# Patient Record
Sex: MALE | Race: BLACK OR AFRICAN AMERICAN | NOT HISPANIC OR LATINO | URBAN - METROPOLITAN AREA
[De-identification: names, ages, dates, MRNs, and addresses within clinical notes are randomized per-mention and may not be internally consistent; named-entity substitution may affect disease eponyms.]

---

## 2021-07-09 ENCOUNTER — EMERGENCY (EMERGENCY)
Facility: HOSPITAL | Age: 71
LOS: 1 days | Discharge: HOME | End: 2021-07-09
Attending: STUDENT IN AN ORGANIZED HEALTH CARE EDUCATION/TRAINING PROGRAM
Payer: COMMERCIAL

## 2021-07-09 VITALS
RESPIRATION RATE: 20 BRPM | SYSTOLIC BLOOD PRESSURE: 169 MMHG | HEART RATE: 77 BPM | TEMPERATURE: 98 F | OXYGEN SATURATION: 100 % | DIASTOLIC BLOOD PRESSURE: 92 MMHG

## 2021-07-09 VITALS
HEIGHT: 70 IN | TEMPERATURE: 98 F | RESPIRATION RATE: 18 BRPM | DIASTOLIC BLOOD PRESSURE: 87 MMHG | SYSTOLIC BLOOD PRESSURE: 139 MMHG | OXYGEN SATURATION: 98 % | WEIGHT: 229.28 LBS | HEART RATE: 84 BPM

## 2021-07-09 LAB
ALBUMIN SERPL ELPH-MCNC: 3.5 G/DL — SIGNIFICANT CHANGE UP (ref 3.5–5)
ALP SERPL-CCNC: 115 U/L — SIGNIFICANT CHANGE UP (ref 40–120)
ALT FLD-CCNC: 43 U/L DA — SIGNIFICANT CHANGE UP (ref 10–60)
ANION GAP SERPL CALC-SCNC: 4 MMOL/L — LOW (ref 5–17)
APPEARANCE UR: ABNORMAL
AST SERPL-CCNC: 22 U/L — SIGNIFICANT CHANGE UP (ref 10–40)
BASOPHILS # BLD AUTO: 0.04 K/UL — SIGNIFICANT CHANGE UP (ref 0–0.2)
BASOPHILS NFR BLD AUTO: 0.5 % — SIGNIFICANT CHANGE UP (ref 0–2)
BILIRUB SERPL-MCNC: 0.3 MG/DL — SIGNIFICANT CHANGE UP (ref 0.2–1.2)
BILIRUB UR-MCNC: NEGATIVE — SIGNIFICANT CHANGE UP
BUN SERPL-MCNC: 32 MG/DL — HIGH (ref 7–18)
CALCIUM SERPL-MCNC: 8 MG/DL — LOW (ref 8.4–10.5)
CHLORIDE SERPL-SCNC: 113 MMOL/L — HIGH (ref 96–108)
CO2 SERPL-SCNC: 25 MMOL/L — SIGNIFICANT CHANGE UP (ref 22–31)
COLOR SPEC: YELLOW — SIGNIFICANT CHANGE UP
CREAT SERPL-MCNC: 2.57 MG/DL — HIGH (ref 0.5–1.3)
DIFF PNL FLD: ABNORMAL
EOSINOPHIL # BLD AUTO: 0.22 K/UL — SIGNIFICANT CHANGE UP (ref 0–0.5)
EOSINOPHIL NFR BLD AUTO: 2.9 % — SIGNIFICANT CHANGE UP (ref 0–6)
GLUCOSE SERPL-MCNC: 107 MG/DL — HIGH (ref 70–99)
GLUCOSE UR QL: NEGATIVE — SIGNIFICANT CHANGE UP
HCT VFR BLD CALC: 39.8 % — SIGNIFICANT CHANGE UP (ref 39–50)
HGB BLD-MCNC: 13.1 G/DL — SIGNIFICANT CHANGE UP (ref 13–17)
IMM GRANULOCYTES NFR BLD AUTO: 0.3 % — SIGNIFICANT CHANGE UP (ref 0–1.5)
KETONES UR-MCNC: ABNORMAL
LEUKOCYTE ESTERASE UR-ACNC: ABNORMAL
LYMPHOCYTES # BLD AUTO: 1.04 K/UL — SIGNIFICANT CHANGE UP (ref 1–3.3)
LYMPHOCYTES # BLD AUTO: 13.7 % — SIGNIFICANT CHANGE UP (ref 13–44)
MCHC RBC-ENTMCNC: 30.5 PG — SIGNIFICANT CHANGE UP (ref 27–34)
MCHC RBC-ENTMCNC: 32.9 GM/DL — SIGNIFICANT CHANGE UP (ref 32–36)
MCV RBC AUTO: 92.8 FL — SIGNIFICANT CHANGE UP (ref 80–100)
MONOCYTES # BLD AUTO: 0.74 K/UL — SIGNIFICANT CHANGE UP (ref 0–0.9)
MONOCYTES NFR BLD AUTO: 9.7 % — SIGNIFICANT CHANGE UP (ref 2–14)
NEUTROPHILS # BLD AUTO: 5.54 K/UL — SIGNIFICANT CHANGE UP (ref 1.8–7.4)
NEUTROPHILS NFR BLD AUTO: 72.9 % — SIGNIFICANT CHANGE UP (ref 43–77)
NITRITE UR-MCNC: NEGATIVE — SIGNIFICANT CHANGE UP
NRBC # BLD: 0 /100 WBCS — SIGNIFICANT CHANGE UP (ref 0–0)
PH UR: 5 — SIGNIFICANT CHANGE UP (ref 5–8)
PLATELET # BLD AUTO: 172 K/UL — SIGNIFICANT CHANGE UP (ref 150–400)
POTASSIUM SERPL-MCNC: 3.9 MMOL/L — SIGNIFICANT CHANGE UP (ref 3.5–5.3)
POTASSIUM SERPL-SCNC: 3.9 MMOL/L — SIGNIFICANT CHANGE UP (ref 3.5–5.3)
PROT SERPL-MCNC: 6.8 G/DL — SIGNIFICANT CHANGE UP (ref 6–8.3)
PROT UR-MCNC: 100
RBC # BLD: 4.29 M/UL — SIGNIFICANT CHANGE UP (ref 4.2–5.8)
RBC # FLD: 13.6 % — SIGNIFICANT CHANGE UP (ref 10.3–14.5)
SARS-COV-2 RNA SPEC QL NAA+PROBE: SIGNIFICANT CHANGE UP
SODIUM SERPL-SCNC: 142 MMOL/L — SIGNIFICANT CHANGE UP (ref 135–145)
SP GR SPEC: 1.02 — SIGNIFICANT CHANGE UP (ref 1.01–1.02)
TROPONIN I SERPL-MCNC: <0.015 NG/ML — SIGNIFICANT CHANGE UP (ref 0–0.04)
UROBILINOGEN FLD QL: NEGATIVE — SIGNIFICANT CHANGE UP
WBC # BLD: 7.6 K/UL — SIGNIFICANT CHANGE UP (ref 3.8–10.5)
WBC # FLD AUTO: 7.6 K/UL — SIGNIFICANT CHANGE UP (ref 3.8–10.5)

## 2021-07-09 PROCEDURE — 70450 CT HEAD/BRAIN W/O DYE: CPT | Mod: 26,MA

## 2021-07-09 PROCEDURE — 85025 COMPLETE CBC W/AUTO DIFF WBC: CPT

## 2021-07-09 PROCEDURE — 72170 X-RAY EXAM OF PELVIS: CPT

## 2021-07-09 PROCEDURE — 71045 X-RAY EXAM CHEST 1 VIEW: CPT

## 2021-07-09 PROCEDURE — 70450 CT HEAD/BRAIN W/O DYE: CPT | Mod: MA

## 2021-07-09 PROCEDURE — 99285 EMERGENCY DEPT VISIT HI MDM: CPT

## 2021-07-09 PROCEDURE — 84484 ASSAY OF TROPONIN QUANT: CPT

## 2021-07-09 PROCEDURE — 72170 X-RAY EXAM OF PELVIS: CPT | Mod: 26

## 2021-07-09 PROCEDURE — 71045 X-RAY EXAM CHEST 1 VIEW: CPT | Mod: 26

## 2021-07-09 PROCEDURE — 80053 COMPREHEN METABOLIC PANEL: CPT

## 2021-07-09 PROCEDURE — 99284 EMERGENCY DEPT VISIT MOD MDM: CPT | Mod: 25

## 2021-07-09 PROCEDURE — 36415 COLL VENOUS BLD VENIPUNCTURE: CPT

## 2021-07-09 PROCEDURE — 93005 ELECTROCARDIOGRAM TRACING: CPT

## 2021-07-09 PROCEDURE — 81001 URINALYSIS AUTO W/SCOPE: CPT

## 2021-07-09 PROCEDURE — 87086 URINE CULTURE/COLONY COUNT: CPT

## 2021-07-09 PROCEDURE — 87635 SARS-COV-2 COVID-19 AMP PRB: CPT

## 2021-07-09 RX ORDER — MECLIZINE HCL 12.5 MG
25 TABLET ORAL ONCE
Refills: 0 | Status: COMPLETED | OUTPATIENT
Start: 2021-07-09 | End: 2021-07-09

## 2021-07-09 RX ORDER — MECLIZINE HCL 12.5 MG
2 TABLET ORAL
Qty: 56 | Refills: 0
Start: 2021-07-09 | End: 2021-07-15

## 2021-07-09 RX ADMIN — Medication 25 MILLIGRAM(S): at 16:38

## 2021-07-09 NOTE — ED ADULT NURSE NOTE - DATE OF LAST VACCINATION
Patient: Marvin Chavarria    Procedure(s):  BIOPSY, PROSTATE, RECTAL APPROACH    Diagnosis:Elevated prostate specific antigen (PSA) [R97.20]  Diagnosis Additional Information: No value filed.    Anesthesia Type:  MAC    Note:  Anesthesia Post Evaluation    Patient location during evaluation: Phase 2  Patient participation: Able to fully participate in evaluation  Level of consciousness: awake and alert  Pain management: adequate  Airway patency: patent  Cardiovascular status: acceptable  Respiratory status: acceptable  Hydration status: acceptable  PONV: none     Anesthetic complications: None          Last vitals:  Vitals:    02/04/20 0918   BP: 112/64   Resp: 16   Temp: 97.5  F (36.4  C)   SpO2: 95%         Electronically Signed By: LYNN SMITH CRNA  February 4, 2020  11:03 AM  
09-Jul-2021

## 2021-07-09 NOTE — ED PROVIDER NOTE - PATIENT PORTAL LINK FT
You can access the FollowMyHealth Patient Portal offered by [facility] by registering at the following website: [email or web address]. By joining [facility]’s FollowMyHealth portal, you will also be able to view your health information using other applications (apps) compatible with our system.

## 2021-07-09 NOTE — ED PROVIDER NOTE - OBJECTIVE STATEMENT
70-year-old male hx of HTN, HLD, hypothryoidism, vertigo, chronic kidney disease, presenting with multiple episodes of intermittent dizziness since last week. Has been taking meclizine 12.5mg every 6 hours. Yesterday he had an episode of dizziness in the bathroom, fell onto his hands, denies headstrike. Was able to get up and ambulate. Today he is here because his daughter is concerned about his symptoms. His dizziness is an unsteadiness/weakness - denies lightheadedness. Denies chest pain, shortness of breath, palpitations, or any other symptoms.    All systems were reviewed and were otherwise negative.

## 2021-07-09 NOTE — ED PROVIDER NOTE - PHYSICAL EXAMINATION
Afebrile, hemodynamically stable, saturating well  NAD, well appearing  Head NCAT  EOMI grossly, anicteric  MMM  No JVD  RRR, nml S1/S2, no m/r/g  Lungs CTAB, no w/r/r  Abd soft, NT, ND, nml BS, no rebound or guarding  AAO, CN's 3-12 grossly intact. no nystagmus. Dizziness not elicited with Braggadocio Hallpike manuever.  LAM spontaneously, no leg cyanosis or edema  Skin warm, well perfused, no rashes or hives

## 2021-07-09 NOTE — ED PROVIDER NOTE - CLINICAL SUMMARY MEDICAL DECISION MAKING FREE TEXT BOX
70-year-old male hx of HTN, HLD, hypothryoidism, vertigo, presenting with multiple episodes of intermittent dizziness since last week - likely peripherial vertigo but differential includes infection vs electrolyte distubrance ve ACS/MI. Low suspicion for central process given intermittent nature of symptoms but CTH ordered.   -labs  -XR  -CTH  -dispo pending results and clinical course

## 2021-07-09 NOTE — ED ADULT NURSE NOTE - OBJECTIVE STATEMENT
pt from home c/o of dizziness x4 days, s/p fall yesterday, denies any head injury, denies LOC, pt is alert awake oriented x3, Rt eye impaired vision, denies any pain at this time

## 2021-07-09 NOTE — ED PROVIDER NOTE - PROGRESS NOTE DETAILS
Nilsa: Labs demonstrating Cr 2.57 - spoke to pt's PMD Dr. Francis who states this is around pt's baseline. Discussed plan to check labs, xrays and CTH, and likely discharge if negative - Tito amenable to plan. Signed out to Dr. Hoff. Nilsa: Labs demonstrating Cr 2.57 - spoke to pt's PMD Dr. Francis who states this is around pt's baseline. CTH, XR normal. Patient discharged with PMD followup and return precautions and rx for meclizine.

## 2021-07-10 LAB
CULTURE RESULTS: SIGNIFICANT CHANGE UP
SPECIMEN SOURCE: SIGNIFICANT CHANGE UP

## 2022-04-04 PROBLEM — Z00.00 ENCOUNTER FOR PREVENTIVE HEALTH EXAMINATION: Status: ACTIVE | Noted: 2022-04-04

## 2022-04-11 ENCOUNTER — APPOINTMENT (OUTPATIENT)
Dept: CT IMAGING | Facility: IMAGING CENTER | Age: 72
End: 2022-04-11

## 2022-04-11 ENCOUNTER — APPOINTMENT (OUTPATIENT)
Dept: UROLOGY | Facility: CLINIC | Age: 72
End: 2022-04-11
Payer: MEDICARE

## 2022-04-11 VITALS
WEIGHT: 205 LBS | HEART RATE: 79 BPM | TEMPERATURE: 98.6 F | SYSTOLIC BLOOD PRESSURE: 113 MMHG | DIASTOLIC BLOOD PRESSURE: 66 MMHG | BODY MASS INDEX: 29.35 KG/M2 | RESPIRATION RATE: 17 BRPM | HEIGHT: 70 IN

## 2022-04-11 DIAGNOSIS — E11.9 TYPE 2 DIABETES MELLITUS W/OUT COMPLICATIONS: ICD-10-CM

## 2022-04-11 DIAGNOSIS — Z85.819 PERSONAL HISTORY OF MALIGNANT NEOPLASM OF UNSPECIFIED SITE OF LIP, ORAL CAVITY, AND PHARYNX: ICD-10-CM

## 2022-04-11 DIAGNOSIS — I10 ESSENTIAL (PRIMARY) HYPERTENSION: ICD-10-CM

## 2022-04-11 DIAGNOSIS — Z85.79 PERSONAL HISTORY OF OTHER MALIGNANT NEOPLASMS OF LYMPHOID, HEMATOPOIETIC AND RELATED TISSUES: ICD-10-CM

## 2022-04-11 DIAGNOSIS — Z86.79 PERSONAL HISTORY OF OTHER DISEASES OF THE CIRCULATORY SYSTEM: ICD-10-CM

## 2022-04-11 PROCEDURE — 99203 OFFICE O/P NEW LOW 30 MIN: CPT

## 2022-04-11 NOTE — PHYSICAL EXAM
[General Appearance - Well Developed] : well developed [General Appearance - Well Nourished] : well nourished [Normal Appearance] : normal appearance [Well Groomed] : well groomed [General Appearance - In No Acute Distress] : no acute distress [Edema] : no peripheral edema [] : no respiratory distress [Respiration, Rhythm And Depth] : normal respiratory rhythm and effort [Exaggerated Use Of Accessory Muscles For Inspiration] : no accessory muscle use [Abdomen Soft] : soft [Abdomen Tenderness] : non-tender [Costovertebral Angle Tenderness] : no ~M costovertebral angle tenderness [Urinary Bladder Findings] : the bladder was normal on palpation [Normal Station and Gait] : the gait and station were normal for the patient's age [Skin Color & Pigmentation] : normal skin color and pigmentation [Oriented To Time, Place, And Person] : oriented to person, place, and time [Affect] : the affect was normal [Mood] : the mood was normal [Not Anxious] : not anxious

## 2022-04-11 NOTE — ASSESSMENT
[FreeTextEntry1] : CT abd/pelv without contrast\par CBC, BMP ordered\par Urine culture sent\par see me in 2 weeks to discuss results\par \par Pt left without doing labs- pt's daughter informed. She will bring him back one day this week.

## 2022-04-11 NOTE — HISTORY OF PRESENT ILLNESS
[FreeTextEntry1] : 70y/o man\par Here for initial evaluation\par Bilateral retained encrusted stents--initially placed during lymphoma treatment about 15 years ago.\par \par He was recently admitted to [facility] for renal failure and retained ureteral stents bilaterally\par Right sided stent was successfully changed but left sided stent broke and a piece is still retained. Left nephrostomy tube was also removed.\par He previously had a nephrostomy tube inserted at [facility] in [facility]. He was diagnosed with renal failure, the retained stents were diagnosed and nephrostomy tube was inserted.\par \par Cr up to 4.5 on admission to [facility]\par \par He was referred here for removal of the remaining stent\par \par PMH/PSH: DM, vertigo, CKD, RUTH, Lymphoma treated with chemotherapy, HTN, UTI, throat cancer, right eye surgery, glaucoma/blindness right eye\par Medications: \par Allergies: NKDA\par

## 2022-04-16 ENCOUNTER — OUTPATIENT (OUTPATIENT)
Dept: OUTPATIENT SERVICES | Facility: HOSPITAL | Age: 72
LOS: 1 days | End: 2022-04-16
Payer: COMMERCIAL

## 2022-04-16 ENCOUNTER — APPOINTMENT (OUTPATIENT)
Dept: CT IMAGING | Facility: IMAGING CENTER | Age: 72
End: 2022-04-16
Payer: MEDICARE

## 2022-04-16 DIAGNOSIS — N13.30 UNSPECIFIED HYDRONEPHROSIS: ICD-10-CM

## 2022-04-16 PROCEDURE — 74176 CT ABD & PELVIS W/O CONTRAST: CPT

## 2022-04-16 PROCEDURE — 74176 CT ABD & PELVIS W/O CONTRAST: CPT | Mod: 26

## 2022-04-20 ENCOUNTER — INPATIENT (INPATIENT)
Facility: HOSPITAL | Age: 72
LOS: 8 days | Discharge: HOME HEALTH | End: 2022-04-29
Attending: INTERNAL MEDICINE | Admitting: INTERNAL MEDICINE
Payer: MEDICARE

## 2022-04-20 VITALS
RESPIRATION RATE: 18 BRPM | DIASTOLIC BLOOD PRESSURE: 80 MMHG | HEART RATE: 79 BPM | OXYGEN SATURATION: 100 % | HEIGHT: 70 IN | SYSTOLIC BLOOD PRESSURE: 147 MMHG | TEMPERATURE: 98 F

## 2022-04-20 DIAGNOSIS — N17.9 ACUTE KIDNEY FAILURE, UNSPECIFIED: ICD-10-CM

## 2022-04-20 DIAGNOSIS — Z29.9 ENCOUNTER FOR PROPHYLACTIC MEASURES, UNSPECIFIED: ICD-10-CM

## 2022-04-20 DIAGNOSIS — J96.01 ACUTE RESPIRATORY FAILURE WITH HYPOXIA: ICD-10-CM

## 2022-04-20 DIAGNOSIS — D64.9 ANEMIA, UNSPECIFIED: ICD-10-CM

## 2022-04-20 DIAGNOSIS — Z98.890 OTHER SPECIFIED POSTPROCEDURAL STATES: Chronic | ICD-10-CM

## 2022-04-20 DIAGNOSIS — I10 ESSENTIAL (PRIMARY) HYPERTENSION: ICD-10-CM

## 2022-04-20 DIAGNOSIS — I95.1 ORTHOSTATIC HYPOTENSION: ICD-10-CM

## 2022-04-20 LAB
ALBUMIN SERPL ELPH-MCNC: 3.9 G/DL — SIGNIFICANT CHANGE UP (ref 3.3–5)
ALBUMIN SERPL ELPH-MCNC: 4.2 G/DL — SIGNIFICANT CHANGE UP (ref 3.3–5)
ALP SERPL-CCNC: 111 U/L — SIGNIFICANT CHANGE UP (ref 40–120)
ALP SERPL-CCNC: 115 U/L — SIGNIFICANT CHANGE UP (ref 40–120)
ALT FLD-CCNC: 11 U/L — SIGNIFICANT CHANGE UP (ref 4–41)
ALT FLD-CCNC: 8 U/L — SIGNIFICANT CHANGE UP (ref 4–41)
ANION GAP SERPL CALC-SCNC: 14 MMOL/L
ANION GAP SERPL CALC-SCNC: 14 MMOL/L — SIGNIFICANT CHANGE UP (ref 7–14)
ANION GAP SERPL CALC-SCNC: 14 MMOL/L — SIGNIFICANT CHANGE UP (ref 7–14)
APPEARANCE UR: CLEAR — SIGNIFICANT CHANGE UP
APTT BLD: 35.9 SEC — SIGNIFICANT CHANGE UP (ref 27–36.3)
AST SERPL-CCNC: 11 U/L — SIGNIFICANT CHANGE UP (ref 4–40)
AST SERPL-CCNC: 14 U/L — SIGNIFICANT CHANGE UP (ref 4–40)
BACTERIA # UR AUTO: NEGATIVE — SIGNIFICANT CHANGE UP
BACTERIA UR CULT: NORMAL
BASOPHILS # BLD AUTO: 0.02 K/UL — SIGNIFICANT CHANGE UP (ref 0–0.2)
BASOPHILS # BLD AUTO: 0.03 K/UL
BASOPHILS NFR BLD AUTO: 0.4 % — SIGNIFICANT CHANGE UP (ref 0–2)
BASOPHILS NFR BLD AUTO: 0.6 %
BILIRUB SERPL-MCNC: 0.2 MG/DL — SIGNIFICANT CHANGE UP (ref 0.2–1.2)
BILIRUB SERPL-MCNC: <0.2 MG/DL — SIGNIFICANT CHANGE UP (ref 0.2–1.2)
BILIRUB UR-MCNC: NEGATIVE — SIGNIFICANT CHANGE UP
BLD GP AB SCN SERPL QL: NEGATIVE — SIGNIFICANT CHANGE UP
BUN SERPL-MCNC: 57 MG/DL
BUN SERPL-MCNC: 58 MG/DL — HIGH (ref 7–23)
BUN SERPL-MCNC: 59 MG/DL — HIGH (ref 7–23)
CALCIUM SERPL-MCNC: 8.2 MG/DL — LOW (ref 8.4–10.5)
CALCIUM SERPL-MCNC: 8.3 MG/DL — LOW (ref 8.4–10.5)
CALCIUM SERPL-MCNC: 8.7 MG/DL
CHLORIDE SERPL-SCNC: 107 MMOL/L — SIGNIFICANT CHANGE UP (ref 98–107)
CHLORIDE SERPL-SCNC: 109 MMOL/L
CHLORIDE SERPL-SCNC: 110 MMOL/L — HIGH (ref 98–107)
CO2 SERPL-SCNC: 18 MMOL/L — LOW (ref 22–31)
CO2 SERPL-SCNC: 20 MMOL/L — LOW (ref 22–31)
CO2 SERPL-SCNC: 22 MMOL/L
COLOR SPEC: SIGNIFICANT CHANGE UP
CREAT SERPL-MCNC: 6.75 MG/DL — HIGH (ref 0.5–1.3)
CREAT SERPL-MCNC: 6.96 MG/DL — HIGH (ref 0.5–1.3)
CREAT SERPL-MCNC: 7.03 MG/DL
DIFF PNL FLD: ABNORMAL
EGFR: 8 ML/MIN/1.73M2
EGFR: 8 ML/MIN/1.73M2 — LOW
EGFR: 8 ML/MIN/1.73M2 — LOW
EOSINOPHIL # BLD AUTO: 0.26 K/UL — SIGNIFICANT CHANGE UP (ref 0–0.5)
EOSINOPHIL # BLD AUTO: 0.29 K/UL
EOSINOPHIL NFR BLD AUTO: 5.4 % — SIGNIFICANT CHANGE UP (ref 0–6)
EOSINOPHIL NFR BLD AUTO: 5.9 %
EPI CELLS # UR: 1 /HPF — SIGNIFICANT CHANGE UP (ref 0–5)
FERRITIN SERPL-MCNC: 293 NG/ML — SIGNIFICANT CHANGE UP (ref 30–400)
GLUCOSE SERPL-MCNC: 100 MG/DL — HIGH (ref 70–99)
GLUCOSE SERPL-MCNC: 102 MG/DL
GLUCOSE SERPL-MCNC: 96 MG/DL — SIGNIFICANT CHANGE UP (ref 70–99)
GLUCOSE UR QL: NEGATIVE — SIGNIFICANT CHANGE UP
HCT VFR BLD CALC: 28.7 % — LOW (ref 39–50)
HCT VFR BLD CALC: 31.9 %
HGB BLD-MCNC: 9.2 G/DL — LOW (ref 13–17)
HGB BLD-MCNC: 9.7 G/DL
IANC: 3.11 K/UL — SIGNIFICANT CHANGE UP (ref 1.8–7.4)
IMM GRANULOCYTES NFR BLD AUTO: 0.2 %
IMM GRANULOCYTES NFR BLD AUTO: 0.2 % — SIGNIFICANT CHANGE UP (ref 0–1.5)
INR BLD: 1 RATIO — SIGNIFICANT CHANGE UP (ref 0.88–1.16)
IRON SATN MFR SERPL: 27 % — SIGNIFICANT CHANGE UP (ref 14–50)
IRON SATN MFR SERPL: 62 UG/DL — SIGNIFICANT CHANGE UP (ref 45–165)
KETONES UR-MCNC: NEGATIVE — SIGNIFICANT CHANGE UP
LEUKOCYTE ESTERASE UR-ACNC: ABNORMAL
LYMPHOCYTES # BLD AUTO: 1 K/UL
LYMPHOCYTES # BLD AUTO: 1.03 K/UL — SIGNIFICANT CHANGE UP (ref 1–3.3)
LYMPHOCYTES # BLD AUTO: 21.5 % — SIGNIFICANT CHANGE UP (ref 13–44)
LYMPHOCYTES NFR BLD AUTO: 20.4 %
MAGNESIUM SERPL-MCNC: 1.8 MG/DL — SIGNIFICANT CHANGE UP (ref 1.6–2.6)
MAN DIFF?: NORMAL
MCHC RBC-ENTMCNC: 29.2 PG
MCHC RBC-ENTMCNC: 30.2 PG — SIGNIFICANT CHANGE UP (ref 27–34)
MCHC RBC-ENTMCNC: 30.4 GM/DL
MCHC RBC-ENTMCNC: 32.1 GM/DL — SIGNIFICANT CHANGE UP (ref 32–36)
MCV RBC AUTO: 94.1 FL — SIGNIFICANT CHANGE UP (ref 80–100)
MCV RBC AUTO: 96.1 FL
MONOCYTES # BLD AUTO: 0.33 K/UL
MONOCYTES # BLD AUTO: 0.35 K/UL — SIGNIFICANT CHANGE UP (ref 0–0.9)
MONOCYTES NFR BLD AUTO: 6.7 %
MONOCYTES NFR BLD AUTO: 7.3 % — SIGNIFICANT CHANGE UP (ref 2–14)
NEUTROPHILS # BLD AUTO: 3.11 K/UL — SIGNIFICANT CHANGE UP (ref 1.8–7.4)
NEUTROPHILS # BLD AUTO: 3.25 K/UL
NEUTROPHILS NFR BLD AUTO: 65.2 % — SIGNIFICANT CHANGE UP (ref 43–77)
NEUTROPHILS NFR BLD AUTO: 66.2 %
NITRITE UR-MCNC: NEGATIVE — SIGNIFICANT CHANGE UP
NRBC # BLD: 0 /100 WBCS — SIGNIFICANT CHANGE UP
NRBC # FLD: 0 K/UL — SIGNIFICANT CHANGE UP
PH UR: 6.5 — SIGNIFICANT CHANGE UP (ref 5–8)
PHOSPHATE SERPL-MCNC: 4.3 MG/DL — SIGNIFICANT CHANGE UP (ref 2.5–4.5)
PLATELET # BLD AUTO: 194 K/UL — SIGNIFICANT CHANGE UP (ref 150–400)
PLATELET # BLD AUTO: 226 K/UL
POTASSIUM SERPL-MCNC: 4.9 MMOL/L — SIGNIFICANT CHANGE UP (ref 3.5–5.3)
POTASSIUM SERPL-MCNC: 5 MMOL/L — SIGNIFICANT CHANGE UP (ref 3.5–5.3)
POTASSIUM SERPL-SCNC: 4.9 MMOL/L — SIGNIFICANT CHANGE UP (ref 3.5–5.3)
POTASSIUM SERPL-SCNC: 5 MMOL/L — SIGNIFICANT CHANGE UP (ref 3.5–5.3)
POTASSIUM SERPL-SCNC: 5.6 MMOL/L
PROT SERPL-MCNC: 6.5 G/DL — SIGNIFICANT CHANGE UP (ref 6–8.3)
PROT SERPL-MCNC: 6.5 G/DL — SIGNIFICANT CHANGE UP (ref 6–8.3)
PROT UR-MCNC: ABNORMAL
PROTHROM AB SERPL-ACNC: 11.6 SEC — SIGNIFICANT CHANGE UP (ref 10.5–13.4)
RBC # BLD: 3.05 M/UL — LOW (ref 4.2–5.8)
RBC # BLD: 3.32 M/UL
RBC # FLD: 14.9 %
RBC # FLD: 14.9 % — HIGH (ref 10.3–14.5)
RH IG SCN BLD-IMP: POSITIVE — SIGNIFICANT CHANGE UP
SARS-COV-2 RNA SPEC QL NAA+PROBE: SIGNIFICANT CHANGE UP
SODIUM SERPL-SCNC: 141 MMOL/L — SIGNIFICANT CHANGE UP (ref 135–145)
SODIUM SERPL-SCNC: 142 MMOL/L — SIGNIFICANT CHANGE UP (ref 135–145)
SODIUM SERPL-SCNC: 145 MMOL/L
SP GR SPEC: 1.02 — SIGNIFICANT CHANGE UP (ref 1–1.05)
TIBC SERPL-MCNC: 226 UG/DL — SIGNIFICANT CHANGE UP (ref 220–430)
UIBC SERPL-MCNC: 164 UG/DL — SIGNIFICANT CHANGE UP (ref 110–370)
UROBILINOGEN FLD QL: SIGNIFICANT CHANGE UP
WBC # BLD: 4.78 K/UL — SIGNIFICANT CHANGE UP (ref 3.8–10.5)
WBC # FLD AUTO: 4.78 K/UL — SIGNIFICANT CHANGE UP (ref 3.8–10.5)
WBC # FLD AUTO: 4.91 K/UL
WBC UR QL: 1 /HPF — SIGNIFICANT CHANGE UP (ref 0–5)

## 2022-04-20 PROCEDURE — 99223 1ST HOSP IP/OBS HIGH 75: CPT | Mod: GC

## 2022-04-20 PROCEDURE — 99285 EMERGENCY DEPT VISIT HI MDM: CPT | Mod: 25

## 2022-04-20 PROCEDURE — 71045 X-RAY EXAM CHEST 1 VIEW: CPT | Mod: 26

## 2022-04-20 PROCEDURE — 93010 ELECTROCARDIOGRAM REPORT: CPT

## 2022-04-20 PROCEDURE — 50432 PLMT NEPHROSTOMY CATHETER: CPT | Mod: LT

## 2022-04-20 RX ORDER — NIFEDIPINE 30 MG
60 TABLET, EXTENDED RELEASE 24 HR ORAL DAILY
Refills: 0 | Status: DISCONTINUED | OUTPATIENT
Start: 2022-04-20 | End: 2022-04-20

## 2022-04-20 RX ORDER — LISINOPRIL 2.5 MG/1
1 TABLET ORAL
Qty: 0 | Refills: 0 | DISCHARGE

## 2022-04-20 RX ORDER — ASPIRIN/CALCIUM CARB/MAGNESIUM 324 MG
1 TABLET ORAL
Qty: 0 | Refills: 0 | DISCHARGE

## 2022-04-20 RX ORDER — ATORVASTATIN CALCIUM 80 MG/1
1 TABLET, FILM COATED ORAL
Qty: 0 | Refills: 0 | DISCHARGE

## 2022-04-20 RX ORDER — ATORVASTATIN CALCIUM 80 MG/1
40 TABLET, FILM COATED ORAL AT BEDTIME
Refills: 0 | Status: DISCONTINUED | OUTPATIENT
Start: 2022-04-20 | End: 2022-04-20

## 2022-04-20 RX ORDER — TAMSULOSIN HYDROCHLORIDE 0.4 MG/1
0.4 CAPSULE ORAL AT BEDTIME
Refills: 0 | Status: DISCONTINUED | OUTPATIENT
Start: 2022-04-20 | End: 2022-04-29

## 2022-04-20 RX ORDER — AMLODIPINE BESYLATE 2.5 MG/1
1 TABLET ORAL
Qty: 0 | Refills: 0 | DISCHARGE

## 2022-04-20 RX ORDER — LABETALOL HCL 100 MG
300 TABLET ORAL
Refills: 0 | Status: DISCONTINUED | OUTPATIENT
Start: 2022-04-20 | End: 2022-04-20

## 2022-04-20 RX ORDER — LEVOTHYROXINE SODIUM 125 MCG
25 TABLET ORAL DAILY
Refills: 0 | Status: DISCONTINUED | OUTPATIENT
Start: 2022-04-20 | End: 2022-04-29

## 2022-04-20 RX ORDER — HEPARIN SODIUM 5000 [USP'U]/ML
5000 INJECTION INTRAVENOUS; SUBCUTANEOUS EVERY 8 HOURS
Refills: 0 | Status: DISCONTINUED | OUTPATIENT
Start: 2022-04-20 | End: 2022-04-29

## 2022-04-20 RX ORDER — ATORVASTATIN CALCIUM 80 MG/1
20 TABLET, FILM COATED ORAL AT BEDTIME
Refills: 0 | Status: DISCONTINUED | OUTPATIENT
Start: 2022-04-20 | End: 2022-04-29

## 2022-04-20 RX ADMIN — TAMSULOSIN HYDROCHLORIDE 0.4 MILLIGRAM(S): 0.4 CAPSULE ORAL at 22:28

## 2022-04-20 RX ADMIN — ATORVASTATIN CALCIUM 20 MILLIGRAM(S): 80 TABLET, FILM COATED ORAL at 22:28

## 2022-04-20 RX ADMIN — HEPARIN SODIUM 5000 UNIT(S): 5000 INJECTION INTRAVENOUS; SUBCUTANEOUS at 22:28

## 2022-04-20 NOTE — CONSULT NOTE ADULT - ASSESSMENT
70yo M with hx of HTN, HLD, CKD, lymphoma, vertigo, hypothyroidism, B/L ureteral stents (placed 15 years ago), presenting to ED from urologist office for nephrostomy tube placement and management of elevated Cr to 6.9.    Plan  - Emergent L nephrostomy tube placement for hydronephrosis  - Nephrology consult for management of RUTH on CKD and management of hyperkalemia  - Operative intervention for retained L stent fragment will be planned outpatient with Dr. Desir, cardiac clearance for further surgical intervention can be done outpatient.  -Plan discussed with Dr. Desir.

## 2022-04-20 NOTE — MEDICAL STUDENT ADULT H&P (EDUCATION) - NS MD HP STUD ASPLAN ASSES FT
72 yo male with pmh of HTN, HLD, T2DM, CKD and lymphoma treated with chemotherapy and stent placements presenting with hypoxia s/p insertion of L nephrostomy tube requiring intubation. Currently extubated with stable vs and unremarkable PE.

## 2022-04-20 NOTE — H&P ADULT - NSHPSOCIALHISTORY_GEN_ALL_CORE
Social History:    Marital Status:  (   )    (   ) Single    (   )    (  )   Occupation:   Lives with: (  ) alone  (  ) children   (  ) spouse   (  ) parents  (  ) other    Substance Use (street drugs): (  ) never used  (  ) other:  Tobacco Usage:  (   ) never smoked   (   ) former smoker   (   ) current smoker  (     ) pack year  (        ) last cigarette date  Alcohol Usage:  Sexual History: Social History:    Lives with: (  ) alone  ( X ) children   (  ) spouse   (  ) parents  (  ) other    Substance Use (street drugs): ( X ) never used  (  ) other:  Tobacco Usage:  ( X  ) never smoked   (   ) former smoker   (   ) current smoker  (     ) pack year  (        ) last cigarette date  Alcohol Usage: none Social History:  Denies any tobacco, alcohol, or illicit drug use  Lives with his children

## 2022-04-20 NOTE — ED PROVIDER NOTE - ATTENDING APP SHARED VISIT CONTRIBUTION OF CARE
Susana: I have seen and examined the patient face to face, have reviewed and addended the HPI, PE and a/p as necessary.     70 yo M with HTN, HLD, CKD, lymphoma, vertigo and bilateral ureteral stents a/w L sided hydro and malpositioned L stent here for nephrostomy tube placement.  Pt follows with Dr. Desir of urology.  Pt denies fevers, chills, chest pain, shortness of breath, nausea, vomiting abdominal pain.  On ROS, patient reports 1 year of chronic dizziness when standing, described as lightheadedness, improves prior to ambulating.      GEN - NAD; well appearing; A+O x3; non-toxic appearing  CARD -s1s2, RRR, no M,G,R;   PULM - CTA b/l, symmetric breath sounds;   ABD -  +BS, ND, NT, soft, no guarding, no rebound, no masses;   BACK - no CVA tenderness, Normal  spine;   EXT - symmetric pulses, 2+ dp, capillary refill < 2 seconds, no cyanosis, no edema;   NEURO - no focal neuro deficits, no slurred speech    70 yo M with HTN, HLD, CKD, lymphoma, vertigo and bilateral ureteral stents a/w L sided hydro and malpositioned L stent here for nephrostomy tube placement.  Pre op labs, IR consult, discuss with urology.  Orthostatics, re-eval.  Admit for nephrostomy placement.

## 2022-04-20 NOTE — H&P ADULT - PROBLEM SELECTOR PLAN 5
DVT ppx: heparin subq  Diet: regular   Dispo: Likely home, PT?  Code: Full Pt on nifedipine and labetalol at home. BPs in acceptable range.   - Will hold home BP meds (nifedipine and labetalol) for now given positive orthostatics on admission and recent sedation. Will resume as BP tolerates.

## 2022-04-20 NOTE — H&P ADULT - PROBLEM SELECTOR PLAN 3
Hgb 9.2, unclear baseline. However likely in setting of CKD. Patient without evidence of bleeding.   - Will check iron studies   - Monitor Hgb for now Hgb 9.2, unclear baseline. However, likely in setting of CKD and anemia of chronic disease. Patient without evidence of bleeding.   - Will check iron studies   - Monitor Hgb for now

## 2022-04-20 NOTE — H&P ADULT - NSHPPHYSICALEXAM_GEN_ALL_CORE
PHYSICAL EXAM:  GENERAL: NAD, well-groomed, well-developed  HEAD:  Atraumatic, Normocephalic  EYES: EOMI, PERRLA, conjunctiva and sclera clear  ENMT: No tonsillar erythema, exudates, or enlargement; Moist mucous membranes, Good dentition, No lesions  NECK: Supple, No JVD, Normal thyroid  CHEST/LUNG: Clear to percussion bilaterally; No rales, rhonchi, wheezing, or rubs  HEART: Regular rate and rhythm; No murmurs, rubs, or gallops  ABDOMEN: Soft, Nontender, Nondistended; Bowel sounds present  VASCULAR:  2+ Peripheral Pulses, No clubbing, cyanosis, or edema  LYMPH: No lymphadenopathy noted  SKIN: No rashes or lesions  NERVOUS SYSTEM:  Alert & Oriented X3, Good concentration; Motor Strength 5/5 B/L upper and lower extremities; DTRs 2+ intact and symmetric PHYSICAL EXAM:  GENERAL: NAD, well-groomed, well-developed  HEAD:  Atraumatic, Normocephalic  ENMT: No tonsillar erythema, exudates, or enlargement; Moist mucous membranes  NECK: Supple, No JVD  CHEST/LUNG: Clear to auscultation bilaterally; No rales, rhonchi, wheezing, or rubs  HEART: Regular rate and rhythm; No murmurs, rubs, or gallops  ABDOMEN: firm, Nontender, Nondistended; Bowel sounds present  VASCULAR:  2+ Peripheral Pulses, No clubbing, cyanosis, or edema  NERVOUS SYSTEM:  Alert & Oriented X3; Motor Strength 5/5 B/L upper and lower extremities Vital Signs Last 24 Hrs  T(C): 36.7 (21 Apr 2022 05:05), Max: 36.9 (20 Apr 2022 14:40)  T(F): 98.1 (21 Apr 2022 05:05), Max: 98.4 (20 Apr 2022 14:40)  HR: 75 (21 Apr 2022 05:05) (73 - 80)  BP: 129/68 (21 Apr 2022 05:05) (129/68 - 156/77)  BP(mean): --  RR: 18 (21 Apr 2022 05:05) (16 - 18)  SpO2: 100% (21 Apr 2022 05:05) (100% - 100%)    PHYSICAL EXAM:  General: Awake and alert.  No acute distress.  Head: Normocephalic, atraumatic.    Eyes: R eye blindness.  L eye with EOMI.  No scleral icterus.  No conjunctival pallor.  Mouth: Moist MM.  No oropharyngeal exudates.    Neck: Supple.  Full range of motion.  No JVD.  No LAD.  Trachea midline.    Heart: RRR.  Normal S1 and S2.  No murmurs, rubs, or gallops.  No LE edema b/l.   Lungs: Nonlabored breathing.  Good inspiratory effort.  CTAB.  No wheezes, crackles, or rhonchi.    Abdomen: BS+, soft, nontender with no rebound or guarding, nondistended.  No hepatomegaly.   Back: L flank with nephrostomy tube in place and draining urine (small amount of blood, clearing up), no surrounding TTP or ecchymosis/hematoma, no active bleed.  No spinal or paraspinal tenderness.  Skin: Warm and dry.  No rashes.  Extremities: No cyanosis.  2+ peripheral pulses b/l.  Musculoskeletal: No joint deformities.    Neuro: A&Ox3.  CN II-XII intact except for R eye blindness.  5/5 motor strength in UE and LE b/l.  Tactile sensation intact in UE and LE b/l.  No focal deficits.

## 2022-04-20 NOTE — ED ADULT NURSE NOTE - NS ED NURSE LEVEL OF CONSCIOUSNESS SPEECH
Addended by: DIANA MILTON on: 1/7/2020 04:02 PM     Modules accepted: Orders     Speaking Coherently

## 2022-04-20 NOTE — MEDICAL STUDENT ADULT H&P (EDUCATION) - NS MD HP STUD PE VITALS FT
Vital Signs Last 24 Hrs  T(C): 36.9 (04-20-22 @ 14:40), Max: 36.9 (04-20-22 @ 14:40)  T(F): 98.4 (04-20-22 @ 14:40), Max: 98.4 (04-20-22 @ 14:40)  HR: 73 (04-20-22 @ 12:49) (73 - 79)  BP: 133/75 (04-20-22 @ 12:49) (133/75 - 156/77)  BP(mean): --  RR: 18 (04-20-22 @ 14:40) (16 - 18)  SpO2: 100% (04-20-22 @ 14:40) (100% - 100%)    Orthostatic VS  04-20-22 @ 14:40  Lying BP: 137/87 HR: 69  Sitting BP: 151/85 HR: 68  Standing BP: 98/57 HR: 69  Site: upper left arm  Mode: electronic

## 2022-04-20 NOTE — ED ADULT TRIAGE NOTE - CHIEF COMPLAINT QUOTE
Pt states went and had blood work yesterday called by his doctor this morning told to go to hospital for elevated renal function. pt states feels fine has no complaints , pt type 2 fis 104 in triage. Pt blind in the right eye.

## 2022-04-20 NOTE — PATIENT PROFILE ADULT - NSPROGENPREVTRANSF_GEN_A_NUR
The ABCs of the Annual Wellness Visit  Initial Medicare Wellness Visit    Chief Complaint   Patient presents with   • Medicare Wellness-Initial Visit       Subjective   History of Present Illness:  Chrissy Dawson is a 70 y.o. female who presents for an Initial Medicare Wellness Visit.    HEALTH RISK ASSESSMENT    Recent Hospitalizations:  No hospitalization(s) within the last year.    Current Medical Providers:  Patient Care Team:  Fanny Herrera MD as PCP - General (Family Medicine)    Smoking Status:  Social History     Tobacco Use   Smoking Status Never Smoker   Smokeless Tobacco Never Used       Alcohol Consumption:  Social History     Substance and Sexual Activity   Alcohol Use No       Depression Screen:   PHQ-2/PHQ-9 Depression Screening 4/15/2021   Little interest or pleasure in doing things 0   Feeling down, depressed, or hopeless 0   Trouble falling or staying asleep, or sleeping too much 0   Feeling tired or having little energy 0   Poor appetite or overeating 0   Feeling bad about yourself - or that you are a failure or have let yourself or your family down 0   Trouble concentrating on things, such as reading the newspaper or watching television 0   Moving or speaking so slowly that other people could have noticed. Or the opposite - being so fidgety or restless that you have been moving around a lot more than usual 0   Thoughts that you would be better off dead, or of hurting yourself in some way 0   Total Score 0   If you checked off any problems, how difficult have these problems made it for you to do your work, take care of things at home, or get along with other people? Not difficult at all       Fall Risk Screen:  STEADI Fall Risk Assessment was completed, and patient is at HIGH risk for falls. Assessment completed on:4/15/2021    Health Habits and Functional and Cognitive Screening:  Functional & Cognitive Status 4/15/2021   Do you have difficulty preparing food and eating? No   Do you  have difficulty bathing yourself, getting dressed or grooming yourself? No   Do you have difficulty using the toilet? No   Do you have difficulty moving around from place to place? No   Do you have trouble with steps or getting out of a bed or a chair? No   Current Diet Well Balanced Diet   Dental Exam Not up to date   Eye Exam Up to date   Exercise (times per week) 3 times per week   Current Exercises Include Walking   Do you need help using the phone?  No   Are you deaf or do you have serious difficulty hearing?  No   Do you need help with transportation? No   Do you need help shopping? No   Do you need help preparing meals?  No   Do you need help with housework?  No   Do you need help with laundry? No   Do you need help taking your medications? No   Do you need help managing money? No   Do you ever drive or ride in a car without wearing a seat belt? No   Have you felt unusual stress, anger or loneliness in the last month? No   Who do you live with? Spouse   If you need help, do you have trouble finding someone available to you? No   Have you been bothered in the last four weeks by sexual problems? No   Do you have difficulty concentrating, remembering or making decisions? No         Does the patient have evidence of cognitive impairment? No    Asprin use counseling:Taking ASA appropriately as indicated    Age-appropriate Screening Schedule:  Refer to the list below for future screening recommendations based on patient's age, sex and/or medical conditions. Orders for these recommended tests are listed in the plan section. The patient has been provided with a written plan.    Health Maintenance   Topic Date Due   • TDAP/TD VACCINES (1 - Tdap) Never done   • ZOSTER VACCINE (1 of 2) Never done   • DXA SCAN  08/10/2020   • INFLUENZA VACCINE  08/01/2021   • LIPID PANEL  10/15/2021   • MAMMOGRAM  02/12/2022   • COLONOSCOPY  12/09/2026          The following portions of the patient's history were reviewed and updated as  appropriate: allergies, current medications, past family history, past medical history, past social history, past surgical history and problem list.    Outpatient Medications Prior to Visit   Medication Sig Dispense Refill   • aspirin 81 MG tablet Take 81 mg by mouth.     • HYDROcodone-acetaminophen (NORCO) 5-325 MG per tablet Take 1 tablet by mouth Every 6 (Six) Hours As Needed for Severe Pain . 12 tablet 0   • Multiple Minerals-Vitamins (CITRACAL PLUS PO) Take  by mouth.     • Multiple Vitamins-Minerals (CENTRUM SILVER PO) Take 1 tablet by mouth Daily.     • nitroglycerin (NITROSTAT) 0.4 MG SL tablet 1 under the tongue as needed for angina, may repeat q5mins for up three doses 15 tablet 6   • pravastatin (PRAVACHOL) 40 MG tablet Take 1 tablet by mouth Daily. 31 tablet 6   • vitamin B-12 (CYANOCOBALAMIN) 1000 MCG tablet Take 1,000 mcg by mouth Daily.     • vitamin C (ASCORBIC ACID) 500 MG tablet Take 500 mg by mouth Daily.       No facility-administered medications prior to visit.       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 Patient does not have an advance directive, information provided.    Compared to one year ago, the patient feels her physical health is a little worse.  Compared to one year ago, the patient feels her mental health is the same.    Reviewed chart for potential of high risk medication in the elderly: yes  Reviewed chart for potential of harmful drug interactions in the elderly:yes    Objective         Vitals:    04/15/21 0855   BP: 109/70   Pulse: 79   SpO2: 94%   Weight:  "80.7 kg (178 lb)   Height: 176.5 cm (69.5\")   PainSc: 0-No pain       Body mass index is 25.91 kg/m².  Discussed the patient's BMI with her. The BMI is in the acceptable range.        Assessment/Plan      1. Medicare annual wellness visit, initial (Primary)    2. Encounter for screening mammogram for malignant neoplasm of breast  -     Mammo Screening Digital Tomosynthesis Bilateral With CAD; Future    3. Postmenopausal  -     DEXA Bone Density Axial; Future    Medicare Risks and Personalized Health Plan  CMS Preventative Services Quick Reference  Advance Directive Discussion  Breast Cancer/Mammogram Screening  Cardiovascular risk  Colon Cancer Screening  Depression/Dysphoria  Immunizations Discussed/Encouraged (specific immunizations; Shingrix )    The above risks/problems have been discussed with the patient.  Pertinent information has been shared with the patient in the After Visit Summary.      Follow Up:  Return in about 3 months (around 7/15/2021) for Recheck.     An After Visit Summary and PPPS were given to the patient.           This document has been electronically signed by Fanny Herrera MD         " no

## 2022-04-20 NOTE — ED PROVIDER NOTE - PROGRESS NOTE DETAILS
ISELA Tran: Paged Urology, awaiting callback. ISELA Tran: Discussed case with Urology, Ct scan from 4/16 reviewed, will consult. ISELA Tran; Paged IR, awaiting callback.

## 2022-04-20 NOTE — H&P ADULT - PROBLEM SELECTOR PLAN 2
Likely due to post-renal/obstructive process in setting of retained ureteral stent. Scr 4.5 at [facility] (unclear how long ago). Scr on admission 6.96 on presentation  - Now s/p L nephrostomy tube by IR  - Monitor SCr and nephrostomy output  - Strict I/Os Likely due to post-renal/obstructive process in setting of retained ureteral stent. SCr 4.5 at [facility] (unclear how long ago). SCr on admission 6.96 on presentation.  - Now s/p L nephrostomy tube by IR  - Monitor SCr and nephrostomy output  - Strict I/Os  - Avoid nephrotoxic agents  - Renally dose meds

## 2022-04-20 NOTE — H&P ADULT - PROBLEM SELECTOR PLAN 10
DVT ppx: Heparin SQ, as urine with minimal blood and no active bleeding, hemostasis appears to have been achieved post-procedure.   Diet: Regular   Dispo: Likely home, PT?  Code: Full

## 2022-04-20 NOTE — ED ADULT NURSE NOTE - OBJECTIVE STATEMENT
pt is A&Ox3, ambulatory, able to make needs known and presents as per PMD for further eval of abnormal labs and possible nephrostomy tube placement. PT reports being in normal state of health with no C/O pain and is well appearing. IV to right A/C with labs sent.

## 2022-04-20 NOTE — H&P ADULT - HISTORY OF PRESENT ILLNESS
71 M hx of HTN, HLD, CKD, lymphoma, vertigo, hypothyroidism, B/L ureteral stents (placed 15 years ago), presenting to ED from urologist office for nephrostomy tube placement. Pt was recently admitted to [facility] with renal failure in setting of retained ureteral stents B/L. R ureteral stent was successfully exchanged however L ureteral stent broke and a piece is retained. Pt previously had a L nephrostomy tube as well that was inserted at [facility] in [facility] but was removed at the time of ureteral stent exchange at [facility]. Scr was noted to be 4.5 at [facility]. Pt was referred to Dr. Desir for removal of retained encrusted stent. Patient was noted to have worsening L hydronephrosis was sent to ED for urgent nephrostomy tube placement. IR was consulted and procedure done with light sedation (fentanyl and propofol) without issues however after procedure, patient became hypoxic to 50-60s and had to be subsequently intubated. Patient was monitored in the IR suite and was successfully extubated shortly afterwards.  71 M hx of HTN, HLD, CKD, lymphoma, vertigo, hypothyroidism, B/L ureteral stents (placed 15 years ago), presenting to ED from urologist office for nephrostomy tube placement. Pt was at [facility] in [facility] where he was noted to have renal failure in the ing of retained ureteral stents B/L. R ureteral stent was successfully exchanged however L ureteral stent broke and a piece was retained. A left nephrostomy was placed at that time. However, the L nephrostomy tube was not draining properly so patient presented to [facility] recently where the tube was removed. Scr was noted to be 4.5 at [facility]. Pt was referred to Dr. Desir for removal of retained encrusted stent. Patient was noted to have worsening L hydronephrosis was sent to ED for urgent nephrostomy tube placement. IR was consulted and procedure done with light sedation (fentanyl and propofol) without issues however after procedure, patient became hypoxic to 50-60s and had to be subsequently intubated. Patient was monitored in the IR suite and was successfully extubated shortly afterwards.     Patient seen in IR recovery suite and was saturating 100% on room air. Patient denies any chest pain, SOB, abdominal pain, headaches, constipation, diarrhea, or urinary changes. No recent fevers, cough, runny nose. Does endorse some throat pain and pain at procedure site. Patient is ambulatory at baseline and sometimes uses walker. Pt lives with daughter and son.  70 yo M hx of HTN, HLD, CKD, BPH, R eye blindness, lymphoma, vertigo, hypothyroidism, b/l ureteral stents (placed 15 years ago), presenting to ED from urologist office for nephrostomy tube placement. Pt was at [facility] in [facility] where he was noted to have renal failure in the setting of retained b/l ureteral stents. R ureteral stent was successfully exchanged, however L ureteral stent broke and a piece was retained. A left nephrostomy tube was placed at that time. However, the left nephrostomy tube was not draining properly, so patient presented to [facility] recently where the tube was removed. SCr was noted to be 4.5 at [facility]. Pt was referred to Dr. Desir for removal of retained encrusted stent. Patient was noted to have worsening L hydronephrosis at that visit and was sent to ED for urgent nephrostomy tube placement. IR was consulted and procedure done with light sedation (fentanyl and propofol) without issues. However after the procedure, patient became hypoxic to 50-60s and had to be subsequently intubated. Patient was monitored in the IR suite and was successfully extubated shortly afterwards.     Patient seen in IR recovery suite and was saturating 100% on room air. Patient denies any chest pain, SOB, abdominal pain, headaches, constipation, diarrhea, or urinary changes. No recent fevers, cough, runny nose. Does endorse some throat pain and pain at procedure site. Patient is ambulatory at baseline and sometimes uses walker. Pt lives with daughter and son.

## 2022-04-20 NOTE — H&P ADULT - NSICDXPASTMEDICALHX_GEN_ALL_CORE_FT
PAST MEDICAL HISTORY:  Hyperlipemia     Hypertension     Lymphoma     Vertigo      PAST MEDICAL HISTORY:  Chronic kidney disease, unspecified CKD stage     Hyperlipemia     Hypertension     Hypothyroidism     Lymphoma     Vertigo      PAST MEDICAL HISTORY:  BPH (benign prostatic hyperplasia)     Chronic kidney disease, unspecified CKD stage     Hyperlipidemia     Hypertension     Hypothyroidism     Lymphoma     Vertigo

## 2022-04-20 NOTE — MEDICAL STUDENT ADULT H&P (EDUCATION) - NS MD HP STUD RESULTS LAB FT
9.2    4.78  )-----------( 194      ( 20 Apr 2022 13:20 )             28.7   04-20    141  |  107  |  59<H>  ----------------------------<  96  5.0   |  20<L>  |  6.96<H>    Ca    8.3<L>      20 Apr 2022 13:20    TPro  6.5  /  Alb  4.2  /  TBili  0.2  /  DBili  x   /  AST  11  /  ALT  11  /  AlkPhos  111  04-20

## 2022-04-20 NOTE — CONSULT NOTE ADULT - ASSESSMENT
Interventional Radiology  Evaluate for Procedure: Left nephrostomy tube placement    HPI: 71y Male with DM, CKD, and lymphoma and bilateral retained ureteral stents (initially placed during lymphoma treatment about 15 years ago).  Right sided stent was successfully changed, but left sided stent broke and there is a retained fragment. At one point, patient also had a Left nephrostomy tube that has since been removed.  Patient had presented to urology outpatient for removal of the remaining stents.      Imaging showed patient to have left sided hydronephrosis.  Patient sent to the ED for left nephrostomy tube placement.  IR consulted for placement of the tube.     Allergies:   Medications (Abx/Cardiac/Anticoagulation/Blood Products)      Data:  177.8  T(C): 36.8  HR: 73  BP: 133/75  RR: 18  SpO2: 100%    -WBC 4.78 / HgB 9.2 / Hct 28.7 / Plt 194  -Na 141 / Cl 107 / BUN 59 / Glucose 96  -K 5.0 / CO2 20 / Cr 6.96  -ALT 11 / Alk Phos 111 / T.Bili 0.2  -INR 1.00 / PTT 35.9      Radiology: CT AP 4/16/2022 reviewed    Assessment/Plan:     71y Male with DM, CKD, and lymphoma and bilateral retained ureteral stents (initially placed during lymphoma treatment about 15 years ago).  Right sided stent was successfully changed, but left sided stent broke and there is a retained fragment. At one point, patient also had a Left nephrostomy tube that has since been removed.  Patient had presented to urology outpatient for removal of the remaining stents.      Imaging showed patient to have left sided hydronephrosis.  Patient sent to the ED for left nephrostomy tube placement.  IR consulted for placement of the tube.     -- IR will plan to perform placement of left nephrostomy tube today, 4/20/2022  -- keep NPO; patient last ate at 8 AM   -- please obtain COVID PCR   -- please complete IR pre-procedure note  -- please place IR procedure request order under Dr. Gonzalez

## 2022-04-20 NOTE — MEDICAL STUDENT ADULT H&P (EDUCATION) - NS MD HP STUD HX OF PRESENT ILLNESS FT
HPI:  70 yo male with past medical history of HTN, HLD, CKD and lymphoma treated with chemotherapy with 2 urethral stents placed 15 years ago presenting with hypoxia after placement of left nephrostomy tube today. His Right sided stent was successfully changed, but left sided stent broke and there was a retained fragment. At one point, patient also had a Left nephrostomy tube that has since been removed.  Patient had presented to urology outpatient for removal of the remaining stents. At urology, imaging showed left sided hydronephrosis and was sent to the ED to have nephrostomy tube placement in the IR. The patient had the placement of the L nephrostomy tube under light sedation with fentanyl and propofol and the case was uneventful. At the conclusion, the patients was noted to desaturated to as low as 50s/60s and was quickly moved into supine position and intubated. His O2 sats immediately improve. He was monitored in the IR suite and successfully extubated. Per anesthesia, it was believed that the patient had a delayed response to the anesthetics. The patient denies any SOB, cough, CP or abdominal pain.  HPI:  70 yo male with past medical history of HTN, HLD, CKD and lymphoma treated with chemotherapy with 2 urethral stents placed 15 years ago presenting with hypoxia after placement of left nephrostomy tube today. His Right sided stent was successfully changed, but left sided stent broke and there was a retained fragment. At one point, patient also had a Left nephrostomy tube that has since been removed. When visiting [place] about a week ago, he had an episode of hypoglycemia and collapsed. He was found to have hydronephrosis on admission and had a left nephrostomy tube place but it wasn't draining properly. So he went to [facility] and referred to Dr. Desir.  Patient had presented to urology outpatient for removal of the remaining stents. At urology, imaging showed left sided hydronephrosis and was sent to the ED to have nephrostomy tube placement in the IR. The patient had the placement of the L nephrostomy tube under light sedation with fentanyl and propofol and the case was uneventful. At the conclusion, the patients was noted to desaturated to as low as 50s/60s and was quickly moved into supine position and intubated. His O2 sats immediately improve. He was monitored in the IR suite and successfully extubated. Per anesthesia, it was believed that the patient had a delayed response to the anesthetics. The patient denies any SOB, cough, CP or abdominal pain.  HPI:  70 yo male with past medical history of HTN, HLD, CKD and lymphoma treated with chemotherapy with 2 ureteral stents placed 15 years ago presenting with hypoxia after placement of left nephrostomy tube today. His Right sided stent was successfully changed, but left sided stent broke and there was a retained fragment. When visiting [place] about a week ago, he had an episode of hypoglycemia and collapsed. He was found to have hydronephrosis on admission and had a left nephrostomy tube place but it wasn't draining properly. So he went to [facility] and had the left nephrostomy tube removed with  and referred to Dr. Desir for removal of the retained fragment. At urology, imaging showed left sided hydronephrosis and was sent to the ED to have nephrostomy tube placement with  IR. The patient had the placement of the L nephrostomy tube under light sedation with fentanyl and propofol and the case was uneventful. At the conclusion, the patients was noted to desaturated to as low as 50s/60s and was quickly moved into supine position and intubated. His O2 sats immediately improve. He was monitored in the IR suite and successfully extubated. Per anesthesia, it was believed that the patient had a delayed response to the anesthetics. The patient denies any SOB, cough, CP or abdominal pain.

## 2022-04-20 NOTE — ED PROVIDER NOTE - CLINICAL SUMMARY MEDICAL DECISION MAKING FREE TEXT BOX
72 Y/O M PMH HTN, HLD, CKD, Lymphoma, Vertigo H/O B/L Ureteral stents placed many years ago at [facility] as per pt presents for Nephrostomy tube placement. Pt states he follows with Dr. Desir and was advised to come to the ER fo L nephrostomy tube placement, states the plan is for his stent to be removed. Plan is labs to eval for anemia or electrolyte disturbance, Urology consultation.

## 2022-04-20 NOTE — H&P ADULT - PROBLEM SELECTOR PLAN 1
Respiratory failure likely in setting of sedative medication. May be delayed reaction to anesthesia that occurred after procedure. Low suspicion for primary pulmonary issue as contributor of event since patient rapidly recovered.   - Patient now extubated and saturating 100% on room air.   - Will order CXR, but lungs clear on exam  - Monitor on continuous pulse ox for now  - No evidence of infectious process at this time. Respiratory failure likely in setting of sedative medications. May be delayed reaction to anesthesia that occurred after procedure. Low suspicion for primary pulmonary issue as contributor of event since patient rapidly recovered.   - Patient now extubated and maintaining SpO2 100% on room air  - Will order CXR, but lungs with no adventitious breath sounds on exam. No evidence of infectious process at this time.  - Monitor VS q4hrs   - Supplemental O2 PRN

## 2022-04-20 NOTE — H&P ADULT - ATTENDING COMMENTS
72 yo M hx of HTN, HLD, CKD, BPH, R eye blindness, lymphoma, vertigo, hypothyroidism, b/l ureteral stents (placed 15 years ago), presenting to ED from urologist office for L nephrostomy tube placement after being found to have worsening renal function with L hydronephrosis 2/2 retained L ureteral stent. S/p L nephrostomy tube placement by IR with post-procedure acute hypoxic respiratory failure requiring brief intubation. Now extubated and in no respiratory distress, maintaining SpO2 100% RA. Will monitor SCr and urine output. Urology following.

## 2022-04-20 NOTE — H&P ADULT - ASSESSMENT
71 M hx of HTN, HLD, CKD, lymphoma, vertigo, hypothyroidism, B/L ureteral stents (placed 15 years ago), presenting to ED from urologist office for nephrostomy tube placement iso renal failure with severe L hydronephrosis. Procedure was done by IR under light sedation without issue but afterwards, patient became hypoxic requiring intubation. Patient now extubated and admitted for further monitoring.

## 2022-04-20 NOTE — MEDICAL STUDENT ADULT H&P (EDUCATION) - NS MD HP STUD MEDICATIONS FT
Home Medications: Home Medications:  tamsulosin 0.4 qd  Meclizine 25mg 1 tablet BID  Nifedipine 60mg 1tab qd  Labetalol 300mg 1 tablet BID

## 2022-04-20 NOTE — H&P ADULT - NSHPLABSRESULTS_GEN_ALL_CORE
141  |  107  |  59<H>  ----------------------------<  96  5.0   |  20<L>  |  6.96<H>    Ca    8.3<L>      2022 13:20    TPro  6.5  /  Alb  4.2  /  TBili  0.2  /  DBili  x   /  AST  11  /  ALT  11  /  AlkPhos  111            PT/INR - ( 2022 13:20 )   PT: 11.6 sec;   INR: 1.00 ratio         PTT - ( 2022 13:20 )  PTT:35.9 sec              Urinalysis Basic - ( 2022 14:37 )    Color: Light Yellow / Appearance: Clear / S.018 / pH: x  Gluc: x / Ketone: Negative  / Bili: Negative / Urobili: <2 mg/dL   Blood: x / Protein: 100 mg/dL / Nitrite: Negative   Leuk Esterase: Small / RBC: x / WBC 1 /HPF   Sq Epi: x / Non Sq Epi: 1 /HPF / Bacteria: Negative                              9.2    4.78  )-----------( 194      ( 2022 13:20 )             28.7     CAPILLARY BLOOD GLUCOSE      POCT Blood Glucose.: 106 mg/dL (2022 18:35)  POCT Blood Glucose.: 104 mg/dL (2022 11:31) Labs personally reviewed.      141  |  107  |  59<H>  ----------------------------<  96  5.0   |  20<L>  |  6.96<H>    Ca    8.3<L>      2022 13:20    TPro  6.5  /  Alb  4.2  /  TBili  0.2  /  DBili  x   /  AST  11  /  ALT  11  /  AlkPhos  111      PT/INR - ( 2022 13:20 )   PT: 11.6 sec;   INR: 1.00 ratio       PTT - ( 2022 13:20 )  PTT:35.9 sec                Urinalysis Basic - ( 2022 14:37 )  Color: Light Yellow / Appearance: Clear / S.018 / pH: x  Gluc: x / Ketone: Negative  / Bili: Negative / Urobili: <2 mg/dL   Blood: x / Protein: 100 mg/dL / Nitrite: Negative   Leuk Esterase: Small / RBC: x / WBC 1 /HPF   Sq Epi: x / Non Sq Epi: 1 /HPF / Bacteria: Negative                        9.2    4.78  )-----------( 194      ( 2022 13:20 )             28.7     CAPILLARY BLOOD GLUCOSE  POCT Blood Glucose.: 106 mg/dL (2022 18:35)  POCT Blood Glucose.: 104 mg/dL (2022 11:31)    EKG personally reviewed.  NSR at 69 bpm, no acute ischemic changes, QTc 454 ms.    Imaging personally reviewed.  EXAM: [id number] - CT ABDOMEN AND PELVIS  - ORDERED BY: ADOLFO DUCKWORTH  PROCEDURE DATE:  2022    INTERPRETATION:  CLINICAL INFORMATION: Retained ureteral stents  COMPARISON: None.  CONTRAST/COMPLICATIONS:  IV Contrast: NONE  Oral Contrast: NONE  Complications: None reported at time of study completion    PROCEDURE:  CT of the Abdomen and Pelvis was performed.  Sagittal and coronal reformats were performed.    FINDINGS: Evaluation is limited by lack of intravenous contrast.    LOWER CHEST: Calcified right middle lobe granuloma.    LIVER: Within normal limits.  BILE DUCTS: Normal caliber.  GALLBLADDER: Cholelithiasis in a contracted gallbladder.  SPLEEN: Within normal limits.  PANCREAS: Within normal limits.  ADRENALS: Within normal limits.  KIDNEYS/URETERS: Right nephroureteral stent with proximal tip in the   right renal pelvis and distal tip in the urinary bladder. Mild right   hydronephrosis. Right lower pole cyst. Left nephroureteral stent noted   with proximal tip in the mid left ureter and distal tip in the distal   ureter. Severe left hydroureteronephrosis.    BLADDER: Minimally distended.  REPRODUCTIVE ORGANS: Prostate is enlarged.    BOWEL: No bowel obstruction. Diverticulosis of the descending and sigmoid   colon. Normal appendix.  PERITONEUM: No ascites.  VESSELS: Atherosclerotic changes.  RETROPERITONEUM/LYMPH NODES: Soft tissue infiltration in the portacaval   space and surrounding the superior mesenteric axis, extending to the   level of the renal arteries.  ABDOMINAL WALL: Within normal limits.  BONES: Degenerative changes.    IMPRESSION:  Right nephroureteral stent in satisfactory position with mild   hydronephrosis.    Left nephroureteral stent is malpositioned in the mid to distal left   ureter with severe left hydronephrosis.    Nonspecific bilateral perinephric infiltration may represent senescent   change or inflammation/infection.

## 2022-04-20 NOTE — MEDICAL STUDENT ADULT H&P (EDUCATION) - NS MD HP STUD ASPLAN PLAN FT
Hypoxic respiratory failure due to decreased drive  - Likely as a result of delayed reaction to sedation which resolved after a short period on intubation. Currently extubated and is sat 100% on room air    - Monitor oxygen saturation and initiate assitive measures when necessary goal >95%      Hypoxic respiratory failure due to decreased drive  - Likely as a result of delayed reaction to sedation which resolved after a short period on intubation. Currently extubated and is sat 100% on room air    - Monitor oxygen saturation and initiate assitive measures when necessary goal >95%    RUTH  - Cr 6.96 in the ED likely due to renal obstruction as witnessed by the hydronephrosis on outpatient imaging. Will likely improve with draining of nephrosotomy tube  - Monitor CMP q8   - Withhold nephrotoxic agents    T2DM  - Sliding insulin scale for glucose control    HLD  - continue home atorvastatin         Hypoxic respiratory failure due to decreased drive  - Likely as a result of delayed reaction to sedation which resolved after a short period on intubation. Currently extubated and is sat 100% on room air    - Monitor oxygen saturation and initiate assitive measures when necessary goal >95%    RUTH  - Cr 6.96 in the ED likely due to renal obstruction as witnessed by the hydronephrosis on outpatient imaging. Will likely improve with draining of nephrosotomy tube  - Monitor CMP q8   - Withhold nephrotoxic agents      HLD  - continue home atorvastatin

## 2022-04-20 NOTE — H&P ADULT - NSICDXFAMILYHX_GEN_ALL_CORE_FT
FAMILY HISTORY:  No pertinent family history in first degree relatives     FAMILY HISTORY:  FHx: diabetes mellitus  FHx: hypertension

## 2022-04-20 NOTE — H&P ADULT - PROBLEM SELECTOR PLAN 4
Noted to have positive orthostatics in the ED. Patient denies any symptoms of lightheadedness.  - Encourage PO intake/hydration  - IVF resuscitation if needed   - Fall precautions Noted to have positive orthostatics in the ED. Patient denies any symptoms of lightheadedness.  - Encourage PO intake/hydration  - IVF resuscitation if needed   - Repeat orthostatics  - Hold home BP meds for now   - Fall precautions Noted to have positive orthostatics in the ED. Patient denies any symptoms of lightheadedness.  - Encourage PO intake/hydration  - Start gentle IVF with LR at 100 cc/hr overnight  - Repeat orthostatics  - Hold home BP meds (nifedipine and labetalol) for now   - Fall precautions  - PT eval

## 2022-04-20 NOTE — ED ADULT NURSE NOTE - NS ED NOTE ABUSE SUSPICION NEGLECT YN
Patient states pharmacy only dispensed #15 tablets of citalopram 40 mg, as prescription stated to take 1/2 tablet daily. Patient states at her appointment yesterday (8/30/2021) Mele increased citalopram to 40 mg once daily. New prescription, with correct instructions, sent to pharmacy for patient. Patient verbalized understanding and had no questions or concerns.   
Patient states that the pharmacy is requesting changes to the prescription sent for the medication citalopram (CeleXA) 40 MG tablet- Please call to discuss   
No

## 2022-04-20 NOTE — ED PROVIDER NOTE - OBJECTIVE STATEMENT
70 Y/O M PMH HTN HLD CKD Vertigo H/O B/L Ureteral stents placed many years ago at [facility] as per pt presents for Nephrostomy tube placement. Pt states he follows with Dr. Desir and was advised to come to the ER fo L nephrostomy tube placement, states the plan is for his stent to be removed. Pt denies fever, chills or nightsweats, states he is able to make 72 Y/O M PMH HTN, HLD, CKD, Lymphoma, Vertigo H/O B/L Ureteral stents placed many years ago at [facility] as per pt presents for Nephrostomy tube placement. Pt states he follows with Dr. Desir and was advised to come to the ER fo L nephrostomy tube placement, states the plan is for his stent to be removed. Pt denies fever, chills or nightsweats, states he is able to make urine without issues. Pt denies any other sx or acute complaints. 70 Y/O M PMH HTN, HLD, CKD, Lymphoma, Vertigo H/O B/L Ureteral stents placed many years ago at [facility] as per pt presents for Nephrostomy tube placement. Pt states he follows with Dr. Desir and was advised to come to the ER fo L nephrostomy tube placement, states the plan is for his stent to be removed. Pt denies fever, chills or nightsweats, states he is able to make urine without issues. CT performed on 4/16 with a malpositioned L stent. Pt denies any other sx or acute complaints. 70 Y/O M PMH HTN, HLD, CKD, Lymphoma, Vertigo H/O B/L Ureteral stents placed many years ago at [facility] as per pt presents for Nephrostomy tube placement. Pt states he follows with Dr. Desir and was advised to come to the ER fo L nephrostomy tube placement, states the plan is for his stent to be removed. Pt denies fever, chills or nightsweats, states he is able to make urine without issues. CT performed on 4/16 with a malpositioned L stent. Pt denies any other sx or acute complaints.    Gong: On ROS, patient reports 1 year of chronic dizziness when standing, described as lightheadedness, improves prior to ambulating.

## 2022-04-21 DIAGNOSIS — E78.5 HYPERLIPIDEMIA, UNSPECIFIED: ICD-10-CM

## 2022-04-21 DIAGNOSIS — R42 DIZZINESS AND GIDDINESS: ICD-10-CM

## 2022-04-21 DIAGNOSIS — N40.0 BENIGN PROSTATIC HYPERPLASIA WITHOUT LOWER URINARY TRACT SYMPTOMS: ICD-10-CM

## 2022-04-21 DIAGNOSIS — N18.9 CHRONIC KIDNEY DISEASE, UNSPECIFIED: ICD-10-CM

## 2022-04-21 DIAGNOSIS — E03.9 HYPOTHYROIDISM, UNSPECIFIED: ICD-10-CM

## 2022-04-21 DIAGNOSIS — I10 ESSENTIAL (PRIMARY) HYPERTENSION: ICD-10-CM

## 2022-04-21 LAB
ALBUMIN SERPL ELPH-MCNC: 3.7 G/DL — SIGNIFICANT CHANGE UP (ref 3.3–5)
ALP SERPL-CCNC: 111 U/L — SIGNIFICANT CHANGE UP (ref 40–120)
ALT FLD-CCNC: 8 U/L — SIGNIFICANT CHANGE UP (ref 4–41)
ANION GAP SERPL CALC-SCNC: 15 MMOL/L — HIGH (ref 7–14)
AST SERPL-CCNC: 11 U/L — SIGNIFICANT CHANGE UP (ref 4–40)
BASOPHILS # BLD AUTO: 0.02 K/UL — SIGNIFICANT CHANGE UP (ref 0–0.2)
BASOPHILS NFR BLD AUTO: 0.4 % — SIGNIFICANT CHANGE UP (ref 0–2)
BILIRUB SERPL-MCNC: 0.2 MG/DL — SIGNIFICANT CHANGE UP (ref 0.2–1.2)
BUN SERPL-MCNC: 56 MG/DL — HIGH (ref 7–23)
CALCIUM SERPL-MCNC: 8.2 MG/DL — LOW (ref 8.4–10.5)
CHLORIDE SERPL-SCNC: 109 MMOL/L — HIGH (ref 98–107)
CO2 SERPL-SCNC: 18 MMOL/L — LOW (ref 22–31)
CREAT SERPL-MCNC: 6.66 MG/DL — HIGH (ref 0.5–1.3)
CULTURE RESULTS: SIGNIFICANT CHANGE UP
EGFR: 8 ML/MIN/1.73M2 — LOW
EOSINOPHIL # BLD AUTO: 0.23 K/UL — SIGNIFICANT CHANGE UP (ref 0–0.5)
EOSINOPHIL NFR BLD AUTO: 4.3 % — SIGNIFICANT CHANGE UP (ref 0–6)
GLUCOSE SERPL-MCNC: 73 MG/DL — SIGNIFICANT CHANGE UP (ref 70–99)
HCT VFR BLD CALC: 28.8 % — LOW (ref 39–50)
HCV AB S/CO SERPL IA: 0.21 S/CO — SIGNIFICANT CHANGE UP (ref 0–0.99)
HCV AB SERPL-IMP: SIGNIFICANT CHANGE UP
HGB BLD-MCNC: 9.3 G/DL — LOW (ref 13–17)
IANC: 3.6 K/UL — SIGNIFICANT CHANGE UP (ref 1.8–7.4)
IMM GRANULOCYTES NFR BLD AUTO: 0.2 % — SIGNIFICANT CHANGE UP (ref 0–1.5)
LYMPHOCYTES # BLD AUTO: 1.03 K/UL — SIGNIFICANT CHANGE UP (ref 1–3.3)
LYMPHOCYTES # BLD AUTO: 19.3 % — SIGNIFICANT CHANGE UP (ref 13–44)
MAGNESIUM SERPL-MCNC: 1.8 MG/DL — SIGNIFICANT CHANGE UP (ref 1.6–2.6)
MCHC RBC-ENTMCNC: 29.9 PG — SIGNIFICANT CHANGE UP (ref 27–34)
MCHC RBC-ENTMCNC: 32.3 GM/DL — SIGNIFICANT CHANGE UP (ref 32–36)
MCV RBC AUTO: 92.6 FL — SIGNIFICANT CHANGE UP (ref 80–100)
MONOCYTES # BLD AUTO: 0.46 K/UL — SIGNIFICANT CHANGE UP (ref 0–0.9)
MONOCYTES NFR BLD AUTO: 8.6 % — SIGNIFICANT CHANGE UP (ref 2–14)
NEUTROPHILS # BLD AUTO: 3.6 K/UL — SIGNIFICANT CHANGE UP (ref 1.8–7.4)
NEUTROPHILS NFR BLD AUTO: 67.2 % — SIGNIFICANT CHANGE UP (ref 43–77)
NRBC # BLD: 0 /100 WBCS — SIGNIFICANT CHANGE UP
NRBC # FLD: 0 K/UL — SIGNIFICANT CHANGE UP
PHOSPHATE SERPL-MCNC: 4.3 MG/DL — SIGNIFICANT CHANGE UP (ref 2.5–4.5)
PLATELET # BLD AUTO: 183 K/UL — SIGNIFICANT CHANGE UP (ref 150–400)
POTASSIUM SERPL-MCNC: 5 MMOL/L — SIGNIFICANT CHANGE UP (ref 3.5–5.3)
POTASSIUM SERPL-SCNC: 5 MMOL/L — SIGNIFICANT CHANGE UP (ref 3.5–5.3)
PROT SERPL-MCNC: 6.2 G/DL — SIGNIFICANT CHANGE UP (ref 6–8.3)
RBC # BLD: 3.11 M/UL — LOW (ref 4.2–5.8)
RBC # FLD: 14.8 % — HIGH (ref 10.3–14.5)
SODIUM SERPL-SCNC: 142 MMOL/L — SIGNIFICANT CHANGE UP (ref 135–145)
SPECIMEN SOURCE: SIGNIFICANT CHANGE UP
WBC # BLD: 5.35 K/UL — SIGNIFICANT CHANGE UP (ref 3.8–10.5)
WBC # FLD AUTO: 5.35 K/UL — SIGNIFICANT CHANGE UP (ref 3.8–10.5)

## 2022-04-21 RX ORDER — SODIUM CHLORIDE 9 MG/ML
1000 INJECTION, SOLUTION INTRAVENOUS
Refills: 0 | Status: DISCONTINUED | OUTPATIENT
Start: 2022-04-21 | End: 2022-04-21

## 2022-04-21 RX ORDER — SODIUM CHLORIDE 9 MG/ML
1000 INJECTION, SOLUTION INTRAVENOUS
Refills: 0 | Status: DISCONTINUED | OUTPATIENT
Start: 2022-04-21 | End: 2022-04-24

## 2022-04-21 RX ADMIN — HEPARIN SODIUM 5000 UNIT(S): 5000 INJECTION INTRAVENOUS; SUBCUTANEOUS at 21:18

## 2022-04-21 RX ADMIN — ATORVASTATIN CALCIUM 20 MILLIGRAM(S): 80 TABLET, FILM COATED ORAL at 21:18

## 2022-04-21 RX ADMIN — SODIUM CHLORIDE 100 MILLILITER(S): 9 INJECTION, SOLUTION INTRAVENOUS at 12:36

## 2022-04-21 RX ADMIN — TAMSULOSIN HYDROCHLORIDE 0.4 MILLIGRAM(S): 0.4 CAPSULE ORAL at 21:18

## 2022-04-21 RX ADMIN — HEPARIN SODIUM 5000 UNIT(S): 5000 INJECTION INTRAVENOUS; SUBCUTANEOUS at 05:16

## 2022-04-21 RX ADMIN — Medication 25 MICROGRAM(S): at 05:16

## 2022-04-21 RX ADMIN — SODIUM CHLORIDE 100 MILLILITER(S): 9 INJECTION, SOLUTION INTRAVENOUS at 00:35

## 2022-04-21 RX ADMIN — HEPARIN SODIUM 5000 UNIT(S): 5000 INJECTION INTRAVENOUS; SUBCUTANEOUS at 12:36

## 2022-04-21 NOTE — PROGRESS NOTE ADULT - ASSESSMENT
71y Male s/p left PCN placement on 4/20/22 in Interventional Radiology.    Plan:  - Continue drainage, monitor output  - Flush drain 5cc NS qd  - Routine 3 month exchanges. Outpatient IR office [phone]    [phone]

## 2022-04-21 NOTE — PROGRESS NOTE ADULT - SUBJECTIVE AND OBJECTIVE BOX
71y Male s/p left PCN placement on 4/20/22 in Interventional Radiology. Patient seen and examined bedside resting comfortably. No complaints offered.    T(F): 98.1 (04-21-22 @ 05:05), Max: 98.4 (04-20-22 @ 14:40)  HR: 75 (04-21-22 @ 05:05) (73 - 80)  BP: 129/68 (04-21-22 @ 05:05) (129/68 - 156/77)  RR: 18 (04-21-22 @ 05:05) (16 - 18)  SpO2: 100% (04-21-22 @ 05:05) (100% - 100%)  Wt(kg): --    LABS:                        9.3    5.35  )-----------( 183      ( 21 Apr 2022 07:23 )             28.8     04-21    142  |  109<H>  |  56<H>  ----------------------------<  73  5.0   |  18<L>  |  6.66<H>    Ca    8.2<L>      21 Apr 2022 07:23  Phos  4.3     04-21  Mg     1.80     04-21    TPro  6.2  /  Alb  3.7  /  TBili  0.2  /  DBili  x   /  AST  11  /  ALT  8   /  AlkPhos  111  04-21    PT/INR - ( 20 Apr 2022 13:20 )   PT: 11.6 sec;   INR: 1.00 ratio         PTT - ( 20 Apr 2022 13:20 )  PTT:35.9 sec  I&O's Detail    20 Apr 2022 07:01  -  21 Apr 2022 07:00  --------------------------------------------------------  IN:    Oral Fluid: 480 mL  Total IN: 480 mL    OUT:    Nephrostomy Tube (mL): 700 mL    Voided (mL): 200 mL  Total OUT: 900 mL    Total NET: -420 mL      21 Apr 2022 07:01  -  21 Apr 2022 11:29  --------------------------------------------------------  IN:  Total IN: 0 mL    OUT:    Nephrostomy Tube (mL): 150 mL    Voided (mL): 150 mL  Total OUT: 300 mL    Total NET: -300 mL            PHYSICAL EXAM:  General: Nontoxic, in NAD  Left PCN c/d/i, blood tinged urine in bag, flushed with 5cc NS

## 2022-04-21 NOTE — PROGRESS NOTE ADULT - SUBJECTIVE AND OBJECTIVE BOX
Name of Patient : FAYE DIAZ  MRN: [id number]  Date of visit: 22       Subjective: Patient seen and examined. No new events except as noted.   dizziness with standing  orthostatics positive       REVIEW OF SYSTEMS:    CONSTITUTIONAL: No weakness, fevers or chills  EYES/ENT: No visual changes;  No vertigo or throat pain   NECK: No pain or stiffness  RESPIRATORY: No cough, wheezing, hemoptysis; No shortness of breath  CARDIOVASCULAR: No chest pain or palpitations  GASTROINTESTINAL: No abdominal or epigastric pain. No nausea, vomiting, or hematemesis; No diarrhea or constipation. No melena or hematochezia.  GENITOURINARY: No dysuria, frequency or hematuria  NEUROLOGICAL: No numbness or weakness  SKIN: No itching, burning, rashes, or lesions   All other review of systems is negative unless indicated above.    MEDICATIONS:  MEDICATIONS  (STANDING):  atorvastatin 20 milliGRAM(s) Oral at bedtime  heparin   Injectable 5000 Unit(s) SubCutaneous every 8 hours  lactated ringers. 1000 milliLiter(s) (100 mL/Hr) IV Continuous <Continuous>  levothyroxine 25 MICROGram(s) Oral daily  tamsulosin 0.4 milliGRAM(s) Oral at bedtime      PHYSICAL EXAM:  T(C): 37.1 (22 @ 18:15), Max: 37.1 (22 @ 18:15)  HR: 75 (22 @ 18:15) (75 - 80)  BP: 174/80 (22 @ 18:15) (127/62 - 174/80)  RR: 18 (22 @ 18:15) (18 - 18)  SpO2: 100% (22 @ 18:15) (98% - 100%)  Wt(kg): --  I&O's Summary    2022 07:  -  2022 07:00  --------------------------------------------------------  IN: 480 mL / OUT: 900 mL / NET: -420 mL    2022 07:  -  2022 18:49  --------------------------------------------------------  IN: 800 mL / OUT: 1000 mL / NET: -200 mL        Weight (kg): 92.8 ( @ 20:12)    Appearance: Normal	  HEENT:  PERRLA   Lymphatic: No lymphadenopathy   Cardiovascular: Normal S1 S2, no JVD  Respiratory: normal effort , clear  Gastrointestinal:  Soft, Non-tender, nephrostomy tube   Skin: No rashes,  warm to touch  Psychiatry:  Mood & affect appropriate  Musculuskeletal: No edema      All labs, Imaging and EKGs personally reviewed     Culture - Fungal, Other (collected 22 @ 21:10)  Source: .Other LEFT NEPHROSTAMY  Preliminary Report (22 @ 09:18):    Testing in progress          22 @ 07:01  -  22 @ 07:00  --------------------------------------------------------  IN: 480 mL / OUT: 900 mL / NET: -420 mL    22 @ 07:01  -  22 @ 18:49  --------------------------------------------------------  IN: 800 mL / OUT: 1000 mL / NET: -200 mL                            9.3    5.35  )-----------( 183      ( 2022 07:23 )             28.8                   142  |  109<H>  |  56<H>  ----------------------------<  73  5.0   |  18<L>  |  6.66<H>    Ca    8.2<L>      2022 07:23  Phos  4.3       Mg     1.80         TPro  6.2  /  Alb  3.7  /  TBili  0.2  /  DBili  x   /  AST  11  /  ALT  8   /  AlkPhos  111  04-21    PT/INR - ( 2022 13:20 )   PT: 11.6 sec;   INR: 1.00 ratio         PTT - ( 2022 13:20 )  PTT:35.9 sec                   Urinalysis Basic - ( 2022 14:37 )    Color: Light Yellow / Appearance: Clear / S.018 / pH: x  Gluc: x / Ketone: Negative  / Bili: Negative / Urobili: <2 mg/dL   Blood: x / Protein: 100 mg/dL / Nitrite: Negative   Leuk Esterase: Small / RBC: x / WBC 1 /HPF   Sq Epi: x / Non Sq Epi: 1 /HPF / Bacteria: Negative

## 2022-04-21 NOTE — CONSULT NOTE ADULT - ASSESSMENT
70 yo M hx of HTN, HLD, CKD, BPH, R eye blindness, lymphoma, vertigo, hypothyroidism, b/l ureteral stents (placed 15 years ago), presenting to ED from urologist office with worsen L hydro and renal function s/p urgent nephrostomy tube placement by IR.  nephrology consulted for worsen renal failure.    Renal failure  RUTH likely sec to obstruction, now s/p urgent nephrostomy tube placement  renal function improving  unclear baseline  continue gentle IVF   f/u /IR  avoid nephrotoxic agents  monitor bmp.  pt will need further nephrology follow up. Patient advised to follow up with Dr. dewitt in 1-2 weeks after discharge    proteinuria  100 protein in urine  check spot urine p/c ratio  monitor    hypocalcemia  check vit d 25, PTH  monitor    anemia   monitor   check iron panel   70 yo M hx of HTN, HLD, CKD, BPH, R eye blindness, lymphoma, vertigo, hypothyroidism, b/l ureteral stents (placed 15 years ago), presenting to ED from urologist office with worsen L hydro and renal function s/p urgent nephrostomy tube placement by IR.  nephrology consulted for worsen renal failure.    Renal failure  RUTH likely sec to obstruction, now s/p urgent nephrostomy tube placement  renal function improving  unclear baseline, in 07/21 Scr 2.5  continue IVF 100cc/hr, change to 75cc/hr in AM  Monitor I/O  f/u /IR  avoid nephrotoxic agents  monitor bmp.  pt will need further nephrology follow up. Patient advised to follow up with Dr. dewitt in 1-2 weeks after discharge    proteinuria  100 protein in urine  check spot urine p/c ratio  monitor    hypocalcemia  check vit d 25, PTH  monitor    anemia   monitor   check iron panel

## 2022-04-22 LAB
ANION GAP SERPL CALC-SCNC: 14 MMOL/L — SIGNIFICANT CHANGE UP (ref 7–14)
BASOPHILS # BLD AUTO: 0.02 K/UL — SIGNIFICANT CHANGE UP (ref 0–0.2)
BASOPHILS NFR BLD AUTO: 0.4 % — SIGNIFICANT CHANGE UP (ref 0–2)
BUN SERPL-MCNC: 52 MG/DL — HIGH (ref 7–23)
CALCIUM SERPL-MCNC: 8.3 MG/DL — LOW (ref 8.4–10.5)
CHLORIDE SERPL-SCNC: 106 MMOL/L — SIGNIFICANT CHANGE UP (ref 98–107)
CO2 SERPL-SCNC: 20 MMOL/L — LOW (ref 22–31)
CREAT SERPL-MCNC: 6.03 MG/DL — HIGH (ref 0.5–1.3)
EGFR: 9 ML/MIN/1.73M2 — LOW
EOSINOPHIL # BLD AUTO: 0.3 K/UL — SIGNIFICANT CHANGE UP (ref 0–0.5)
EOSINOPHIL NFR BLD AUTO: 5.9 % — SIGNIFICANT CHANGE UP (ref 0–6)
GLUCOSE SERPL-MCNC: 88 MG/DL — SIGNIFICANT CHANGE UP (ref 70–99)
HCT VFR BLD CALC: 29.9 % — LOW (ref 39–50)
HGB BLD-MCNC: 9.6 G/DL — LOW (ref 13–17)
IANC: 3.18 K/UL — SIGNIFICANT CHANGE UP (ref 1.8–7.4)
IMM GRANULOCYTES NFR BLD AUTO: 0.2 % — SIGNIFICANT CHANGE UP (ref 0–1.5)
LYMPHOCYTES # BLD AUTO: 1.11 K/UL — SIGNIFICANT CHANGE UP (ref 1–3.3)
LYMPHOCYTES # BLD AUTO: 21.9 % — SIGNIFICANT CHANGE UP (ref 13–44)
MAGNESIUM SERPL-MCNC: 1.8 MG/DL — SIGNIFICANT CHANGE UP (ref 1.6–2.6)
MCHC RBC-ENTMCNC: 30 PG — SIGNIFICANT CHANGE UP (ref 27–34)
MCHC RBC-ENTMCNC: 32.1 GM/DL — SIGNIFICANT CHANGE UP (ref 32–36)
MCV RBC AUTO: 93.4 FL — SIGNIFICANT CHANGE UP (ref 80–100)
MONOCYTES # BLD AUTO: 0.45 K/UL — SIGNIFICANT CHANGE UP (ref 0–0.9)
MONOCYTES NFR BLD AUTO: 8.9 % — SIGNIFICANT CHANGE UP (ref 2–14)
NEUTROPHILS # BLD AUTO: 3.18 K/UL — SIGNIFICANT CHANGE UP (ref 1.8–7.4)
NEUTROPHILS NFR BLD AUTO: 62.7 % — SIGNIFICANT CHANGE UP (ref 43–77)
NRBC # BLD: 0 /100 WBCS — SIGNIFICANT CHANGE UP
NRBC # FLD: 0 K/UL — SIGNIFICANT CHANGE UP
PHOSPHATE SERPL-MCNC: 4.2 MG/DL — SIGNIFICANT CHANGE UP (ref 2.5–4.5)
PLATELET # BLD AUTO: 177 K/UL — SIGNIFICANT CHANGE UP (ref 150–400)
POTASSIUM SERPL-MCNC: 4.9 MMOL/L — SIGNIFICANT CHANGE UP (ref 3.5–5.3)
POTASSIUM SERPL-SCNC: 4.9 MMOL/L — SIGNIFICANT CHANGE UP (ref 3.5–5.3)
RBC # BLD: 3.2 M/UL — LOW (ref 4.2–5.8)
RBC # FLD: 14.7 % — HIGH (ref 10.3–14.5)
SODIUM SERPL-SCNC: 140 MMOL/L — SIGNIFICANT CHANGE UP (ref 135–145)
WBC # BLD: 5.07 K/UL — SIGNIFICANT CHANGE UP (ref 3.8–10.5)
WBC # FLD AUTO: 5.07 K/UL — SIGNIFICANT CHANGE UP (ref 3.8–10.5)

## 2022-04-22 PROCEDURE — 93306 TTE W/DOPPLER COMPLETE: CPT | Mod: 26

## 2022-04-22 RX ORDER — SODIUM CHLORIDE 9 MG/ML
1000 INJECTION INTRAMUSCULAR; INTRAVENOUS; SUBCUTANEOUS
Refills: 0 | Status: DISCONTINUED | OUTPATIENT
Start: 2022-04-22 | End: 2022-04-23

## 2022-04-22 RX ORDER — SODIUM CHLORIDE 9 MG/ML
1000 INJECTION, SOLUTION INTRAVENOUS
Refills: 0 | Status: DISCONTINUED | OUTPATIENT
Start: 2022-04-22 | End: 2022-04-22

## 2022-04-22 RX ADMIN — HEPARIN SODIUM 5000 UNIT(S): 5000 INJECTION INTRAVENOUS; SUBCUTANEOUS at 21:22

## 2022-04-22 RX ADMIN — TAMSULOSIN HYDROCHLORIDE 0.4 MILLIGRAM(S): 0.4 CAPSULE ORAL at 21:21

## 2022-04-22 RX ADMIN — ATORVASTATIN CALCIUM 20 MILLIGRAM(S): 80 TABLET, FILM COATED ORAL at 21:21

## 2022-04-22 RX ADMIN — Medication 25 MICROGRAM(S): at 05:44

## 2022-04-22 RX ADMIN — HEPARIN SODIUM 5000 UNIT(S): 5000 INJECTION INTRAVENOUS; SUBCUTANEOUS at 05:44

## 2022-04-22 RX ADMIN — SODIUM CHLORIDE 100 MILLILITER(S): 9 INJECTION INTRAMUSCULAR; INTRAVENOUS; SUBCUTANEOUS at 11:25

## 2022-04-22 RX ADMIN — HEPARIN SODIUM 5000 UNIT(S): 5000 INJECTION INTRAVENOUS; SUBCUTANEOUS at 13:25

## 2022-04-22 NOTE — PROGRESS NOTE ADULT - SUBJECTIVE AND OBJECTIVE BOX
Name of Patient : FAYE DIAZ  MRN: [id number]  Date of visit: 04-22-22       Subjective: Patient seen and examined. No new events except as noted.   feeling slightly better  dizziness with standing       REVIEW OF SYSTEMS:    CONSTITUTIONAL: No weakness, fevers or chills  EYES/ENT: No visual changes;  No vertigo or throat pain   NECK: No pain or stiffness  RESPIRATORY: No cough, wheezing, hemoptysis; No shortness of breath  CARDIOVASCULAR: No chest pain or palpitations  GASTROINTESTINAL: No abdominal or epigastric pain. No nausea, vomiting, or hematemesis; No diarrhea or constipation. No melena or hematochezia.  GENITOURINARY: No dysuria, frequency or hematuria  NEUROLOGICAL: No numbness or weakness  SKIN: No itching, burning, rashes, or lesions   All other review of systems is negative unless indicated above.    MEDICATIONS:  MEDICATIONS  (STANDING):  atorvastatin 20 milliGRAM(s) Oral at bedtime  heparin   Injectable 5000 Unit(s) SubCutaneous every 8 hours  lactated ringers. 1000 milliLiter(s) (100 mL/Hr) IV Continuous <Continuous>  levothyroxine 25 MICROGram(s) Oral daily  sodium chloride 0.9%. 1000 milliLiter(s) (100 mL/Hr) IV Continuous <Continuous>  tamsulosin 0.4 milliGRAM(s) Oral at bedtime      PHYSICAL EXAM:  T(C): 36.7 (04-22-22 @ 17:15), Max: 36.8 (04-22-22 @ 09:15)  HR: 98 (04-22-22 @ 17:15) (75 - 98)  BP: 117/81 (04-22-22 @ 17:15) (117/72 - 145/82)  RR: 16 (04-22-22 @ 17:15) (16 - 18)  SpO2: 100% (04-22-22 @ 17:15) (100% - 100%)  Wt(kg): --  I&O's Summary    21 Apr 2022 07:01  -  22 Apr 2022 07:00  --------------------------------------------------------  IN: 1040 mL / OUT: 2550 mL / NET: -1510 mL    22 Apr 2022 07:01  -  22 Apr 2022 22:05  --------------------------------------------------------  IN: 0 mL / OUT: 750 mL / NET: -750 mL          Appearance: Normal	  HEENT:  PERRLA   Lymphatic: No lymphadenopathy   Cardiovascular: Normal S1 S2, no JVD  Respiratory: normal effort , clear  Gastrointestinal:  Soft, nephrostomy   Skin: No rashes,  warm to touch  Psychiatry:  Mood & affect appropriate  Musculuskeletal: No edema      All labs, Imaging and EKGs personally reviewed       04-21-22 @ 07:01  -  04-22-22 @ 07:00  --------------------------------------------------------  IN: 1040 mL / OUT: 2550 mL / NET: -1510 mL    04-22-22 @ 07:01  -  04-22-22 @ 22:05  --------------------------------------------------------  IN: 0 mL / OUT: 750 mL / NET: -750 mL                          9.6    5.07  )-----------( 177      ( 22 Apr 2022 08:07 )             29.9               04-22    140  |  106  |  52<H>  ----------------------------<  88  4.9   |  20<L>  |  6.03<H>    Ca    8.3<L>      22 Apr 2022 08:07  Phos  4.2     04-22  Mg     1.80     04-22    TPro  6.2  /  Alb  3.7  /  TBili  0.2  /  DBili  x   /  AST  11  /  ALT  8   /  AlkPhos  111  04-21

## 2022-04-22 NOTE — CONSULT NOTE ADULT - ASSESSMENT
Orthostatic hypotension   likely multifactorial   from acute illness, RUTH, resp failure   deconditioned state   hydration   monitor orthostatic vitals  needs aggressive PT   will consider midodrine pending clinical progression     HTN  BP meds on hold for now given profound O.H.    RUTH  as per renal     Diastolic CHF  mild   grade I   stable

## 2022-04-22 NOTE — PROGRESS NOTE ADULT - SUBJECTIVE AND OBJECTIVE BOX
[facility] [facility]   MD KWASI SUH PA    TEL:  OFFICE: [phone]  DR ALVAREZ CELL: [phone]  DR. ENRIQUEZ CELL: [phone]  KUMAR RIVERA CELL: [phone]    From 5pm-7am Answering Service [phone]    -- RENAL FOLLOW UP NOTE ---Date of Service 04-22-22 @ 11:50    Patient is a 71y old  Male who presents with a chief complaint of Hypoxic respiratory failure s/p sedation (22 Apr 2022 08:14)      Patient seen and examined at bedside. No chest pain/sob    VITALS:  T(F): 98.2 (04-22-22 @ 09:15), Max: 98.7 (04-21-22 @ 18:15)  HR: 79 (04-22-22 @ 09:15)  BP: 136/67 (04-22-22 @ 09:15)  RR: 18 (04-22-22 @ 09:15)  SpO2: 100% (04-22-22 @ 09:15)  Wt(kg): --    04-21 @ 07:01  -  04-22 @ 07:00  --------------------------------------------------------  IN: 1040 mL / OUT: 2550 mL / NET: -1510 mL    04-22 @ 07:01  -  04-22 @ 11:50  --------------------------------------------------------  IN: 0 mL / OUT: 200 mL / NET: -200 mL          PHYSICAL EXAM:  Constitutional: NAD  Neck: No JVD  Respiratory: CTAB, no wheezes, rales or rhonchi  Cardiovascular: S1, S2, RRR  Gastrointestinal: BS+, soft, NT/ND  : left NT draining well  Extremities: No peripheral edema    Hospital Medications:   MEDICATIONS  (STANDING):  atorvastatin 20 milliGRAM(s) Oral at bedtime  heparin   Injectable 5000 Unit(s) SubCutaneous every 8 hours  lactated ringers. 1000 milliLiter(s) (100 mL/Hr) IV Continuous <Continuous>  levothyroxine 25 MICROGram(s) Oral daily  sodium chloride 0.9%. 1000 milliLiter(s) (100 mL/Hr) IV Continuous <Continuous>  tamsulosin 0.4 milliGRAM(s) Oral at bedtime      LABS:  04-22    140  |  106  |  52<H>  ----------------------------<  88  4.9   |  20<L>  |  6.03<H>    Ca    8.3<L>      22 Apr 2022 08:07  Phos  4.2     04-22  Mg     1.80     04-22    TPro  6.2  /  Alb  3.7  /  TBili  0.2  /  DBili      /  AST  11  /  ALT  8   /  AlkPhos  111  04-21    Creatinine Trend: 6.03 <--, 6.66 <--, 6.75 <--, 6.96 <--    Phosphorus Level, Serum: 4.2 mg/dL (04-22 @ 08:07)                              9.6    5.07  )-----------( 177      ( 22 Apr 2022 08:07 )             29.9     Urine Studies:  Urinalysis - [04-20-22 @ 14:37]      Color Light Yellow / Appearance Clear / SG 1.018 / pH 6.5      Gluc Negative / Ketone Negative  / Bili Negative / Urobili <2 mg/dL       Blood Small / Protein 100 mg/dL / Leuk Est Small / Nitrite Negative      RBC  / WBC 1 / Hyaline  / Gran  / Sq Epi  / Non Sq Epi 1 / Bacteria Negative      Iron 62, TIBC 226, %sat 27      [04-20-22 @ 21:34]  Ferritin 293      [04-20-22 @ 21:34]    HCV 0.21, Nonreact      [04-21-22 @ 09:26]      RADIOLOGY & ADDITIONAL STUDIES:

## 2022-04-22 NOTE — CONSULT NOTE ADULT - REASON FOR ADMISSION
Hypoxic respiratory failure s/p sedation

## 2022-04-22 NOTE — PHYSICAL THERAPY INITIAL EVALUATION ADULT - ADDITIONAL COMMENTS
patient lives with son and daughter in private home, 5 steps to enter and flight to bedroom/bathroom. Patient was independent in all ADL prior to admission. patient was using cane and walker prior to admission for ambulation.

## 2022-04-22 NOTE — CONSULT NOTE ADULT - SUBJECTIVE AND OBJECTIVE BOX
[facility]   MD KWASI SUH PA        TEL:  OFFICE: [phone]  DR ALVAREZ CELL: [phone]  DR. ENRIQUEZ CELL: [phone]  KUMAR RIVERA CELL: [phone]    From 5pm-7am answering service [phone]    --- INITIAL RENAL CONSULT NOTE ---date of service 22 @ 12:45    HPI:  70 yo M hx of HTN, HLD, CKD, BPH, R eye blindness, lymphoma, vertigo, hypothyroidism, b/l ureteral stents (placed 15 years ago), presenting to ED from urologist office for nephrostomy tube placement. Pt was at [facility] in [facility] where he was noted to have renal failure in the setting of retained b/l ureteral stents. R ureteral stent was successfully exchanged, however L ureteral stent broke and a piece was retained. A left nephrostomy tube was placed at that time. However, the left nephrostomy tube was not draining properly, so patient presented to [facility] recently where the tube was removed. SCr was noted to be 4.5 at [facility]. Pt was referred to Dr. Desir for removal of retained encrusted stent. Patient was noted to have worsening L hydronephrosis at that visit and was sent to ED for urgent nephrostomy tube placement. IR was consulted and procedure done with light sedation (fentanyl and propofol) without issues. However after the procedure, patient became hypoxic to 50-60s and had to be subsequently intubated. Patient was monitored in the IR suite and was successfully extubated shortly afterwards.     nephrology consulted for worsen renal failure, pt seen and examined at bedside, L nephrostomy tube in place with good drain, currently denied complaints         Allergies:  No Known Allergies      PAST MEDICAL & SURGICAL HISTORY:  Hypertension    Vertigo    Lymphoma    Chronic kidney disease, unspecified CKD stage    Hypothyroidism    Hyperlipidemia    BPH (benign prostatic hyperplasia)    S/P urological surgery  B/L uretral stents years ago        Home Medications Reviewed    Hospital Medications:   MEDICATIONS  (STANDING):  atorvastatin 20 milliGRAM(s) Oral at bedtime  heparin   Injectable 5000 Unit(s) SubCutaneous every 8 hours  lactated ringers. 1000 milliLiter(s) (100 mL/Hr) IV Continuous <Continuous>  levothyroxine 25 MICROGram(s) Oral daily  tamsulosin 0.4 milliGRAM(s) Oral at bedtime      SOCIAL HISTORY:  Denies ETOh, Smoking,     FAMILY HISTORY:  FHx: diabetes mellitus    FHx: hypertension        REVIEW OF SYSTEMS:  CONSTITUTIONAL: No weakness, fevers or chills  EYES/ENT: No visual changes;  No vertigo or throat pain   NECK: No pain or stiffness  RESPIRATORY: No cough, wheezing, hemoptysis; No shortness of breath  CARDIOVASCULAR: No chest pain or palpitations.  GASTROINTESTINAL: No abdominal or epigastric pain. No nausea, vomiting, or hematemesis; No diarrhea or constipation. No melena or hematochezia.  GENITOURINARY: see hpi  NEUROLOGICAL: No numbness or weakness  SKIN: No itching, burning, rashes, or lesions   VASCULAR: No bilateral lower extremity edema.   All other review of systems is negative unless indicated above.    VITALS:  T(F): 98 (22 @ 12:13), Max: 98.4 (22 @ 14:40)  HR: 75 (22 @ 12:13)  BP: 127/62 (22 @ 12:13)  RR: 18 (22 @ 12:13)  SpO2: 100% (22 @ 12:13)  Wt(kg): --     @ 07:  -   @ 07:00  --------------------------------------------------------  IN: 480 mL / OUT: 900 mL / NET: -420 mL     @ 07:  -   @ 12:45  --------------------------------------------------------  IN: 0 mL / OUT: 300 mL / NET: -300 mL        Weight (kg): 92.8 ( @ 20:12)    PHYSICAL EXAM:  Constitutional: NAD  HEENT: anicteric sclera, oropharynx clear, MMM  Neck: No JVD  Respiratory: CTAB, no wheezes, rales or rhonchi  Cardiovascular: S1, S2, RRR  Gastrointestinal: BS+, soft, NT/ND  Extremities: No cyanosis or clubbing. No peripheral edema  Neurological: A/O x 3, no focal deficits  Psychiatric: Normal mood, normal affect  : left nephrostomy tube. yellow urine with slight blood tinge in drain  Skin: No rashes  Vascular Access: none    LABS:      142  |  109<H>  |  56<H>  ----------------------------<  73  5.0   |  18<L>  |  6.66<H>    Ca    8.2<L>      2022 07:23  Phos  4.3       Mg     1.80         TPro  6.2  /  Alb  3.7  /  TBili  0.2  /  DBili      /  AST  11  /  ALT  8   /  AlkPhos  111      Creatinine Trend: 6.66 <--, 6.75 <--, 6.96 <--                        9.3    5.35  )-----------( 183      ( 2022 07:23 )             28.8     Urine Studies:  Urinalysis Basic - ( 2022 14:37 )    Color: Light Yellow / Appearance: Clear / S.018 / pH:   Gluc:  / Ketone: Negative  / Bili: Negative / Urobili: <2 mg/dL   Blood:  / Protein: 100 mg/dL / Nitrite: Negative   Leuk Esterase: Small / RBC:  / WBC 1 /HPF   Sq Epi:  / Non Sq Epi: 1 /HPF / Bacteria: Negative          RADIOLOGY & ADDITIONAL STUDIES:                
HPI  72yo M with hx of HTN, HLD, CKD, lymphoma, vertigo, hypothyroidism, B/L ureteral stents (placed 15 years ago), presenting to ED from urologist office for nephrostomy tube placement. Patient recently had successful right sided ureteral stent exchange, however his left stent was unable to be exchanged and fragment of the stent remained in the ureter. CT on  revealed correct placement of right stent, however left stent fragment persists with concominent L hydronephrosis. IN the office patient was found to have hyperkalemia, in the ED K was 5. Patient denies symptoms of chest pain, flank pain, fevers, or dysuria at this time. No gross hematuria as well. Vitals stable in the ED.    PAST MEDICAL & SURGICAL HISTORY:  Hypertension    Hyperlipemia    Vertigo    Lymphoma    Chronic kidney disease, unspecified CKD stage    Hypothyroidism    S/P urological surgery  B/L uretral stents years ago        MEDICATIONS  (STANDING):  atorvastatin 20 milliGRAM(s) Oral at bedtime  heparin   Injectable 5000 Unit(s) SubCutaneous every 8 hours  lactated ringers. 1000 milliLiter(s) (100 mL/Hr) IV Continuous <Continuous>  levothyroxine 25 MICROGram(s) Oral daily  tamsulosin 0.4 milliGRAM(s) Oral at bedtime    MEDICATIONS  (PRN):      FAMILY HISTORY:  FHx: diabetes mellitus    FHx: hypertension        Allergies    No Known Allergies    Intolerances        SOCIAL HISTORY:    REVIEW OF SYSTEMS: Otherwise negative as stated in HPI    Physical Exam  Vital signs  T(C): 36.7 (22 @ 05:05), Max: 36.9 (22 @ 14:40)  HR: 75 (22 @ 05:05)  BP: 129/68 (22 @ 05:05)  SpO2: 100% (22 @ 05:05)  Wt(kg): --    Output    OUT:    Nephrostomy Tube (mL): 700 mL    Voided (mL): 200 mL  Total OUT: 900 mL    Total NET: -900 mL          Gen: awake and alert. NAD.   Pulm: Normal work of breathing on RA  Abd: Soft, nontender, nondistended.   : no CVAT bilaterally    LABS:       @ 07:23    WBC 5.35  / Hct 28.8  / SCr 6.66      @ 21:34    WBC --    / Hct --    / SCr 6.75         142  |  109<H>  |  56<H>  ----------------------------<  73  5.0   |  18<L>  |  6.66<H>    Ca    8.2<L>      2022 07:23  Phos  4.3       Mg     1.80         TPro  6.2  /  Alb  3.7  /  TBili  0.2  /  DBili  x   /  AST  11  /  ALT  8   /  AlkPhos  111      PT/INR - ( 2022 13:20 )   PT: 11.6 sec;   INR: 1.00 ratio         PTT - ( 2022 13:20 )  PTT:35.9 sec  Urinalysis Basic - ( 2022 14:37 )    Color: Light Yellow / Appearance: Clear / S.018 / pH: x  Gluc: x / Ketone: Negative  / Bili: Negative / Urobili: <2 mg/dL   Blood: x / Protein: 100 mg/dL / Nitrite: Negative   Leuk Esterase: Small / RBC: x / WBC 1 /HPF   Sq Epi: x / Non Sq Epi: 1 /HPF / Bacteria: Negative          RADIOLOGY:  CT ABDOMEN AND PELVIS - ORDERED BY: ADOLFO DUCKWORTH      PROCEDURE DATE: 2022        INTERPRETATION: CLINICAL INFORMATION: Retained ureteral stents    COMPARISON: None.    CONTRAST/COMPLICATIONS:  IV Contrast: NONE  Oral Contrast: NONE  Complications: None reported at time of study completion    PROCEDURE:  CT of the Abdomen and Pelvis was performed.  Sagittal and coronal reformats were performed.    FINDINGS: Evaluation is limited by lack of intravenous contrast.    LOWER CHEST: Calcified right middle lobe granuloma.    LIVER: Within normal limits.  BILE DUCTS: Normal caliber.  GALLBLADDER: Cholelithiasis in a contracted gallbladder.  SPLEEN: Within normal limits.  PANCREAS: Within normal limits.  ADRENALS: Within normal limits.  KIDNEYS/URETERS: Right nephroureteral stent with proximal tip in the right renal pelvis and distal tip in the urinary bladder. Mild right hydronephrosis. Right lower pole cyst. Left nephroureteral stent noted with proximal tip in the mid left ureter and distal tip in the distal ureter. Severe left hydroureteronephrosis.    BLADDER: Minimally distended.  REPRODUCTIVE ORGANS: Prostate is enlarged.    BOWEL: No bowel obstruction. Diverticulosis of the descending and sigmoid colon. Normal appendix.  PERITONEUM: No ascites.  VESSELS: Atherosclerotic changes.  RETROPERITONEUM/LYMPH NODES: Soft tissue infiltration in the portacaval space and surrounding the superior mesenteric axis, extending to the level of the renal arteries.  ABDOMINAL WALL: Within normal limits.  BONES: Degenerative changes.    IMPRESSION:  Right nephroureteral stent in satisfactory position with mild hydronephrosis.    Left nephroureteral stent is malpositioned in the mid to distal left ureter with severe left hydronephrosis.    Nonspecific bilateral perinephric infiltration may represent senescent change or inflammation/infection.    Soft tissue infiltration in the upper abdominal retroperitoneum is poorly evaluated without intravenous contrast. This may represent retroperitoneal fibrosis, lymphadenopathy, edema, or inflammatory change. Correlation with prior cross-sectional imaging is recommended.        --- End of Report ---    
CHIEF COMPLAINT:Patient is a 71y old  Male who presents with a chief complaint of Hypoxic respiratory failure s/p sedation (21 Apr 2022 18:49)      HISTORY OF PRESENT ILLNESS:    71 M hx of HTN, HLD, CKD, lymphoma, vertigo, hypothyroidism, B/L ureteral stents (placed 15 years ago), presenting to ED from urologist office for nephrostomy tube placement iso renal failure with severe L hydronephrosis. Procedure was done by IR under light sedation without issue but afterwards, patient became hypoxic requiring intubation. Patient now extubated and admitted for further monitoring. currently pt laying in bed  denies any chest pain, sob, palpitation, dizziness or syncope.   cardiology is called for orthostatic hypotension     PAST MEDICAL & SURGICAL HISTORY:  Hypertension    Vertigo    Lymphoma    Chronic kidney disease, unspecified CKD stage    Hypothyroidism    Hyperlipidemia    BPH (benign prostatic hyperplasia)    S/P urological surgery  B/L uretral stents years ago            MEDICATIONS:  heparin   Injectable 5000 Unit(s) SubCutaneous every 8 hours  tamsulosin 0.4 milliGRAM(s) Oral at bedtime            atorvastatin 20 milliGRAM(s) Oral at bedtime  levothyroxine 25 MICROGram(s) Oral daily    lactated ringers. 1000 milliLiter(s) IV Continuous <Continuous>      FAMILY HISTORY:  FHx: diabetes mellitus    FHx: hypertension        Non-contributory    SOCIAL HISTORY:    No tobacco, drugs or etoh    Allergies    No Known Allergies    Intolerances    	    REVIEW OF SYSTEMS:  as above  The rest of the 14 points ROS reviewed and except above they are unremarkable.        PHYSICAL EXAM:  T(C): 36.7 (04-22-22 @ 05:15), Max: 37.1 (04-21-22 @ 18:15)  HR: 81 (04-22-22 @ 05:15) (75 - 81)  BP: 137/64 (04-22-22 @ 05:15) (127/62 - 174/80)  RR: 18 (04-22-22 @ 05:15) (18 - 18)  SpO2: 100% (04-22-22 @ 05:15) (98% - 100%)  Wt(kg): --  I&O's Summary    21 Apr 2022 07:01  -  22 Apr 2022 07:00  --------------------------------------------------------  IN: 1040 mL / OUT: 2550 mL / NET: -1510 mL      JVP: Normal  Neck: supple  Lung: clear   CV: S1 S2 , Murmur:  Abd: soft  Ext: No edema  neuro: Awake / alert  Psych: flat affect  Skin: normal    LABS/DATA:    TELEMETRY: 	  sinus   ECG:  	   	  CARDIAC MARKERS:      < from: Transthoracic Echocardiogram (04.22.22 @ 06:24) >  CONCLUSIONS:  1. Mitral annular calcification, otherwise normal mitral  valve. Minimal mitral regurgitation.  2. Normal left ventricular internal dimensions and wall  thicknesses.  3. Normal left ventricular systolic function. No segmental  wall motion abnormalities.  4. Mild diastolic dysfunction (Stage I).  5. Normal right ventricular size and function.  No obvious cardiac source of embolus was identified on this  transthoracic study.  If clinical suspicion is high,  consider KATELYNN for further evaluation.  ------------------------------------------------------------------------  Confirmed on  4/22/2022 - 07:30:04 by Marc Peterson M.D.,  [facility], [facility]    < end of copied text >                                  9.3    5.35  )-----------( 183      ( 21 Apr 2022 07:23 )             28.8     04-21    142  |  109<H>  |  56<H>  ----------------------------<  73  5.0   |  18<L>  |  6.66<H>    Ca    8.2<L>      21 Apr 2022 07:23  Phos  4.3     04-21  Mg     1.80     04-21    TPro  6.2  /  Alb  3.7  /  TBili  0.2  /  DBili  x   /  AST  11  /  ALT  8   /  AlkPhos  111  04-21    proBNP:   Lipid Profile:   HgA1c:   TSH:

## 2022-04-23 LAB
ANION GAP SERPL CALC-SCNC: 15 MMOL/L — HIGH (ref 7–14)
BUN SERPL-MCNC: 51 MG/DL — HIGH (ref 7–23)
CALCIUM SERPL-MCNC: 8.2 MG/DL — LOW (ref 8.4–10.5)
CHLORIDE SERPL-SCNC: 107 MMOL/L — SIGNIFICANT CHANGE UP (ref 98–107)
CO2 SERPL-SCNC: 20 MMOL/L — LOW (ref 22–31)
CREAT SERPL-MCNC: 5.58 MG/DL — HIGH (ref 0.5–1.3)
EGFR: 10 ML/MIN/1.73M2 — LOW
GLUCOSE SERPL-MCNC: 75 MG/DL — SIGNIFICANT CHANGE UP (ref 70–99)
HCT VFR BLD CALC: 30 % — LOW (ref 39–50)
HGB BLD-MCNC: 9.5 G/DL — LOW (ref 13–17)
MAGNESIUM SERPL-MCNC: 1.8 MG/DL — SIGNIFICANT CHANGE UP (ref 1.6–2.6)
MCHC RBC-ENTMCNC: 29.6 PG — SIGNIFICANT CHANGE UP (ref 27–34)
MCHC RBC-ENTMCNC: 31.7 GM/DL — LOW (ref 32–36)
MCV RBC AUTO: 93.5 FL — SIGNIFICANT CHANGE UP (ref 80–100)
NRBC # BLD: 0 /100 WBCS — SIGNIFICANT CHANGE UP
NRBC # FLD: 0 K/UL — SIGNIFICANT CHANGE UP
PHOSPHATE SERPL-MCNC: 4 MG/DL — SIGNIFICANT CHANGE UP (ref 2.5–4.5)
PLATELET # BLD AUTO: 167 K/UL — SIGNIFICANT CHANGE UP (ref 150–400)
POTASSIUM SERPL-MCNC: 4.9 MMOL/L — SIGNIFICANT CHANGE UP (ref 3.5–5.3)
POTASSIUM SERPL-SCNC: 4.9 MMOL/L — SIGNIFICANT CHANGE UP (ref 3.5–5.3)
RBC # BLD: 3.21 M/UL — LOW (ref 4.2–5.8)
RBC # FLD: 14.8 % — HIGH (ref 10.3–14.5)
SODIUM SERPL-SCNC: 142 MMOL/L — SIGNIFICANT CHANGE UP (ref 135–145)
WBC # BLD: 4.41 K/UL — SIGNIFICANT CHANGE UP (ref 3.8–10.5)
WBC # FLD AUTO: 4.41 K/UL — SIGNIFICANT CHANGE UP (ref 3.8–10.5)

## 2022-04-23 RX ORDER — SODIUM CHLORIDE 9 MG/ML
1000 INJECTION, SOLUTION INTRAVENOUS
Refills: 0 | Status: DISCONTINUED | OUTPATIENT
Start: 2022-04-23 | End: 2022-04-29

## 2022-04-23 RX ORDER — MIDODRINE HYDROCHLORIDE 2.5 MG/1
5 TABLET ORAL THREE TIMES A DAY
Refills: 0 | Status: DISCONTINUED | OUTPATIENT
Start: 2022-04-23 | End: 2022-04-26

## 2022-04-23 RX ADMIN — SODIUM CHLORIDE 100 MILLILITER(S): 9 INJECTION INTRAMUSCULAR; INTRAVENOUS; SUBCUTANEOUS at 06:34

## 2022-04-23 RX ADMIN — HEPARIN SODIUM 5000 UNIT(S): 5000 INJECTION INTRAVENOUS; SUBCUTANEOUS at 06:35

## 2022-04-23 RX ADMIN — ATORVASTATIN CALCIUM 20 MILLIGRAM(S): 80 TABLET, FILM COATED ORAL at 21:23

## 2022-04-23 RX ADMIN — HEPARIN SODIUM 5000 UNIT(S): 5000 INJECTION INTRAVENOUS; SUBCUTANEOUS at 13:45

## 2022-04-23 RX ADMIN — HEPARIN SODIUM 5000 UNIT(S): 5000 INJECTION INTRAVENOUS; SUBCUTANEOUS at 21:23

## 2022-04-23 RX ADMIN — Medication 0.1 MILLIGRAM(S): at 17:58

## 2022-04-23 RX ADMIN — MIDODRINE HYDROCHLORIDE 5 MILLIGRAM(S): 2.5 TABLET ORAL at 13:45

## 2022-04-23 RX ADMIN — MIDODRINE HYDROCHLORIDE 5 MILLIGRAM(S): 2.5 TABLET ORAL at 21:24

## 2022-04-23 RX ADMIN — Medication 25 MICROGRAM(S): at 06:35

## 2022-04-23 RX ADMIN — TAMSULOSIN HYDROCHLORIDE 0.4 MILLIGRAM(S): 0.4 CAPSULE ORAL at 21:24

## 2022-04-23 NOTE — PROGRESS NOTE ADULT - SUBJECTIVE AND OBJECTIVE BOX
Name of Patient : FAEY DIAZ  MRN: [id number]  Date of visit: 04-23-22       Subjective: Patient seen and examined. No new events except as noted.   Doing okay   cont to be orthostatic     REVIEW OF SYSTEMS:    CONSTITUTIONAL: No weakness, fevers or chills  EYES/ENT: No visual changes;  No vertigo or throat pain   NECK: No pain or stiffness  RESPIRATORY: No cough, wheezing, hemoptysis; No shortness of breath  CARDIOVASCULAR: No chest pain or palpitations  GASTROINTESTINAL: No abdominal or epigastric pain. No nausea, vomiting, or hematemesis; No diarrhea or constipation. No melena or hematochezia.  GENITOURINARY: No dysuria, frequency or hematuria  NEUROLOGICAL: No numbness or weakness  SKIN: No itching, burning, rashes, or lesions   All other review of systems is negative unless indicated above.    MEDICATIONS:  MEDICATIONS  (STANDING):  atorvastatin 20 milliGRAM(s) Oral at bedtime  cloNIDine 0.1 milliGRAM(s) Oral two times a day  heparin   Injectable 5000 Unit(s) SubCutaneous every 8 hours  lactated ringers. 1000 milliLiter(s) (100 mL/Hr) IV Continuous <Continuous>  levothyroxine 25 MICROGram(s) Oral daily  midodrine. 5 milliGRAM(s) Oral three times a day  sodium chloride 0.45%. 1000 milliLiter(s) (75 mL/Hr) IV Continuous <Continuous>  tamsulosin 0.4 milliGRAM(s) Oral at bedtime      PHYSICAL EXAM:  T(C): 36.7 (04-23-22 @ 17:15), Max: 36.8 (04-23-22 @ 01:15)  HR: 79 (04-23-22 @ 17:15) (70 - 95)  BP: 146/89 (04-23-22 @ 17:15) (119/80 - 146/89)  RR: 18 (04-23-22 @ 17:15) (18 - 18)  SpO2: 98% (04-23-22 @ 17:15) (98% - 100%)  Wt(kg): --  I&O's Summary    22 Apr 2022 07:01  -  23 Apr 2022 07:00  --------------------------------------------------------  IN: 200 mL / OUT: 1675 mL / NET: -1475 mL    23 Apr 2022 07:01  -  23 Apr 2022 19:59  --------------------------------------------------------  IN: 175 mL / OUT: 925 mL / NET: -750 mL          Appearance: Normal	  HEENT:  PERRLA   Lymphatic: No lymphadenopathy   Cardiovascular: Normal S1 S2, no JVD  Respiratory: normal effort , clear  Gastrointestinal:  Soft, Non-tender, nephrostomy tube   Skin: No rashes,  warm to touch  Psychiatry:  Mood & affect appropriate  Musculuskeletal: No edema      All labs, Imaging and EKGs personally reviewed       04-22-22 @ 07:01  -  04-23-22 @ 07:00  --------------------------------------------------------  IN: 200 mL / OUT: 1675 mL / NET: -1475 mL    04-23-22 @ 07:01  -  04-23-22 @ 19:59  --------------------------------------------------------  IN: 175 mL / OUT: 925 mL / NET: -750 mL                          9.5    4.41  )-----------( 167      ( 23 Apr 2022 07:10 )             30.0               04-23    142  |  107  |  51<H>  ----------------------------<  75  4.9   |  20<L>  |  5.58<H>    Ca    8.2<L>      23 Apr 2022 07:10  Phos  4.0     04-23  Mg     1.80     04-23

## 2022-04-23 NOTE — PROGRESS NOTE ADULT - ASSESSMENT
Orthostatic hypotension   likely multifactorial   from acute illness, RUTH, resp failure   deconditioned state   hydration   monitor orthostatic vitals  needs aggressive PT   will add midodrine and clonidine     HTN  as above     RUTH  as per renal     Diastolic CHF  mild   grade I   stable

## 2022-04-23 NOTE — PROGRESS NOTE ADULT - SUBJECTIVE AND OBJECTIVE BOX
DATE OF SERVICE: 04-23-22 @ 10:37    Subjective: Patient seen and examined. No new events except as noted.     SUBJECTIVE/ROS:  nad       MEDICATIONS:  MEDICATIONS  (STANDING):  atorvastatin 20 milliGRAM(s) Oral at bedtime  heparin   Injectable 5000 Unit(s) SubCutaneous every 8 hours  lactated ringers. 1000 milliLiter(s) (100 mL/Hr) IV Continuous <Continuous>  levothyroxine 25 MICROGram(s) Oral daily  sodium chloride 0.9%. 1000 milliLiter(s) (100 mL/Hr) IV Continuous <Continuous>  tamsulosin 0.4 milliGRAM(s) Oral at bedtime      PHYSICAL EXAM:  T(C): 36.7 (04-23-22 @ 09:15), Max: 36.8 (04-23-22 @ 01:15)  HR: 82 (04-23-22 @ 09:15) (70 - 98)  BP: 141/87 (04-23-22 @ 09:15) (117/72 - 146/61)  RR: 18 (04-23-22 @ 09:15) (16 - 18)  SpO2: 100% (04-23-22 @ 09:15) (100% - 100%)  Wt(kg): --  I&O's Summary    22 Apr 2022 07:01  -  23 Apr 2022 07:00  --------------------------------------------------------  IN: 200 mL / OUT: 1675 mL / NET: -1475 mL    23 Apr 2022 07:01  -  23 Apr 2022 10:37  --------------------------------------------------------  IN: 0 mL / OUT: 250 mL / NET: -250 mL            JVP: Normal  Neck: supple  Lung: clear   CV: S1 S2 , Murmur:  Abd: soft  Ext: No edema  neuro: Awake / alert  Psych: flat affect  Skin: normal``    LABS/DATA:    CARDIAC MARKERS:                                9.5    4.41  )-----------( 167      ( 23 Apr 2022 07:10 )             30.0     04-23    142  |  107  |  51<H>  ----------------------------<  75  4.9   |  20<L>  |  5.58<H>    Ca    8.2<L>      23 Apr 2022 07:10  Phos  4.0     04-23  Mg     1.80     04-23      proBNP:   Lipid Profile:   HgA1c:   TSH:     TELE:  EKG:

## 2022-04-23 NOTE — PROGRESS NOTE ADULT - ASSESSMENT
72 yo M hx of HTN, HLD, CKD, BPH, R eye blindness, lymphoma, vertigo, hypothyroidism, b/l ureteral stents (placed 15 years ago), presenting to ED from urologist office with worsen L hydro and renal function s/p urgent nephrostomy tube placement by IR.  nephrology consulted for worsen renal failure.    Renal failure  RUTH likely sec to obstruction, now s/p urgent nephrostomy tube placement  renal function improving  unclear baseline, in 07/21 Scr 2.5  continue IVF- NS @ 100cc/hr. monitor urine output and fluid status  Monitor I/O  f/u /IR  avoid nephrotoxic agents  monitor bmp.  pt will need further nephrology follow up. Patient advised to follow up with Dr. dewitt in 1-2 weeks after discharge    HTN  bp optimal   monitor    proteinuria  100 protein in urine  check spot urine p/c ratio  monitor    hypocalcemia  check vit d 25, PTH  monitor    anemia   not iron deficiency   monitor   70 yo M hx of HTN, HLD, CKD, BPH, R eye blindness, lymphoma, vertigo, hypothyroidism, b/l ureteral stents (placed 15 years ago), presenting to ED from urologist office with worsen L hydro and renal function s/p urgent nephrostomy tube placement by IR.  nephrology consulted for worsen renal failure.    Renal failure  RUTH likely sec to obstruction, now s/p urgent nephrostomy tube placement  renal function improving  unclear baseline, in 07/21 Scr 2.5  Change IVF- NS @ 75cc/hr. monitor urine output and fluid status  Monitor I/O  f/u /IR  avoid nephrotoxic agents  monitor bmp.  pt will need further nephrology follow up. Patient advised to follow up with Dr. dewitt in 1-2 weeks after discharge    HTN  bp optimal   monitor    proteinuria  100 protein in urine  check spot urine p/c ratio  monitor    hypocalcemia  check vit d 25, PTH  monitor    anemia   not iron deficiency   monitor   72 yo M hx of HTN, HLD, CKD, BPH, R eye blindness, lymphoma, vertigo, hypothyroidism, b/l ureteral stents (placed 15 years ago), presenting to ED from urologist office with worsen L hydro and renal function s/p urgent nephrostomy tube placement by IR.  nephrology consulted for worsen renal failure.    Renal failure  RUTH likely sec to obstruction, now s/p urgent nephrostomy tube placement  renal function improving  unclear baseline, in 07/21 Scr 2.5  Change IVF-1/2 NS @ 75cc/hr. monitor urine output and fluid status  Monitor I/O  f/u /IR  avoid nephrotoxic agents  monitor bmp.  pt will need further nephrology follow up. Patient advised to follow up with Dr. dewitt in 1-2 weeks after discharge    HTN  bp optimal   orthostatic-follow up cardiology   monitor    proteinuria  100 protein in urine  check spot urine p/c ratio  monitor    hypocalcemia  check vit d 25, PTH  monitor    anemia   not iron deficiency   monitor

## 2022-04-23 NOTE — PROGRESS NOTE ADULT - SUBJECTIVE AND OBJECTIVE BOX
[facility] NEPHROLOG[facility]ACT[facility]   MD KWASI SUH MD, PA INJADRIANNE LUPE PEACOCK    TEL:  OFFICE: [phone]    From 5pm-7am Answering Service [phone]    -- RENAL FOLLOW UP NOTE ---Date of Service 04-23-22 @ 12:25    Patient is a 71y old  Male who presents with a chief complaint of Hypoxic respiratory failure s/p sedation (23 Apr 2022 10:37)      Patient seen and examined at bedside. No chest pain/sob    VITALS:  T(F): 98 (04-23-22 @ 09:15), Max: 98.3 (04-23-22 @ 01:15)  HR: 82 (04-23-22 @ 09:15)  BP: 141/87 (04-23-22 @ 09:15)  RR: 18 (04-23-22 @ 09:15)  SpO2: 100% (04-23-22 @ 09:15)  Wt(kg): --    04-22 @ 07:01  -  04-23 @ 07:00  --------------------------------------------------------  IN: 200 mL / OUT: 1675 mL / NET: -1475 mL    04-23 @ 07:01  -  04-23 @ 12:25  --------------------------------------------------------  IN: 0 mL / OUT: 250 mL / NET: -250 mL          PHYSICAL EXAM:  Constitutional: NAD  Neck: No JVD  Respiratory: CTAB, no wheezes, rales or rhonchi  Cardiovascular: S1, S2, RRR  Gastrointestinal: BS+, soft, NT/ND  Extremities: No peripheral edema    Hospital Medications:   MEDICATIONS  (STANDING):  atorvastatin 20 milliGRAM(s) Oral at bedtime  cloNIDine 0.1 milliGRAM(s) Oral two times a day  heparin   Injectable 5000 Unit(s) SubCutaneous every 8 hours  lactated ringers. 1000 milliLiter(s) (100 mL/Hr) IV Continuous <Continuous>  levothyroxine 25 MICROGram(s) Oral daily  midodrine. 5 milliGRAM(s) Oral three times a day  sodium chloride 0.9%. 1000 milliLiter(s) (100 mL/Hr) IV Continuous <Continuous>  tamsulosin 0.4 milliGRAM(s) Oral at bedtime      LABS:  04-23    142  |  107  |  51<H>  ----------------------------<  75  4.9   |  20<L>  |  5.58<H>    Ca    8.2<L>      23 Apr 2022 07:10  Phos  4.0     04-23  Mg     1.80     04-23      Creatinine Trend: 5.58 <--, 6.03 <--, 6.66 <--, 6.75 <--, 6.96 <--    Phosphorus Level, Serum: 4.0 mg/dL (04-23 @ 07:10)                              9.5    4.41  )-----------( 167      ( 23 Apr 2022 07:10 )             30.0     Urine Studies:  Urinalysis - [04-20-22 @ 14:37]      Color Light Yellow / Appearance Clear / SG 1.018 / pH 6.5      Gluc Negative / Ketone Negative  / Bili Negative / Urobili <2 mg/dL       Blood Small / Protein 100 mg/dL / Leuk Est Small / Nitrite Negative      RBC  / WBC 1 / Hyaline  / Gran  / Sq Epi  / Non Sq Epi 1 / Bacteria Negative      Iron 62, TIBC 226, %sat 27      [04-20-22 @ 21:34]  Ferritin 293      [04-20-22 @ 21:34]    HCV 0.21, Nonreact      [04-21-22 @ 09:26]      RADIOLOGY & ADDITIONAL STUDIES:

## 2022-04-24 LAB
ANION GAP SERPL CALC-SCNC: 14 MMOL/L — SIGNIFICANT CHANGE UP (ref 7–14)
BUN SERPL-MCNC: 48 MG/DL — HIGH (ref 7–23)
CALCIUM SERPL-MCNC: 8.1 MG/DL — LOW (ref 8.4–10.5)
CHLORIDE SERPL-SCNC: 107 MMOL/L — SIGNIFICANT CHANGE UP (ref 98–107)
CO2 SERPL-SCNC: 19 MMOL/L — LOW (ref 22–31)
CREAT ?TM UR-MCNC: 76 MG/DL — SIGNIFICANT CHANGE UP
CREAT SERPL-MCNC: 4.91 MG/DL — HIGH (ref 0.5–1.3)
EGFR: 12 ML/MIN/1.73M2 — LOW
GLUCOSE SERPL-MCNC: 84 MG/DL — SIGNIFICANT CHANGE UP (ref 70–99)
HCT VFR BLD CALC: 30.1 % — LOW (ref 39–50)
HGB BLD-MCNC: 9.4 G/DL — LOW (ref 13–17)
MAGNESIUM SERPL-MCNC: 1.8 MG/DL — SIGNIFICANT CHANGE UP (ref 1.6–2.6)
MCHC RBC-ENTMCNC: 29.9 PG — SIGNIFICANT CHANGE UP (ref 27–34)
MCHC RBC-ENTMCNC: 31.2 GM/DL — LOW (ref 32–36)
MCV RBC AUTO: 95.9 FL — SIGNIFICANT CHANGE UP (ref 80–100)
NRBC # BLD: 0 /100 WBCS — SIGNIFICANT CHANGE UP
NRBC # FLD: 0 K/UL — SIGNIFICANT CHANGE UP
PHOSPHATE SERPL-MCNC: 3.7 MG/DL — SIGNIFICANT CHANGE UP (ref 2.5–4.5)
PLATELET # BLD AUTO: 176 K/UL — SIGNIFICANT CHANGE UP (ref 150–400)
POTASSIUM SERPL-MCNC: 4.7 MMOL/L — SIGNIFICANT CHANGE UP (ref 3.5–5.3)
POTASSIUM SERPL-SCNC: 4.7 MMOL/L — SIGNIFICANT CHANGE UP (ref 3.5–5.3)
PROT ?TM UR-MCNC: 30 MG/DL — SIGNIFICANT CHANGE UP
PROT/CREAT UR-RTO: 0.4 RATIO — HIGH (ref 0–0.2)
PTH-INTACT FLD-MCNC: 217 PG/ML — HIGH (ref 15–65)
RBC # BLD: 3.14 M/UL — LOW (ref 4.2–5.8)
RBC # FLD: 15 % — HIGH (ref 10.3–14.5)
SODIUM SERPL-SCNC: 140 MMOL/L — SIGNIFICANT CHANGE UP (ref 135–145)
WBC # BLD: 5.62 K/UL — SIGNIFICANT CHANGE UP (ref 3.8–10.5)
WBC # FLD AUTO: 5.62 K/UL — SIGNIFICANT CHANGE UP (ref 3.8–10.5)

## 2022-04-24 RX ADMIN — HEPARIN SODIUM 5000 UNIT(S): 5000 INJECTION INTRAVENOUS; SUBCUTANEOUS at 05:39

## 2022-04-24 RX ADMIN — MIDODRINE HYDROCHLORIDE 5 MILLIGRAM(S): 2.5 TABLET ORAL at 13:10

## 2022-04-24 RX ADMIN — SODIUM CHLORIDE 75 MILLILITER(S): 9 INJECTION, SOLUTION INTRAVENOUS at 05:39

## 2022-04-24 RX ADMIN — MIDODRINE HYDROCHLORIDE 5 MILLIGRAM(S): 2.5 TABLET ORAL at 21:23

## 2022-04-24 RX ADMIN — MIDODRINE HYDROCHLORIDE 5 MILLIGRAM(S): 2.5 TABLET ORAL at 05:39

## 2022-04-24 RX ADMIN — ATORVASTATIN CALCIUM 20 MILLIGRAM(S): 80 TABLET, FILM COATED ORAL at 21:23

## 2022-04-24 RX ADMIN — HEPARIN SODIUM 5000 UNIT(S): 5000 INJECTION INTRAVENOUS; SUBCUTANEOUS at 21:23

## 2022-04-24 RX ADMIN — Medication 25 MICROGRAM(S): at 05:40

## 2022-04-24 RX ADMIN — TAMSULOSIN HYDROCHLORIDE 0.4 MILLIGRAM(S): 0.4 CAPSULE ORAL at 21:23

## 2022-04-24 RX ADMIN — Medication 0.1 MILLIGRAM(S): at 05:40

## 2022-04-24 RX ADMIN — HEPARIN SODIUM 5000 UNIT(S): 5000 INJECTION INTRAVENOUS; SUBCUTANEOUS at 13:10

## 2022-04-24 NOTE — PROGRESS NOTE ADULT - SUBJECTIVE AND OBJECTIVE BOX
[facility] [place] [facility]   MD KWASI SUH MD RUORU WONG, [place]    TEL:  OFFICE: [phone]      RENAL FOLLOW UP NOTE-- Date of Service ;; 04-24-22 @ 12:16  --------------------------------------------------------------------------------  HPI:  Pt seen and examined at bedside  Denies SOB, chest pain.         PAST HISTORY  --------------------------------------------------------------------------------  No significant changes to PMH, PSH, FHx, SHx, unless otherwise noted    ALLERGIES & MEDICATIONS  --------------------------------------------------------------------------------  Allergies    No Known Allergies    Intolerances      Standing Inpatient Medications  atorvastatin 20 milliGRAM(s) Oral at bedtime  cloNIDine 0.1 milliGRAM(s) Oral two times a day  heparin   Injectable 5000 Unit(s) SubCutaneous every 8 hours  levothyroxine 25 MICROGram(s) Oral daily  midodrine. 5 milliGRAM(s) Oral three times a day  sodium chloride 0.45%. 1000 milliLiter(s) IV Continuous <Continuous>  tamsulosin 0.4 milliGRAM(s) Oral at bedtime    PRN Inpatient Medications      REVIEW OF SYSTEMS  --------------------------------------------------------------------------------  General: no fever  CVS: no chest pain  RESP: no sob, no cough  ABD: no abdominal pain  : no dysuria,  MSK: no edema     VITALS/PHYSICAL EXAM  --------------------------------------------------------------------------------  T(C): 36.7 (04-24-22 @ 09:15), Max: 36.8 (04-24-22 @ 05:15)  HR: 78 (04-24-22 @ 09:15) (70 - 95)  BP: 138/80 (04-24-22 @ 09:15) (119/80 - 150/87)  RR: 18 (04-24-22 @ 09:15) (15 - 18)  SpO2: 100% (04-24-22 @ 09:15) (98% - 100%)  Wt(kg): --        04-23-22 @ 07:01  -  04-24-22 @ 07:00  --------------------------------------------------------  IN: 475 mL / OUT: 2625 mL / NET: -2150 mL    04-24-22 @ 07:01  -  04-24-22 @ 12:16  --------------------------------------------------------  IN: 0 mL / OUT: 500 mL / NET: -500 mL      Physical Exam:  	Gen: NAD  	HEENT: MMM  	Pulm: CTA B/L  	CV: S1S2  	Abd: Soft, +BS  	Ext: No LE edema B/L                      Neuro: Awake , alert  	Skin: Warm and Dry   	Vascular access:              LABS/STUDIES  --------------------------------------------------------------------------------              9.4    5.62  >-----------<  176      [04-24-22 @ 07:54]              30.1     140  |  107  |  48  ----------------------------<  84      [04-24-22 @ 07:54]  4.7   |  19  |  4.91        Ca     8.1     [04-24-22 @ 07:54]      Mg     1.80     [04-24-22 @ 07:54]      Phos  3.7     [04-24-22 @ 07:54]            Creatinine Trend:  SCr 4.91 [04-24 @ 07:54]  SCr 5.58 [04-23 @ 07:10]  SCr 6.03 [04-22 @ 08:07]  SCr 6.66 [04-21 @ 07:23]  SCr 6.75 [04-20 @ 21:34]    Urinalysis - [04-20-22 @ 14:37]      Color Light Yellow / Appearance Clear / SG 1.018 / pH 6.5      Gluc Negative / Ketone Negative  / Bili Negative / Urobili <2 mg/dL       Blood Small / Protein 100 mg/dL / Leuk Est Small / Nitrite Negative      RBC  / WBC 1 / Hyaline  / Gran  / Sq Epi  / Non Sq Epi 1 / Bacteria Negative      Iron 62, TIBC 226, %sat 27      [04-20-22 @ 21:34]  Ferritin 293      [04-20-22 @ 21:34]    HCV 0.21, Nonreact      [04-21-22 @ 09:26]

## 2022-04-24 NOTE — PROGRESS NOTE ADULT - ASSESSMENT
Orthostatic hypotension   likely multifactorial   from acute illness, RUTH, resp failure   deconditioned state   hydration   monitor orthostatic vitals  needs aggressive PT   cont midodrine and clonidine     HTN  as above     RUTH  as per renal     Diastolic CHF  mild   grade I   stable

## 2022-04-24 NOTE — PROGRESS NOTE ADULT - ASSESSMENT
72 yo M hx of HTN, HLD, CKD, BPH, R eye blindness, lymphoma, vertigo, hypothyroidism, b/l ureteral stents (placed 15 years ago), presenting to ED from urologist office with worsen L hydro and renal function s/p urgent nephrostomy tube placement by IR.  nephrology consulted for worsen renal failure.    Renal failure  RUTH likely sec to obstruction, now s/p urgent nephrostomy tube placement  renal function is improving  unclear baseline, in 07/21 Scr 2.5  Continue IVF-1/2 NS @ 75cc/hr. monitor urine output and fluid status  Monitor I/O  f/u /IR  avoid nephrotoxic agents  monitor bmp.  pt will need further nephrology follow up. Patient advised to follow up with Dr. dewitt in 1-2 weeks after discharge    HTN  bp optimal   orthostatic-follow up cardiology   monitor    proteinuria  100 protein in urine  check spot urine p/c ratio  monitor    hypocalcemia  check vit d 25, PTH  monitor    anemia   not iron deficiency   monitor

## 2022-04-24 NOTE — PROGRESS NOTE ADULT - SUBJECTIVE AND OBJECTIVE BOX
DATE OF SERVICE: 04-24-22 @ 08:33    Subjective: Patient seen and examined. No new events except as noted.     SUBJECTIVE/ROS:  nad      MEDICATIONS:  MEDICATIONS  (STANDING):  atorvastatin 20 milliGRAM(s) Oral at bedtime  cloNIDine 0.1 milliGRAM(s) Oral two times a day  heparin   Injectable 5000 Unit(s) SubCutaneous every 8 hours  levothyroxine 25 MICROGram(s) Oral daily  midodrine. 5 milliGRAM(s) Oral three times a day  sodium chloride 0.45%. 1000 milliLiter(s) (75 mL/Hr) IV Continuous <Continuous>  tamsulosin 0.4 milliGRAM(s) Oral at bedtime      PHYSICAL EXAM:  T(C): 36.8 (04-24-22 @ 05:15), Max: 36.8 (04-24-22 @ 05:15)  HR: 82 (04-24-22 @ 05:15) (70 - 95)  BP: 150/87 (04-24-22 @ 05:15) (119/80 - 150/87)  RR: 17 (04-24-22 @ 05:15) (15 - 18)  SpO2: 100% (04-24-22 @ 05:15) (98% - 100%)  Wt(kg): --  I&O's Summary    23 Apr 2022 07:01  -  24 Apr 2022 07:00  --------------------------------------------------------  IN: 475 mL / OUT: 2625 mL / NET: -2150 mL    24 Apr 2022 07:01  -  24 Apr 2022 08:33  --------------------------------------------------------  IN: 0 mL / OUT: 500 mL / NET: -500 mL            JVP: Normal  Neck: supple  Lung: clear   CV: S1 S2 , Murmur:  Abd: soft  Ext: No edema  neuro: Awake / alert  Psych: flat affect  Skin: normal``    LABS/DATA:    CARDIAC MARKERS:                                9.4    5.62  )-----------( 176      ( 24 Apr 2022 07:54 )             30.1     04-23    142  |  107  |  51<H>  ----------------------------<  75  4.9   |  20<L>  |  5.58<H>    Ca    8.2<L>      23 Apr 2022 07:10  Phos  4.0     04-23  Mg     1.80     04-23      proBNP:   Lipid Profile:   HgA1c:   TSH:     TELE:  EKG:

## 2022-04-24 NOTE — PROGRESS NOTE ADULT - SUBJECTIVE AND OBJECTIVE BOX
Name of Patient : FAYE DIAZ  MRN: [id number]  Date of visit: 04-24-22 @ 11:27      Subjective: Patient seen and examined. No new events except as noted.   cont to be orthostatic     REVIEW OF SYSTEMS:    CONSTITUTIONAL: No weakness, fevers or chills  EYES/ENT: No visual changes;  No vertigo or throat pain   NECK: No pain or stiffness  RESPIRATORY: No cough, wheezing, hemoptysis; No shortness of breath  CARDIOVASCULAR: No chest pain or palpitations  GASTROINTESTINAL: No abdominal or epigastric pain. No nausea, vomiting, or hematemesis; No diarrhea or constipation. No melena or hematochezia.  GENITOURINARY: No dysuria, frequency or hematuria  NEUROLOGICAL: No numbness or weakness  SKIN: No itching, burning, rashes, or lesions   All other review of systems is negative unless indicated above.    MEDICATIONS:  MEDICATIONS  (STANDING):  atorvastatin 20 milliGRAM(s) Oral at bedtime  cloNIDine 0.1 milliGRAM(s) Oral two times a day  heparin   Injectable 5000 Unit(s) SubCutaneous every 8 hours  levothyroxine 25 MICROGram(s) Oral daily  midodrine. 5 milliGRAM(s) Oral three times a day  sodium chloride 0.45%. 1000 milliLiter(s) (75 mL/Hr) IV Continuous <Continuous>  tamsulosin 0.4 milliGRAM(s) Oral at bedtime      PHYSICAL EXAM:  T(C): 36.7 (04-24-22 @ 09:15), Max: 36.8 (04-24-22 @ 05:15)  HR: 78 (04-24-22 @ 09:15) (70 - 95)  BP: 138/80 (04-24-22 @ 09:15) (119/80 - 150/87)  RR: 18 (04-24-22 @ 09:15) (15 - 18)  SpO2: 100% (04-24-22 @ 09:15) (98% - 100%)  Wt(kg): --  I&O's Summary    23 Apr 2022 07:01  -  24 Apr 2022 07:00  --------------------------------------------------------  IN: 475 mL / OUT: 2625 mL / NET: -2150 mL    24 Apr 2022 07:01  -  24 Apr 2022 11:27  --------------------------------------------------------  IN: 0 mL / OUT: 500 mL / NET: -500 mL          Appearance: Normal	  HEENT:  PERRLA   Lymphatic: No lymphadenopathy   Cardiovascular: Normal S1 S2, no JVD  Respiratory: normal effort , clear  Gastrointestinal:  Soft, Non-tender  Skin: No rashes,  warm to touch  Psychiatry:  Mood & affect appropriate  Musculuskeletal: No edema      All labs, Imaging and EKGs personally reviewed       04-23-22 @ 07:01  -  04-24-22 @ 07:00  --------------------------------------------------------  IN: 475 mL / OUT: 2625 mL / NET: -2150 mL    04-24-22 @ 07:01 - 04-24-22 @ 11:27  --------------------------------------------------------  IN: 0 mL / OUT: 500 mL / NET: -500 mL                          9.4    5.62  )-----------( 176      ( 24 Apr 2022 07:54 )             30.1               04-24    140  |  107  |  48<H>  ----------------------------<  84  4.7   |  19<L>  |  4.91<H>    Ca    8.1<L>      24 Apr 2022 07:54  Phos  3.7     04-24  Mg     1.80     04-24

## 2022-04-25 ENCOUNTER — TRANSCRIPTION ENCOUNTER (OUTPATIENT)
Age: 72
End: 2022-04-25

## 2022-04-25 LAB
24R-OH-CALCIDIOL SERPL-MCNC: 23.8 NG/ML — LOW (ref 30–80)
ANION GAP SERPL CALC-SCNC: 14 MMOL/L — SIGNIFICANT CHANGE UP (ref 7–14)
BUN SERPL-MCNC: 46 MG/DL — HIGH (ref 7–23)
CALCIUM SERPL-MCNC: 8.3 MG/DL — LOW (ref 8.4–10.5)
CHLORIDE SERPL-SCNC: 104 MMOL/L — SIGNIFICANT CHANGE UP (ref 98–107)
CO2 SERPL-SCNC: 21 MMOL/L — LOW (ref 22–31)
CREAT SERPL-MCNC: 4.53 MG/DL — HIGH (ref 0.5–1.3)
EGFR: 13 ML/MIN/1.73M2 — LOW
GLUCOSE SERPL-MCNC: 88 MG/DL — SIGNIFICANT CHANGE UP (ref 70–99)
HCT VFR BLD CALC: 29.1 % — LOW (ref 39–50)
HGB BLD-MCNC: 9.5 G/DL — LOW (ref 13–17)
MAGNESIUM SERPL-MCNC: 1.6 MG/DL — SIGNIFICANT CHANGE UP (ref 1.6–2.6)
MCHC RBC-ENTMCNC: 30.6 PG — SIGNIFICANT CHANGE UP (ref 27–34)
MCHC RBC-ENTMCNC: 32.6 GM/DL — SIGNIFICANT CHANGE UP (ref 32–36)
MCV RBC AUTO: 93.9 FL — SIGNIFICANT CHANGE UP (ref 80–100)
NRBC # BLD: 0 /100 WBCS — SIGNIFICANT CHANGE UP
NRBC # FLD: 0 K/UL — SIGNIFICANT CHANGE UP
PHOSPHATE SERPL-MCNC: 3.7 MG/DL — SIGNIFICANT CHANGE UP (ref 2.5–4.5)
PLATELET # BLD AUTO: 183 K/UL — SIGNIFICANT CHANGE UP (ref 150–400)
POTASSIUM SERPL-MCNC: 4.8 MMOL/L — SIGNIFICANT CHANGE UP (ref 3.5–5.3)
POTASSIUM SERPL-SCNC: 4.8 MMOL/L — SIGNIFICANT CHANGE UP (ref 3.5–5.3)
RBC # BLD: 3.1 M/UL — LOW (ref 4.2–5.8)
RBC # FLD: 14.8 % — HIGH (ref 10.3–14.5)
SODIUM SERPL-SCNC: 139 MMOL/L — SIGNIFICANT CHANGE UP (ref 135–145)
WBC # BLD: 5.1 K/UL — SIGNIFICANT CHANGE UP (ref 3.8–10.5)
WBC # FLD AUTO: 5.1 K/UL — SIGNIFICANT CHANGE UP (ref 3.8–10.5)

## 2022-04-25 RX ORDER — CHOLECALCIFEROL (VITAMIN D3) 125 MCG
2000 CAPSULE ORAL DAILY
Refills: 0 | Status: DISCONTINUED | OUTPATIENT
Start: 2022-04-25 | End: 2022-04-29

## 2022-04-25 RX ORDER — MAGNESIUM SULFATE 500 MG/ML
2 VIAL (ML) INJECTION ONCE
Refills: 0 | Status: COMPLETED | OUTPATIENT
Start: 2022-04-25 | End: 2022-04-25

## 2022-04-25 RX ADMIN — SODIUM CHLORIDE 75 MILLILITER(S): 9 INJECTION, SOLUTION INTRAVENOUS at 22:58

## 2022-04-25 RX ADMIN — HEPARIN SODIUM 5000 UNIT(S): 5000 INJECTION INTRAVENOUS; SUBCUTANEOUS at 05:11

## 2022-04-25 RX ADMIN — MIDODRINE HYDROCHLORIDE 5 MILLIGRAM(S): 2.5 TABLET ORAL at 21:34

## 2022-04-25 RX ADMIN — Medication 0.1 MILLIGRAM(S): at 05:13

## 2022-04-25 RX ADMIN — HEPARIN SODIUM 5000 UNIT(S): 5000 INJECTION INTRAVENOUS; SUBCUTANEOUS at 21:35

## 2022-04-25 RX ADMIN — MIDODRINE HYDROCHLORIDE 5 MILLIGRAM(S): 2.5 TABLET ORAL at 05:11

## 2022-04-25 RX ADMIN — HEPARIN SODIUM 5000 UNIT(S): 5000 INJECTION INTRAVENOUS; SUBCUTANEOUS at 13:21

## 2022-04-25 RX ADMIN — TAMSULOSIN HYDROCHLORIDE 0.4 MILLIGRAM(S): 0.4 CAPSULE ORAL at 21:34

## 2022-04-25 RX ADMIN — Medication 0.1 MILLIGRAM(S): at 18:04

## 2022-04-25 RX ADMIN — Medication 25 GRAM(S): at 13:21

## 2022-04-25 RX ADMIN — MIDODRINE HYDROCHLORIDE 5 MILLIGRAM(S): 2.5 TABLET ORAL at 13:21

## 2022-04-25 RX ADMIN — SODIUM CHLORIDE 75 MILLILITER(S): 9 INJECTION, SOLUTION INTRAVENOUS at 06:39

## 2022-04-25 RX ADMIN — ATORVASTATIN CALCIUM 20 MILLIGRAM(S): 80 TABLET, FILM COATED ORAL at 21:34

## 2022-04-25 RX ADMIN — Medication 25 MICROGRAM(S): at 05:11

## 2022-04-25 NOTE — DISCHARGE NOTE PROVIDER - CARE PROVIDER_API CALL
[facility]  CARDIOVASCULAR DISEASE  [address] [facility], [address]  [place] [address]  Phone: [phone]  Fax: [phone]  Follow Up Time:     Collette Oleary)  Radiology General  [address] [place] [address]  Phone: [phone]  Fax: [phone]  Follow Up Time:     Le Desir)  Urology  [address] [facility] [place] [address]  [place] [address]  Phone: [phone]  Fax: [phone]  Follow Up Time:     Giuseppe Skinner  INTERNAL MEDICINE  [address], [address]nd [place] [address]  Phone: [phone]  Fax: [phone]  Follow Up Time:     Amador Zhao ()  Medicine  [address] [facility], [facility] [address]  [place] [address]  Phone: [phone]  Fax: [phone]  Follow Up Time:

## 2022-04-25 NOTE — DISCHARGE NOTE PROVIDER - NSDCFUADDAPPT_GEN_ALL_CORE_FT
Follow up with PCP within 1-2 weeks of discharge. If you are in need of a general medicine physician and post-discharge medical follow-up for further care/recommendations you may contact the [facility] for an appointment at [phone].     Follow up with urology in 1-2 weeks. You may follow up at [facility] located at [address] [place] [address], [address] [facility], [place] [address]. Call [phone] for an appointment.     Follow up with the interventional radiology within 4-6 weeks of discharge. You may call [phone] for an appointment.     Follow up with cardiology within 1-2 weeks of discharge. If you are in need of a general cardiologist after discharge, you may follow up with Dr. Goodson or contact the [facility] for an appointment at [phone].  Follow up with urology Dr. Desir in 1-2 weeks.     Follow up with cardiology in 1-2 weeks.     Follow up with the interventional radiology within 4-6 weeks of discharge. You may call [phone] for an appointment. Your nephrostomy tube needs to be changed routinely every 3 months

## 2022-04-25 NOTE — DISCHARGE NOTE PROVIDER - NSDCMRMEDTOKEN_GEN_ALL_CORE_FT
atorvastatin 20 mg oral tablet: 1 tab(s) orally once a day  docusate sodium 100 mg oral tablet: 1 tab(s) orally 2 times a day  labetalol 300 mg oral tablet: 1 tab(s) orally 2 times a day  levothyroxine 25 mcg (0.025 mg) oral tablet: 1 tab(s) orally once a day  meclizine 25 mg oral tablet: 1 tab(s) orally 2 times a day  NIFEdipine 60 mg oral tablet, extended release: 1 tab(s) orally once a day  tamsulosin 0.4 mg oral capsule: 1 cap(s) orally once a day   atorvastatin 20 mg oral tablet: 1 tab(s) orally once a day  cholecalciferol oral tablet: 2000 unit(s) orally once a day  cloNIDine 0.2 mg oral tablet: 1 tab(s) orally 2 times a day  docusate sodium 100 mg oral tablet: 1 tab(s) orally 2 times a day  levothyroxine 25 mcg (0.025 mg) oral tablet: 1 tab(s) orally once a day  meclizine 25 mg oral tablet: 1 tab(s) orally 2 times a day  midodrine 10 mg oral tablet: 1 tab(s) orally 3 times a day  sodium bicarbonate 650 mg oral tablet: 1 tab(s) orally 3 times a day  tamsulosin 0.4 mg oral capsule: 1 cap(s) orally once a day

## 2022-04-25 NOTE — DISCHARGE NOTE PROVIDER - PROVIDER TOKENS
PROVIDER:[TOKEN:[[id number]]],PROVIDER:[TOKEN:[[id number]]],PROVIDER:[TOKEN:[[id number]]],PROVIDER:[TOKEN:[[id number]]],PROVIDER:[TOKEN:[[id number]]]

## 2022-04-25 NOTE — PROGRESS NOTE ADULT - ASSESSMENT
Orthostatic hypotension   severe autonomic dysfunction   deconditioned state   monitor orthostatic vitals  needs aggressive PT   cont midodrine and clonidine   this will likely take a while to improve , needs aggressive PT , rehabilitation     HTN  as above     RUTH  as per renal     Diastolic CHF  mild   grade I   stable

## 2022-04-25 NOTE — PROGRESS NOTE ADULT - SUBJECTIVE AND OBJECTIVE BOX
Name of Patient : FAYE DIAZ  MRN: [id number]  Date of visit: 04-25-22 @ 12:25      Subjective: Patient seen and examined. No new events except as noted.   Doing okay   cont to be orthostatic   family at the bedside     REVIEW OF SYSTEMS:    CONSTITUTIONAL: No weakness, fevers or chills  EYES/ENT: No visual changes;  No vertigo or throat pain   NECK: No pain or stiffness  RESPIRATORY: No cough, wheezing, hemoptysis; No shortness of breath  CARDIOVASCULAR: No chest pain or palpitations  GASTROINTESTINAL: No abdominal or epigastric pain. No nausea, vomiting, or hematemesis; No diarrhea or constipation. No melena or hematochezia.  GENITOURINARY: No dysuria, frequency or hematuria  NEUROLOGICAL: No numbness or weakness  SKIN: No itching, burning, rashes, or lesions   All other review of systems is negative unless indicated above.    MEDICATIONS:  MEDICATIONS  (STANDING):  atorvastatin 20 milliGRAM(s) Oral at bedtime  cloNIDine 0.1 milliGRAM(s) Oral two times a day  heparin   Injectable 5000 Unit(s) SubCutaneous every 8 hours  levothyroxine 25 MICROGram(s) Oral daily  magnesium sulfate  IVPB 2 Gram(s) IV Intermittent once  midodrine. 5 milliGRAM(s) Oral three times a day  sodium chloride 0.45%. 1000 milliLiter(s) (75 mL/Hr) IV Continuous <Continuous>  tamsulosin 0.4 milliGRAM(s) Oral at bedtime      PHYSICAL EXAM:  T(C): 36.7 (04-25-22 @ 09:13), Max: 36.7 (04-24-22 @ 13:00)  HR: 78 (04-25-22 @ 09:13) (68 - 78)  BP: 110/70 (04-25-22 @ 09:13) (110/70 - 189/94)  RR: 16 (04-25-22 @ 09:13) (16 - 18)  SpO2: 96% (04-25-22 @ 09:13) (96% - 100%)  Wt(kg): --  I&O's Summary    24 Apr 2022 07:01  -  25 Apr 2022 07:00  --------------------------------------------------------  IN: 1300 mL / OUT: 3950 mL / NET: -2650 mL          Appearance: Normal	  HEENT:  PERRLA   Lymphatic: No lymphadenopathy   Cardiovascular: Normal S1 S2, no JVD  Respiratory: normal effort , clear  Gastrointestinal:  Soft, Non-tender  Skin: No rashes,  warm to touch  Psychiatry:  Mood & affect appropriate  Musculuskeletal: No edema      All labs, Imaging and EKGs personally reviewed     04-24-22 @ 07:01  -  04-25-22 @ 07:00  --------------------------------------------------------  IN: 1300 mL / OUT: 3950 mL / NET: -2650 mL                          9.5    5.10  )-----------( 183      ( 25 Apr 2022 07:44 )             29.1               04-25    139  |  104  |  46<H>  ----------------------------<  88  4.8   |  21<L>  |  4.53<H>    Ca    8.3<L>      25 Apr 2022 07:44  Phos  3.7     04-25  Mg     1.60     04-25

## 2022-04-25 NOTE — DISCHARGE NOTE PROVIDER - NSDCCPCAREPLAN_GEN_ALL_CORE_FT
PRINCIPAL DISCHARGE DIAGNOSIS  Diagnosis: Hydronephrosis, left  Assessment and Plan of Treatment: You developed renal failure in the setting of hydronephrosis from a retained ureteral stent. A left nephrostomy tube was placed by interventional radiology. Your kidney function is improving after placement of the nephrostomy tube. You will keep this tube in, flush it with 5mL of normal saline daily and follow up with your urologist within 1 week of discharge. If your tube is not draining any urine for 24 hours, please return to the hospital. Follow up with nephrologist Dr. Skinner in 1-2 weeks to check your kidney function. You will be on sodium bicarbonate tablets until you see Dr. Skinner.      SECONDARY DISCHARGE DIAGNOSES  Diagnosis: Orthostatic hypotension  Assessment and Plan of Treatment: Your blood pressure significantly drops when going from a laying position to a sitting and then a standing position. You become dizzy however you are then able to move around with your rolling walker comfortably. Cardiology recommends taking midodrine as prescribed to help with your blood pressure and symptoms. Wear RALEIGH stockings to help improve your blood pressure with positional changes. Please follow up with cardiologist Dr. Goodson within 1-2 weeks of discharge. For your blood pressure management, you will be on clonidine in addition to the midodrine. STOP nifedpine and labetalol.    Diagnosis: Low vitamin D level  Assessment and Plan of Treatment: Your vitamin D was found to be low. Please take vitamin D supplement daily. Follow up with your PMD to repeat vitamin D levels in 3 months.

## 2022-04-25 NOTE — PROGRESS NOTE ADULT - SUBJECTIVE AND OBJECTIVE BOX
DATE OF SERVICE: 04-25-22 @ 06:59    Subjective: Patient seen and examined. No new events except as noted.     SUBJECTIVE/ROS:        MEDICATIONS:  MEDICATIONS  (STANDING):  atorvastatin 20 milliGRAM(s) Oral at bedtime  cloNIDine 0.1 milliGRAM(s) Oral two times a day  heparin   Injectable 5000 Unit(s) SubCutaneous every 8 hours  levothyroxine 25 MICROGram(s) Oral daily  midodrine. 5 milliGRAM(s) Oral three times a day  sodium chloride 0.45%. 1000 milliLiter(s) (75 mL/Hr) IV Continuous <Continuous>  tamsulosin 0.4 milliGRAM(s) Oral at bedtime      PHYSICAL EXAM:  T(C): 36.7 (04-25-22 @ 05:13), Max: 36.7 (04-24-22 @ 09:15)  HR: 76 (04-25-22 @ 05:13) (68 - 78)  BP: 163/64 (04-25-22 @ 05:13) (120/61 - 189/94)  RR: 18 (04-25-22 @ 05:13) (16 - 18)  SpO2: 100% (04-25-22 @ 05:13) (100% - 100%)  Wt(kg): --  I&O's Summary    23 Apr 2022 07:01  -  24 Apr 2022 07:00  --------------------------------------------------------  IN: 475 mL / OUT: 2625 mL / NET: -2150 mL    24 Apr 2022 07:01  -  25 Apr 2022 06:59  --------------------------------------------------------  IN: 1300 mL / OUT: 3950 mL / NET: -2650 mL            JVP: Normal  Neck: supple  Lung: clear   CV: S1 S2 , Murmur:  Abd: soft  Ext: No edema  neuro: Awake / alert  Psych: flat affect  Skin: normal``    LABS/DATA:    CARDIAC MARKERS:                                9.4    5.62  )-----------( 176      ( 24 Apr 2022 07:54 )             30.1     04-24    140  |  107  |  48<H>  ----------------------------<  84  4.7   |  19<L>  |  4.91<H>    Ca    8.1<L>      24 Apr 2022 07:54  Phos  3.7     04-24  Mg     1.80     04-24      proBNP:   Lipid Profile:   HgA1c:   TSH:     TELE:  EKG:

## 2022-04-25 NOTE — PROGRESS NOTE ADULT - SUBJECTIVE AND OBJECTIVE BOX
[facility]   MD KWASI SUH MD KRISTINE SOLTANPOUR, ISELA HATCH    TEL:  OFFICE: [phone]  From 5pm-7am Answering Service [phone]    -- RENAL FOLLOW UP NOTE ---Date of Service 04-25-22 @ 14:51    Patient is a 71y old  Male who presents with a chief complaint of Hypoxic respiratory failure s/p sedation (25 Apr 2022 13:15)      Patient seen and examined at bedside. No chest pain/sob    VITALS:  T(F): 98.2 (04-25-22 @ 13:13), Max: 98.2 (04-25-22 @ 13:13)  HR: 80 (04-25-22 @ 13:13)  BP: 124/69 (04-25-22 @ 13:13)  RR: 16 (04-25-22 @ 13:13)  SpO2: 100% (04-25-22 @ 13:13)  Wt(kg): --    04-24 @ 07:01  -  04-25 @ 07:00  --------------------------------------------------------  IN: 1300 mL / OUT: 3950 mL / NET: -2650 mL    04-25 @ 07:01  -  04-25 @ 14:51  --------------------------------------------------------  IN: 0 mL / OUT: 930 mL / NET: -930 mL          PHYSICAL EXAM:  Constitutional: NAD  Neck: No JVD  Respiratory: CTAB, no wheezes, rales or rhonchi  Cardiovascular: S1, S2, RRR  Gastrointestinal: BS+, soft, NT/ND  Extremities: No peripheral edema    Hospital Medications:   MEDICATIONS  (STANDING):  atorvastatin 20 milliGRAM(s) Oral at bedtime  cloNIDine 0.1 milliGRAM(s) Oral two times a day  heparin   Injectable 5000 Unit(s) SubCutaneous every 8 hours  levothyroxine 25 MICROGram(s) Oral daily  midodrine. 5 milliGRAM(s) Oral three times a day  sodium chloride 0.45%. 1000 milliLiter(s) (75 mL/Hr) IV Continuous <Continuous>  tamsulosin 0.4 milliGRAM(s) Oral at bedtime      LABS:  04-25    139  |  104  |  46<H>  ----------------------------<  88  4.8   |  21<L>  |  4.53<H>    Ca    8.3<L>      25 Apr 2022 07:44  Phos  3.7     04-25  Mg     1.60     04-25      Creatinine Trend: 4.53 <--, 4.91 <--, 5.58 <--, 6.03 <--, 6.66 <--, 6.75 <--, 6.96 <--    Phosphorus Level, Serum: 3.7 mg/dL (04-25 @ 07:44)  Vitamin D, 25-Hydroxy: 23.8 ng/mL (04-24 @ 23:11)    calcium--  intact [id number]  parathyroid hormone intact, serum--                            9.5    5.10  )-----------( 183      ( 25 Apr 2022 07:44 )             29.1     Urine Studies:  Urinalysis - [04-20-22 @ 14:37]      Color Light Yellow / Appearance Clear / SG 1.018 / pH 6.5      Gluc Negative / Ketone Negative  / Bili Negative / Urobili <2 mg/dL       Blood Small / Protein 100 mg/dL / Leuk Est Small / Nitrite Negative      RBC  / WBC 1 / Hyaline  / Gran  / Sq Epi  / Non Sq Epi 1 / Bacteria Negative    Urine Creatinine 76      [04-24-22 @ 18:13]  Urine Protein 30      [04-24-22 @ 18:13]    Iron 62, TIBC 226, %sat 27      [04-20-22 @ 21:34]  Ferritin 293      [04-20-22 @ 21:34]  PTH -- (Ca --)      [04-24-22 @ 15:58]   217  Vitamin D (25OH) 23.8      [04-24-22 @ 23:11]    HCV 0.21, Nonreact      [04-21-22 @ 09:26]      RADIOLOGY & ADDITIONAL STUDIES:

## 2022-04-25 NOTE — PROGRESS NOTE ADULT - ASSESSMENT
72 yo M hx of HTN, HLD, CKD, BPH, R eye blindness, lymphoma, vertigo, hypothyroidism, b/l ureteral stents (placed 15 years ago), presenting to ED from urologist office with worsen L hydro and renal function s/p urgent nephrostomy tube placement by IR.  nephrology consulted for worsen renal failure.    Renal failure  RUTH likely sec to obstruction, now s/p urgent nephrostomy tube placement  renal function is improving  unclear baseline, in 07/21 Scr 2.5  Continue IVF-1/2 NS @ 75cc/hr. monitor urine output and fluid status  Monitor I/O  f/u /IR  avoid nephrotoxic agents  monitor bmp.  pt will need further nephrology follow up. Patient advised to follow up with Dr. dewitt in 1-2 weeks after discharge    HTN  bp optimal   orthostatic-follow up cardiology   monitor    proteinuria  100 protein in urine  spot urine p/c ratio 0.395  monitor    hypocalcemia  elevated PTH  low vit d 25  start vit d supplement  monitor    anemia   not iron deficiency   monitor

## 2022-04-25 NOTE — DISCHARGE NOTE PROVIDER - HOSPITAL COURSE
72 y/o male with PMHx of HTN, HLD, CKD, lymphoma, vertigo, hypothyroidism, B/L ureteral stents (placed 15 years ago) who presented to the ED from his urologist office for nephrostomy tube placement in sight of renal failure and severe left hydronephrosis. Procedure was done by IR under light sedation without issue but afterwards, patient became hypoxic requiring intubation. Patient extubated and admitted for further workup.     1. RUTH on CKD 2/2 hydronephrosis in setting of retained ureteral stent now s/p L nephrostomy tube on 4/20: Patient with improving renal function after urgent nephrostomy tube placement. Patient treated with IVF during admission. His nephrostomy tube was flushed daily. He will need to continue receiving outpatient care with nephrology team. Patient may follow with Dr. Skinner in 1-2 weeks after discharge. Patient will also need routine 3 month exchanges.Patient advised to call outpatient IR office [phone] to schedule appointment. Patient may also follow with Dr Desir for retained left stent fragment (urology).     2. Orthostatics: Patient's course complicated by profound symptomatic orthostatic blood pressure changes. He was seen by cardiology. Echo with EF 65%, normal LV systolic function, mild diastolic dysfunction. He was started on Midodrine and Clonidine. Cardiology to decide regarding Florinef.     PT - no needs.    70 y/o male with PMHx of HTN, HLD, CKD, lymphoma, vertigo, hypothyroidism, B/L ureteral stents (placed 15 years ago) who presented to the ED from his urologist office for nephrostomy tube placement in sight of renal failure and severe left hydronephrosis. Procedure was done by IR under light sedation without issue but afterwards, patient became hypoxic requiring intubation. Patient extubated and admitted for further workup.     Renal failure   - Secondary to hydronephrosis in setting of retained ureteral stent now s/p L nephrostomy tube on 4/20  - Renal function improving following nephrostomy placement   - Patient may also follow with Dr Desir for retained left stent fragment (urology)   - Home care arranged by case management     Orthostatic hypotension   - Profound orthostatic hypotension  - Started on midodrine 10mg TID and RALEIGH stockings   - Patient educated to be cautious with changing positions. He is able to ambulate with a rolling walker down the verma without symptoms and feels stable to go home   - Per Dr. Goodson, can follow up outpatient for amyloid workup   - TTE with EF 65%, no significant valvular abnormalities or WMA     Patient seen and evaluated. Reviewed discharge medications with patient and attending. All new medications requiring new prescriptions were sent to the pharmacy of patient's choice. Reviewed need for prescription for previous home medications and new prescriptions sent if requested. Medically cleared/stable for discharge as per Dr. Stone on 4/29/22 with appropriate follow up. Patient understands and agrees with plan of care.

## 2022-04-25 NOTE — DISCHARGE NOTE PROVIDER - CARE PROVIDERS DIRECT ADDRESSES
,DirectAddress_Unknown,DirectAddress_Unknown,[email or web address],DirectAddress_Unknown,DirectAddress_Unknown

## 2022-04-26 LAB
ANION GAP SERPL CALC-SCNC: 14 MMOL/L — SIGNIFICANT CHANGE UP (ref 7–14)
BASOPHILS # BLD AUTO: 0.03 K/UL — SIGNIFICANT CHANGE UP (ref 0–0.2)
BASOPHILS NFR BLD AUTO: 0.6 % — SIGNIFICANT CHANGE UP (ref 0–2)
BUN SERPL-MCNC: 47 MG/DL — HIGH (ref 7–23)
CALCIUM SERPL-MCNC: 8.1 MG/DL — LOW (ref 8.4–10.5)
CHLORIDE SERPL-SCNC: 106 MMOL/L — SIGNIFICANT CHANGE UP (ref 98–107)
CO2 SERPL-SCNC: 21 MMOL/L — LOW (ref 22–31)
CREAT SERPL-MCNC: 4.29 MG/DL — HIGH (ref 0.5–1.3)
EGFR: 14 ML/MIN/1.73M2 — LOW
EOSINOPHIL # BLD AUTO: 0.31 K/UL — SIGNIFICANT CHANGE UP (ref 0–0.5)
EOSINOPHIL NFR BLD AUTO: 5.8 % — SIGNIFICANT CHANGE UP (ref 0–6)
GLUCOSE SERPL-MCNC: 89 MG/DL — SIGNIFICANT CHANGE UP (ref 70–99)
HCT VFR BLD CALC: 30.5 % — LOW (ref 39–50)
HGB BLD-MCNC: 9.7 G/DL — LOW (ref 13–17)
IANC: 3.18 K/UL — SIGNIFICANT CHANGE UP (ref 1.8–7.4)
IMM GRANULOCYTES NFR BLD AUTO: 0.4 % — SIGNIFICANT CHANGE UP (ref 0–1.5)
LYMPHOCYTES # BLD AUTO: 1.36 K/UL — SIGNIFICANT CHANGE UP (ref 1–3.3)
LYMPHOCYTES # BLD AUTO: 25.5 % — SIGNIFICANT CHANGE UP (ref 13–44)
MAGNESIUM SERPL-MCNC: 2 MG/DL — SIGNIFICANT CHANGE UP (ref 1.6–2.6)
MCHC RBC-ENTMCNC: 29.9 PG — SIGNIFICANT CHANGE UP (ref 27–34)
MCHC RBC-ENTMCNC: 31.8 GM/DL — LOW (ref 32–36)
MCV RBC AUTO: 94.1 FL — SIGNIFICANT CHANGE UP (ref 80–100)
MONOCYTES # BLD AUTO: 0.44 K/UL — SIGNIFICANT CHANGE UP (ref 0–0.9)
MONOCYTES NFR BLD AUTO: 8.2 % — SIGNIFICANT CHANGE UP (ref 2–14)
NEUTROPHILS # BLD AUTO: 3.18 K/UL — SIGNIFICANT CHANGE UP (ref 1.8–7.4)
NEUTROPHILS NFR BLD AUTO: 59.5 % — SIGNIFICANT CHANGE UP (ref 43–77)
NRBC # BLD: 0 /100 WBCS — SIGNIFICANT CHANGE UP
NRBC # FLD: 0 K/UL — SIGNIFICANT CHANGE UP
PHOSPHATE SERPL-MCNC: 3.8 MG/DL — SIGNIFICANT CHANGE UP (ref 2.5–4.5)
PLATELET # BLD AUTO: 185 K/UL — SIGNIFICANT CHANGE UP (ref 150–400)
POTASSIUM SERPL-MCNC: 5 MMOL/L — SIGNIFICANT CHANGE UP (ref 3.5–5.3)
POTASSIUM SERPL-SCNC: 5 MMOL/L — SIGNIFICANT CHANGE UP (ref 3.5–5.3)
RBC # BLD: 3.24 M/UL — LOW (ref 4.2–5.8)
RBC # FLD: 15 % — HIGH (ref 10.3–14.5)
SODIUM SERPL-SCNC: 141 MMOL/L — SIGNIFICANT CHANGE UP (ref 135–145)
WBC # BLD: 5.34 K/UL — SIGNIFICANT CHANGE UP (ref 3.8–10.5)
WBC # FLD AUTO: 5.34 K/UL — SIGNIFICANT CHANGE UP (ref 3.8–10.5)

## 2022-04-26 RX ORDER — MIDODRINE HYDROCHLORIDE 2.5 MG/1
10 TABLET ORAL THREE TIMES A DAY
Refills: 0 | Status: DISCONTINUED | OUTPATIENT
Start: 2022-04-26 | End: 2022-04-29

## 2022-04-26 RX ADMIN — Medication 0.1 MILLIGRAM(S): at 05:11

## 2022-04-26 RX ADMIN — ATORVASTATIN CALCIUM 20 MILLIGRAM(S): 80 TABLET, FILM COATED ORAL at 22:23

## 2022-04-26 RX ADMIN — HEPARIN SODIUM 5000 UNIT(S): 5000 INJECTION INTRAVENOUS; SUBCUTANEOUS at 13:04

## 2022-04-26 RX ADMIN — TAMSULOSIN HYDROCHLORIDE 0.4 MILLIGRAM(S): 0.4 CAPSULE ORAL at 22:23

## 2022-04-26 RX ADMIN — HEPARIN SODIUM 5000 UNIT(S): 5000 INJECTION INTRAVENOUS; SUBCUTANEOUS at 22:23

## 2022-04-26 RX ADMIN — Medication 2000 UNIT(S): at 13:04

## 2022-04-26 RX ADMIN — MIDODRINE HYDROCHLORIDE 5 MILLIGRAM(S): 2.5 TABLET ORAL at 05:11

## 2022-04-26 RX ADMIN — Medication 0.1 MILLIGRAM(S): at 17:35

## 2022-04-26 RX ADMIN — MIDODRINE HYDROCHLORIDE 10 MILLIGRAM(S): 2.5 TABLET ORAL at 13:03

## 2022-04-26 RX ADMIN — HEPARIN SODIUM 5000 UNIT(S): 5000 INJECTION INTRAVENOUS; SUBCUTANEOUS at 05:11

## 2022-04-26 RX ADMIN — Medication 25 MICROGRAM(S): at 05:11

## 2022-04-26 NOTE — PROGRESS NOTE ADULT - SUBJECTIVE AND OBJECTIVE BOX
Name of Patient : FAYE DIAZ  MRN: [id number]  Date of visit: 04-26-22 @ 15:32      Subjective: Patient seen and examined. No new events except as noted.   Doing okay  cont ot be orthostatics      REVIEW OF SYSTEMS:    CONSTITUTIONAL: No weakness, fevers or chills  EYES/ENT: No visual changes;  No vertigo or throat pain   NECK: No pain or stiffness  RESPIRATORY: No cough, wheezing, hemoptysis; No shortness of breath  CARDIOVASCULAR: No chest pain or palpitations  GASTROINTESTINAL: No abdominal or epigastric pain. No nausea, vomiting, or hematemesis; No diarrhea or constipation. No melena or hematochezia.  GENITOURINARY: No dysuria, frequency or hematuria  NEUROLOGICAL: No numbness or weakness  SKIN: No itching, burning, rashes, or lesions   All other review of systems is negative unless indicated above.    MEDICATIONS:  MEDICATIONS  (STANDING):  atorvastatin 20 milliGRAM(s) Oral at bedtime  cholecalciferol 2000 Unit(s) Oral daily  cloNIDine 0.1 milliGRAM(s) Oral two times a day  heparin   Injectable 5000 Unit(s) SubCutaneous every 8 hours  levothyroxine 25 MICROGram(s) Oral daily  midodrine. 10 milliGRAM(s) Oral three times a day  sodium chloride 0.45%. 1000 milliLiter(s) (75 mL/Hr) IV Continuous <Continuous>  tamsulosin 0.4 milliGRAM(s) Oral at bedtime      PHYSICAL EXAM:  T(C): 36.6 (04-26-22 @ 13:00), Max: 36.9 (04-25-22 @ 17:43)  HR: 74 (04-26-22 @ 13:00) (69 - 81)  BP: 133/77 (04-26-22 @ 13:00) (122/64 - 157/85)  RR: 16 (04-26-22 @ 13:00) (15 - 17)  SpO2: 100% (04-26-22 @ 13:00) (100% - 100%)  Wt(kg): --  I&O's Summary    25 Apr 2022 07:01  -  26 Apr 2022 07:00  --------------------------------------------------------  IN: 1518 mL / OUT: 3530 mL / NET: -2012 mL    26 Apr 2022 07:01  -  26 Apr 2022 15:32  --------------------------------------------------------  IN: 340 mL / OUT: 675 mL / NET: -335 mL          Appearance: Normal	  HEENT:  PERRLA   Lymphatic: No lymphadenopathy   Cardiovascular: Normal S1 S2, no JVD  Respiratory: normal effort , clear  Gastrointestinal:  Soft, Non-tender  Skin: No rashes,  warm to touch  Psychiatry:  Mood & affect appropriate  Musculuskeletal: No edema      All labs, Imaging and EKGs personally reviewed       04-25-22 @ 07:01  -  04-26-22 @ 07:00  --------------------------------------------------------  IN: 1518 mL / OUT: 3530 mL / NET: -2012 mL    04-26-22 @ 07:01  -  04-26-22 @ 15:32  --------------------------------------------------------  IN: 340 mL / OUT: 675 mL / NET: -335 mL                          9.7    5.34  )-----------( 185      ( 26 Apr 2022 08:14 )             30.5               04-26    141  |  106  |  47<H>  ----------------------------<  89  5.0   |  21<L>  |  4.29<H>    Ca    8.1<L>      26 Apr 2022 08:14  Phos  3.8     04-26  Mg     2.00     04-26

## 2022-04-26 NOTE — CHART NOTE - NSCHARTNOTESELECT_GEN_ALL_CORE
Event Note
Event Note
Nephrostomy Tube Flush/Event Note
PRE IR/Event Note
Event Note
Nephrostomy Tube/Event Note

## 2022-04-26 NOTE — CHART NOTE - NSCHARTNOTEFT_GEN_A_CORE
Flushed right nephrostomy tube with 5cc of NS. No complications, flushing well and without resistance. No pain.
Nephrostomy tube flushed with 5cc of saline, no complications.
Nephrostomy tube was flushed with 5cc, no resistance met. Tolerated well.
PRE-INTERVENTIONAL RADIOLOGY PROCEDURE NOTE      Patient Age: 71    Patient Gender: male    Procedure: placement of L nephrostomy tube    Diagnosis/Indication:  Imaging showed patient to have left sided hydronephrosis.      Interventional Radiology Attending Physician: Dr. Gonzalez    Ordering Attending Physician: Dr. Ku    Pertinent Medical History: 71y Male with DM, CKD, and lymphoma and bilateral retained ureteral stents (initially placed during lymphoma treatment about 15 years ago).  Right sided stent was successfully changed, but left sided stent broke and there is a retained fragment. At one point, patient also had a Left nephrostomy tube that has since been removed.  Patient had presented to urology outpatient for removal of the remaining stents.      Pertinent labs:                      9.2    4.78  )-----------( 194      ( 20 Apr 2022 13:20 )             28.7       04-20    141  |  107  |  59<H>  ----------------------------<  96  5.0   |  20<L>  |  6.96<H>    Ca    8.3<L>      20 Apr 2022 13:20    TPro  6.5  /  Alb  4.2  /  TBili  0.2  /  DBili  x   /  AST  11  /  ALT  11  /  AlkPhos  111  04-20      PT/INR - ( 20 Apr 2022 13:20 )   PT: 11.6 sec;   INR: 1.00 ratio         PTT - ( 20 Apr 2022 13:20 )  PTT:35.9 sec        Patient and Family Aware ? Yes
Patient s/p left PCN placement on 4/20/22. Seen at bedside, no complaints. Nephrostomy tube draining very light red (pink colored). Drain was flushed 5cc, no resistance met. Tolerated well.
Pt taken to IR for placement of L nephrostomy tube under light sedation (fentanyl and propofol).  Case was uneventful, but at the conclusion, pt was noted to desaturate as low as 50s/60s, was quickly moved into the supine position and then intubated.  Pt never was pulseless.  Upon intubation, O2 saturation immediately improved.  Per anesthesia, it is believed the patient had a delayed response to the anesthetics.  Pt was monitored in the IR suite, and was successfully extubated.      L NT in place and draining clear urine.  Ucx sent for micro examination and culture.      All the above discussed w/ the MAR.

## 2022-04-26 NOTE — PROGRESS NOTE ADULT - ASSESSMENT
72 yo M hx of HTN, HLD, CKD, BPH, R eye blindness, lymphoma, vertigo, hypothyroidism, b/l ureteral stents (placed 15 years ago), presenting to ED from urologist office with worsen L hydro and renal function s/p urgent nephrostomy tube placement by IR.  nephrology consulted for worsen renal failure.    Renal failure  RUTH likely sec to obstruction, now s/p urgent nephrostomy tube placement  renal function is improving  unclear baseline, in 07/21 Scr 2.5  Continue IVF-1/2 NS @ 75cc/hr. monitor urine output and fluid status  Monitor I/O  f/u /IR  avoid nephrotoxic agents  monitor bmp.  pt will need further nephrology follow up. Patient advised to follow up with Dr. dewitt in 1-2 weeks after discharge    HTN  bp optimal   orthostatic-follow up cardiology   on clonidine and midodrine   monitor    proteinuria  100 protein in urine  spot urine p/c ratio 0.395  monitor    hypocalcemia  elevated PTH  low vit d 25  start vit d supplement  monitor    anemia   not iron deficiency   monitor   70 yo M hx of HTN, HLD, CKD, BPH, R eye blindness, lymphoma, vertigo, hypothyroidism, b/l ureteral stents (placed 15 years ago), presenting to ED from urologist office with worsen L hydro and renal function s/p urgent nephrostomy tube placement by IR.  nephrology consulted for worsen renal failure.    Renal failure  RUTH likely sec to obstruction, now s/p urgent nephrostomy tube placement  renal function is improving  Has underlying CKD stage 4  unclear baseline, in 07/21 Scr 2.5  Continue IVF-1/2 NS @ 75cc/hr. monitor urine output and fluid status  Monitor I/O  f/u /IR  avoid nephrotoxic agents  monitor bmp.  pt will need further nephrology follow up. Patient advised to follow up with Dr. dewitt in 1-2 weeks after discharge    HTN  bp optimal   orthostatic-follow up cardiology   on clonidine and midodrine   monitor    proteinuria  100 protein in urine  spot urine p/c ratio 0.395  monitor    hypocalcemia  elevated PTH  low vit d 25  start vit d supplement  monitor    anemia   not iron deficiency   monitor

## 2022-04-26 NOTE — PROGRESS NOTE ADULT - ASSESSMENT
Patient is a 72 Y/O Wilhelm with PMHx of HTN, HLD, CKD, lymphoma, vertigo, hypothyroidism, B/L ureteral stents (placed 15 years ago), presenting to ED from urologist office for nephrostomy tube placement iso renal failure with severe L hydronephrosis. Procedure was done by IR under light sedation without issue but afterwards, patient became hypoxic requiring intubation. Patient now extubated and admitted for further monitoring.

## 2022-04-26 NOTE — PROGRESS NOTE ADULT - ASSESSMENT
Orthostatic hypotension   severe autonomic dysfunction   deconditioned state   monitor orthostatic vitals  needs aggressive PT   cont midodrine and clonidine   increase dose of midodrine to 10 tid   this will likely take a while to improve , needs aggressive PT , rehabilitation     HTN  as above     RUTH  as per renal     Diastolic CHF  mild   grade I   stable

## 2022-04-26 NOTE — PROGRESS NOTE ADULT - SUBJECTIVE AND OBJECTIVE BOX
[facility] [place] [facility]   MD KWASI SUH MD KRISTINE SOLTANPOUR, ISELA ANTONY    TEL:  OFFICE: [phone]  From 5pm-7am Answering Service [phone]    -- RENAL FOLLOW UP NOTE ---Date of Service 04-26-22 @ 12:45    Patient is a 71y old  Male who presents with a chief complaint of Hypoxic respiratory failure s/p sedation (26 Apr 2022 07:11)      Patient seen and examined at bedside. No chest pain/sob    VITALS:  T(F): 98.1 (04-26-22 @ 09:11), Max: 98.5 (04-25-22 @ 17:43)  HR: 69 (04-26-22 @ 09:11)  BP: 122/64 (04-26-22 @ 09:11)  RR: 16 (04-26-22 @ 09:11)  SpO2: 100% (04-26-22 @ 09:11)  Wt(kg): --    04-25 @ 07:01  -  04-26 @ 07:00  --------------------------------------------------------  IN: 1518 mL / OUT: 3530 mL / NET: -2012 mL    04-26 @ 07:01  -  04-26 @ 12:45  --------------------------------------------------------  IN: 240 mL / OUT: 500 mL / NET: -260 mL          PHYSICAL EXAM:  Constitutional: NAD  Neck: No JVD  Respiratory: CTAB, no wheezes, rales or rhonchi  Cardiovascular: S1, S2, RRR  Gastrointestinal: BS+, soft, NT/ND  Extremities: No peripheral edema    Hospital Medications:   MEDICATIONS  (STANDING):  atorvastatin 20 milliGRAM(s) Oral at bedtime  cholecalciferol 2000 Unit(s) Oral daily  cloNIDine 0.1 milliGRAM(s) Oral two times a day  heparin   Injectable 5000 Unit(s) SubCutaneous every 8 hours  levothyroxine 25 MICROGram(s) Oral daily  midodrine. 10 milliGRAM(s) Oral three times a day  sodium chloride 0.45%. 1000 milliLiter(s) (75 mL/Hr) IV Continuous <Continuous>  tamsulosin 0.4 milliGRAM(s) Oral at bedtime      LABS:  04-26    141  |  106  |  47<H>  ----------------------------<  89  5.0   |  21<L>  |  4.29<H>    Ca    8.1<L>      26 Apr 2022 08:14  Phos  3.8     04-26  Mg     2.00     04-26      Creatinine Trend: 4.29 <--, 4.53 <--, 4.91 <--, 5.58 <--, 6.03 <--, 6.66 <--, 6.75 <--, 6.96 <--    Phosphorus Level, Serum: 3.8 mg/dL (04-26 @ 08:14)                              9.7    5.34  )-----------( 185      ( 26 Apr 2022 08:14 )             30.5     Urine Studies:  Urinalysis - [04-20-22 @ 14:37]      Color Light Yellow / Appearance Clear / SG 1.018 / pH 6.5      Gluc Negative / Ketone Negative  / Bili Negative / Urobili <2 mg/dL       Blood Small / Protein 100 mg/dL / Leuk Est Small / Nitrite Negative      RBC  / WBC 1 / Hyaline  / Gran  / Sq Epi  / Non Sq Epi 1 / Bacteria Negative    Urine Creatinine 76      [04-24-22 @ 18:13]  Urine Protein 30      [04-24-22 @ 18:13]    Iron 62, TIBC 226, %sat 27      [04-20-22 @ 21:34]  Ferritin 293      [04-20-22 @ 21:34]  PTH -- (Ca --)      [04-24-22 @ 15:58]   217  Vitamin D (25OH) 23.8      [04-24-22 @ 23:11]    HCV 0.21, Nonreact      [04-21-22 @ 09:26]      RADIOLOGY & ADDITIONAL STUDIES:   [facility] [place] [facility]   MD KWASI SUH MD KRISTINE SOLTANPOUR, ISELA ANTONY    TEL:  OFFICE: [phone]  From 5pm-7am Answering Service [phone]    -- RENAL FOLLOW UP NOTE ---Date of Service 04-26-22 @ 12:45    Patient is a 71y old  Male who presents with a chief complaint of Hypoxic respiratory failure s/p sedation (26 Apr 2022 07:11)      Patient seen and examined at bedside. No chest pain/sob.    ROS: All 10 point ROS was obtained and denied.     VITALS:  T(F): 98.1 (04-26-22 @ 09:11), Max: 98.5 (04-25-22 @ 17:43)  HR: 69 (04-26-22 @ 09:11)  BP: 122/64 (04-26-22 @ 09:11)  RR: 16 (04-26-22 @ 09:11)  SpO2: 100% (04-26-22 @ 09:11)  Wt(kg): --    04-25 @ 07:01  -  04-26 @ 07:00  --------------------------------------------------------  IN: 1518 mL / OUT: 3530 mL / NET: -2012 mL    04-26 @ 07:01  -  04-26 @ 12:45  --------------------------------------------------------  IN: 240 mL / OUT: 500 mL / NET: -260 mL          PHYSICAL EXAM:  Constitutional: NAD  Neck: No JVD  Respiratory: CTAB, no wheezes, rales or rhonchi  Cardiovascular: S1, S2, RRR  Gastrointestinal: BS+, soft, NT/ND  Extremities: No peripheral edema  : Left nephrostomy tube with clear urine.     Hospital Medications:   MEDICATIONS  (STANDING):  atorvastatin 20 milliGRAM(s) Oral at bedtime  cholecalciferol 2000 Unit(s) Oral daily  cloNIDine 0.1 milliGRAM(s) Oral two times a day  heparin   Injectable 5000 Unit(s) SubCutaneous every 8 hours  levothyroxine 25 MICROGram(s) Oral daily  midodrine. 10 milliGRAM(s) Oral three times a day  sodium chloride 0.45%. 1000 milliLiter(s) (75 mL/Hr) IV Continuous <Continuous>  tamsulosin 0.4 milliGRAM(s) Oral at bedtime      LABS:  04-26    141  |  106  |  47<H>  ----------------------------<  89  5.0   |  21<L>  |  4.29<H>    Ca    8.1<L>      26 Apr 2022 08:14  Phos  3.8     04-26  Mg     2.00     04-26      Creatinine Trend: 4.29 <--, 4.53 <--, 4.91 <--, 5.58 <--, 6.03 <--, 6.66 <--, 6.75 <--, 6.96 <--    Phosphorus Level, Serum: 3.8 mg/dL (04-26 @ 08:14)                              9.7    5.34  )-----------( 185      ( 26 Apr 2022 08:14 )             30.5     Urine Studies:  Urinalysis - [04-20-22 @ 14:37]      Color Light Yellow / Appearance Clear / SG 1.018 / pH 6.5      Gluc Negative / Ketone Negative  / Bili Negative / Urobili <2 mg/dL       Blood Small / Protein 100 mg/dL / Leuk Est Small / Nitrite Negative      RBC  / WBC 1 / Hyaline  / Gran  / Sq Epi  / Non Sq Epi 1 / Bacteria Negative    Urine Creatinine 76      [04-24-22 @ 18:13]  Urine Protein 30      [04-24-22 @ 18:13]    Iron 62, TIBC 226, %sat 27      [04-20-22 @ 21:34]  Ferritin 293      [04-20-22 @ 21:34]  PTH -- (Ca --)      [04-24-22 @ 15:58]   217  Vitamin D (25OH) 23.8      [04-24-22 @ 23:11]    HCV 0.21, Nonreact      [04-21-22 @ 09:26]      RADIOLOGY & ADDITIONAL STUDIES:

## 2022-04-26 NOTE — PROGRESS NOTE ADULT - SUBJECTIVE AND OBJECTIVE BOX
DATE OF SERVICE: 04-26-22 @ 07:11    Subjective: Patient seen and examined. No new events except as noted.     SUBJECTIVE/ROS:      MEDICATIONS:  MEDICATIONS  (STANDING):  atorvastatin 20 milliGRAM(s) Oral at bedtime  cholecalciferol 2000 Unit(s) Oral daily  cloNIDine 0.1 milliGRAM(s) Oral two times a day  heparin   Injectable 5000 Unit(s) SubCutaneous every 8 hours  levothyroxine 25 MICROGram(s) Oral daily  midodrine. 5 milliGRAM(s) Oral three times a day  sodium chloride 0.45%. 1000 milliLiter(s) (75 mL/Hr) IV Continuous <Continuous>  tamsulosin 0.4 milliGRAM(s) Oral at bedtime      PHYSICAL EXAM:  T(C): 36.7 (04-26-22 @ 05:11), Max: 36.9 (04-25-22 @ 17:43)  HR: 80 (04-26-22 @ 05:11) (76 - 81)  BP: 157/85 (04-26-22 @ 05:11) (110/70 - 157/85)  RR: 16 (04-26-22 @ 05:11) (15 - 17)  SpO2: 100% (04-26-22 @ 05:11) (96% - 100%)  Wt(kg): --  I&O's Summary    25 Apr 2022 07:01  -  26 Apr 2022 07:00  --------------------------------------------------------  IN: 1518 mL / OUT: 3530 mL / NET: -2012 mL            JVP: Normal  Neck: supple  Lung: clear   CV: S1 S2 , Murmur:  Abd: soft  Ext: No edema  neuro: Awake   Psych: flat affect  Skin: normal``    LABS/DATA:    CARDIAC MARKERS:                                9.5    5.10  )-----------( 183      ( 25 Apr 2022 07:44 )             29.1     04-25    139  |  104  |  46<H>  ----------------------------<  88  4.8   |  21<L>  |  4.53<H>    Ca    8.3<L>      25 Apr 2022 07:44  Phos  3.7     04-25  Mg     1.60     04-25      proBNP:   Lipid Profile:   HgA1c:   TSH:     TELE:  EKG:

## 2022-04-27 ENCOUNTER — APPOINTMENT (OUTPATIENT)
Dept: UROLOGY | Facility: CLINIC | Age: 72
End: 2022-04-27

## 2022-04-27 LAB
ANION GAP SERPL CALC-SCNC: 14 MMOL/L — SIGNIFICANT CHANGE UP (ref 7–14)
BASOPHILS # BLD AUTO: 0.01 K/UL — SIGNIFICANT CHANGE UP (ref 0–0.2)
BASOPHILS NFR BLD AUTO: 0.2 % — SIGNIFICANT CHANGE UP (ref 0–2)
BUN SERPL-MCNC: 57 MG/DL — HIGH (ref 7–23)
CALCIUM SERPL-MCNC: 8 MG/DL — LOW (ref 8.4–10.5)
CHLORIDE SERPL-SCNC: 106 MMOL/L — SIGNIFICANT CHANGE UP (ref 98–107)
CO2 SERPL-SCNC: 19 MMOL/L — LOW (ref 22–31)
CREAT SERPL-MCNC: 4.26 MG/DL — HIGH (ref 0.5–1.3)
EGFR: 14 ML/MIN/1.73M2 — LOW
EOSINOPHIL # BLD AUTO: 0.26 K/UL — SIGNIFICANT CHANGE UP (ref 0–0.5)
EOSINOPHIL NFR BLD AUTO: 4.7 % — SIGNIFICANT CHANGE UP (ref 0–6)
GLUCOSE SERPL-MCNC: 83 MG/DL — SIGNIFICANT CHANGE UP (ref 70–99)
HCT VFR BLD CALC: 29 % — LOW (ref 39–50)
HGB BLD-MCNC: 9.3 G/DL — LOW (ref 13–17)
IANC: 3.75 K/UL — SIGNIFICANT CHANGE UP (ref 1.8–7.4)
IMM GRANULOCYTES NFR BLD AUTO: 0.4 % — SIGNIFICANT CHANGE UP (ref 0–1.5)
LYMPHOCYTES # BLD AUTO: 1.07 K/UL — SIGNIFICANT CHANGE UP (ref 1–3.3)
LYMPHOCYTES # BLD AUTO: 19.3 % — SIGNIFICANT CHANGE UP (ref 13–44)
MAGNESIUM SERPL-MCNC: 1.9 MG/DL — SIGNIFICANT CHANGE UP (ref 1.6–2.6)
MCHC RBC-ENTMCNC: 30.3 PG — SIGNIFICANT CHANGE UP (ref 27–34)
MCHC RBC-ENTMCNC: 32.1 GM/DL — SIGNIFICANT CHANGE UP (ref 32–36)
MCV RBC AUTO: 94.5 FL — SIGNIFICANT CHANGE UP (ref 80–100)
MONOCYTES # BLD AUTO: 0.44 K/UL — SIGNIFICANT CHANGE UP (ref 0–0.9)
MONOCYTES NFR BLD AUTO: 7.9 % — SIGNIFICANT CHANGE UP (ref 2–14)
NEUTROPHILS # BLD AUTO: 3.75 K/UL — SIGNIFICANT CHANGE UP (ref 1.8–7.4)
NEUTROPHILS NFR BLD AUTO: 67.5 % — SIGNIFICANT CHANGE UP (ref 43–77)
NRBC # BLD: 0 /100 WBCS — SIGNIFICANT CHANGE UP
NRBC # FLD: 0 K/UL — SIGNIFICANT CHANGE UP
PHOSPHATE SERPL-MCNC: 4.6 MG/DL — HIGH (ref 2.5–4.5)
PLATELET # BLD AUTO: 177 K/UL — SIGNIFICANT CHANGE UP (ref 150–400)
POTASSIUM SERPL-MCNC: 4.5 MMOL/L — SIGNIFICANT CHANGE UP (ref 3.5–5.3)
POTASSIUM SERPL-SCNC: 4.5 MMOL/L — SIGNIFICANT CHANGE UP (ref 3.5–5.3)
RBC # BLD: 3.07 M/UL — LOW (ref 4.2–5.8)
RBC # FLD: 15 % — HIGH (ref 10.3–14.5)
SARS-COV-2 RNA SPEC QL NAA+PROBE: SIGNIFICANT CHANGE UP
SODIUM SERPL-SCNC: 139 MMOL/L — SIGNIFICANT CHANGE UP (ref 135–145)
WBC # BLD: 5.55 K/UL — SIGNIFICANT CHANGE UP (ref 3.8–10.5)
WBC # FLD AUTO: 5.55 K/UL — SIGNIFICANT CHANGE UP (ref 3.8–10.5)

## 2022-04-27 RX ADMIN — MIDODRINE HYDROCHLORIDE 10 MILLIGRAM(S): 2.5 TABLET ORAL at 00:48

## 2022-04-27 RX ADMIN — Medication 0.1 MILLIGRAM(S): at 16:59

## 2022-04-27 RX ADMIN — ATORVASTATIN CALCIUM 20 MILLIGRAM(S): 80 TABLET, FILM COATED ORAL at 22:01

## 2022-04-27 RX ADMIN — HEPARIN SODIUM 5000 UNIT(S): 5000 INJECTION INTRAVENOUS; SUBCUTANEOUS at 05:27

## 2022-04-27 RX ADMIN — HEPARIN SODIUM 5000 UNIT(S): 5000 INJECTION INTRAVENOUS; SUBCUTANEOUS at 22:01

## 2022-04-27 RX ADMIN — TAMSULOSIN HYDROCHLORIDE 0.4 MILLIGRAM(S): 0.4 CAPSULE ORAL at 22:01

## 2022-04-27 RX ADMIN — HEPARIN SODIUM 5000 UNIT(S): 5000 INJECTION INTRAVENOUS; SUBCUTANEOUS at 13:42

## 2022-04-27 RX ADMIN — Medication 25 MICROGRAM(S): at 05:27

## 2022-04-27 RX ADMIN — Medication 0.1 MILLIGRAM(S): at 05:27

## 2022-04-27 RX ADMIN — Medication 2000 UNIT(S): at 13:42

## 2022-04-27 RX ADMIN — SODIUM CHLORIDE 75 MILLILITER(S): 9 INJECTION, SOLUTION INTRAVENOUS at 22:15

## 2022-04-27 NOTE — PROGRESS NOTE ADULT - NS ATTEND AMEND GEN_ALL_CORE FT
RUTH: Improving-continue 1/2NS-monitor renal function
BP is better controlled.
Seen and examined pt independently.

## 2022-04-27 NOTE — DIETITIAN INITIAL EVALUATION ADULT - PERTINENT LABORATORY DATA
04-27    139  |  106  |  57<H>  ----------------------------<  83  4.5   |  19<L>  |  4.26<H>    Ca    8.0<L>      27 Apr 2022 07:42  Phos  4.6     04-27  Mg     1.90     04-27

## 2022-04-27 NOTE — DIETITIAN INITIAL EVALUATION ADULT - OTHER INFO
RD visited with patient for length of stay nutrition assessment. Admitted for RUTH on CKD.  Patient s/p placement of Lt nephrostomy tube. Patient reported good appetite, reported to be completing meals with >75% meal completion.  Denies chewing/swallowing difficulties. No GI distress reported i.e. nausea, vomiting, diarrhea. No food allergies noted or reported. Patient without issued or concerns; complimentary of menu and foods being provided.  Reported usual body weight approximately 200 pounds. Denies recent weight changes.

## 2022-04-27 NOTE — PROGRESS NOTE ADULT - ASSESSMENT
Orthostatic hypotension   severe autonomic dysfunction   likely chronic   monitor orthostatic vitals  needs aggressive PT   cont midodrine and clonidine   cont midodrine 10 tid   this will likely take a while to improve , needs aggressive PT , rehabilitation at rehab center     HTN  as above     RUTH  as per renal     Diastolic CHF  mild   grade I   stable

## 2022-04-27 NOTE — PROGRESS NOTE ADULT - ASSESSMENT
Patient is a 70 Y/O Male with PMHx of HTN, HLD, CKD, lymphoma, vertigo, hypothyroidism, B/L ureteral stents (placed 15 years ago), presenting to ED from urologist office for nephrostomy tube placement iso renal failure with severe L hydronephrosis. Procedure was done by IR under light sedation without issue but afterwards, patient became hypoxic requiring intubation. Patient now extubated and admitted for further monitoring.

## 2022-04-27 NOTE — DIETITIAN INITIAL EVALUATION ADULT - ADD RECOMMEND
1) Monitor weights, PO intake/diet tolerance, skin integrity, pertinent labs.   2) Please consistently document % PO intake in nursing flowsheets to assess adequacy of nutritional intake/monitor need for further nutritional intervention(s).

## 2022-04-27 NOTE — PROGRESS NOTE ADULT - SUBJECTIVE AND OBJECTIVE BOX
[facility] [place] [facility]   MD KWASI SUH MD KRISTINE SOLTANPOUR, DO ANGELA WONG, PA    TEL:  OFFICE: [phone]  From 5pm-7am Answering Service [phone]    -- RENAL FOLLOW UP NOTE ---Date of Service 04-27-22 @ 14:07    Patient is a 71y old  Male who presents with a chief complaint of Essential hypertension     (27 Apr 2022 12:52)      Patient seen and examined at bedside. No chest pain/sob    VITALS:  T(F): 98.2 (04-27-22 @ 13:00), Max: 98.5 (04-26-22 @ 21:50)  HR: 78 (04-27-22 @ 13:00)  BP: 131/66 (04-27-22 @ 13:00)  RR: 18 (04-27-22 @ 13:00)  SpO2: 100% (04-27-22 @ 13:00)  Wt(kg): --    04-26 @ 07:01  -  04-27 @ 07:00  --------------------------------------------------------  IN: 690 mL / OUT: 3425 mL / NET: -2735 mL          PHYSICAL EXAM:  Constitutional: NAD  Neck: No JVD  Respiratory: CTAB, no wheezes, rales or rhonchi  Cardiovascular: S1, S2, RRR  Gastrointestinal: BS+, soft, NT/ND. + left NT  Extremities: No peripheral edema    Hospital Medications:   MEDICATIONS  (STANDING):  atorvastatin 20 milliGRAM(s) Oral at bedtime  cholecalciferol 2000 Unit(s) Oral daily  cloNIDine 0.1 milliGRAM(s) Oral two times a day  heparin   Injectable 5000 Unit(s) SubCutaneous every 8 hours  levothyroxine 25 MICROGram(s) Oral daily  midodrine. 10 milliGRAM(s) Oral three times a day  sodium chloride 0.45%. 1000 milliLiter(s) (75 mL/Hr) IV Continuous <Continuous>  tamsulosin 0.4 milliGRAM(s) Oral at bedtime      LABS:  04-27    139  |  106  |  57<H>  ----------------------------<  83  4.5   |  19<L>  |  4.26<H>    Ca    8.0<L>      27 Apr 2022 07:42  Phos  4.6     04-27  Mg     1.90     04-27      Creatinine Trend: 4.26 <--, 4.29 <--, 4.53 <--, 4.91 <--, 5.58 <--, 6.03 <--, 6.66 <--, 6.75 <--    Phosphorus Level, Serum: 4.6 mg/dL (04-27 @ 07:42)                              9.3    5.55  )-----------( 177      ( 27 Apr 2022 07:42 )             29.0     Urine Studies:  Urinalysis - [04-20-22 @ 14:37]      Color Light Yellow / Appearance Clear / SG 1.018 / pH 6.5      Gluc Negative / Ketone Negative  / Bili Negative / Urobili <2 mg/dL       Blood Small / Protein 100 mg/dL / Leuk Est Small / Nitrite Negative      RBC  / WBC 1 / Hyaline  / Gran  / Sq Epi  / Non Sq Epi 1 / Bacteria Negative    Urine Creatinine 76      [04-24-22 @ 18:13]  Urine Protein 30      [04-24-22 @ 18:13]    Iron 62, TIBC 226, %sat 27      [04-20-22 @ 21:34]  Ferritin 293      [04-20-22 @ 21:34]  PTH -- (Ca --)      [04-24-22 @ 15:58]   217  Vitamin D (25OH) 23.8      [04-24-22 @ 23:11]    HCV 0.21, Nonreact      [04-21-22 @ 09:26]      RADIOLOGY & ADDITIONAL STUDIES:

## 2022-04-27 NOTE — PROGRESS NOTE ADULT - SUBJECTIVE AND OBJECTIVE BOX
SUBJECTIVE / OVERNIGHT EVENTS:  BP swings, overall stable.   still orthostatic, repeat pending.  Cr improving gradually.  no cp, no sob, no n/v/d. no abdominal pain.  no headache, no dizziness.         --------------------------------------------------------------------------------------------  LABS:                        9.3    5.55  )-----------( 177      ( 27 Apr 2022 07:42 )             29.0     04-27    139  |  106  |  57<H>  ----------------------------<  83  4.5   |  19<L>  |  4.26<H>    Ca    8.0<L>      27 Apr 2022 07:42  Phos  4.6     04-27  Mg     1.90     04-27        CAPILLARY BLOOD GLUCOSE                RADIOLOGY & ADDITIONAL TESTS:    Imaging Personally Reviewed:  [x] YES  [ ] NO    Consultant(s) Notes Reviewed:  [x] YES  [ ] NO    MEDICATIONS  (STANDING):  atorvastatin 20 milliGRAM(s) Oral at bedtime  cholecalciferol 2000 Unit(s) Oral daily  cloNIDine 0.1 milliGRAM(s) Oral two times a day  heparin   Injectable 5000 Unit(s) SubCutaneous every 8 hours  levothyroxine 25 MICROGram(s) Oral daily  midodrine. 10 milliGRAM(s) Oral three times a day  sodium chloride 0.45%. 1000 milliLiter(s) (75 mL/Hr) IV Continuous <Continuous>  tamsulosin 0.4 milliGRAM(s) Oral at bedtime    MEDICATIONS  (PRN):      Care Discussed with Consultants/Other Providers [x] YES  [ ] NO    Vital Signs Last 24 Hrs  T(C): 36.6 (27 Apr 2022 16:59), Max: 36.8 (27 Apr 2022 00:45)  T(F): 97.9 (27 Apr 2022 16:59), Max: 98.2 (27 Apr 2022 00:45)  HR: 77 (27 Apr 2022 16:59) (69 - 83)  BP: 157/85 (27 Apr 2022 16:59) (131/66 - 184/100)  BP(mean): --  RR: 18 (27 Apr 2022 16:59) (16 - 18)  SpO2: 98% (27 Apr 2022 16:59) (98% - 100%)  I&O's Summary    26 Apr 2022 07:01  -  27 Apr 2022 07:00  --------------------------------------------------------  IN: 690 mL / OUT: 3425 mL / NET: -2735 mL    27 Apr 2022 07:01  -  27 Apr 2022 22:17  --------------------------------------------------------  IN: 354 mL / OUT: 1000 mL / NET: -646 mL      PHYSICAL EXAM:  GENERAL: NAD, well-developed, comfortable  HEAD:  Atraumatic, Normocephalic  EYES: EOMI, PERRLA, conjunctiva and sclera clear  NECK: Supple, No JVD  CHEST/LUNG: Clear to auscultation bilaterally; No wheeze  HEART: Regular rate and rhythm; No murmurs, rubs, or gallops  ABDOMEN: Soft, Nontender, Nondistended; Bowel sounds present  Neuro: AAOx3, no focal weakness, 5/5 b/l extremity strength  EXTREMITIES:  2+ Peripheral Pulses, No clubbing, cyanosis, or edema  SKIN: No rashes or lesions

## 2022-04-27 NOTE — PROGRESS NOTE ADULT - NS ATTEND OPT1 GEN_ALL_CORE
I independently performed the documented:
I attest my time as attending is greater than 50% of the total combined time spent on qualifying patient care activities by the PA/NP and attending.
I attest my time as attending is greater than 50% of the total combined time spent on qualifying patient care activities by the PA/NP and attending.

## 2022-04-27 NOTE — DIETITIAN INITIAL EVALUATION ADULT - PERTINENT MEDS FT
MEDICATIONS  (STANDING):  atorvastatin 20 milliGRAM(s) Oral at bedtime  cholecalciferol 2000 Unit(s) Oral daily  cloNIDine 0.1 milliGRAM(s) Oral two times a day  heparin   Injectable 5000 Unit(s) SubCutaneous every 8 hours  levothyroxine 25 MICROGram(s) Oral daily  midodrine. 10 milliGRAM(s) Oral three times a day  sodium chloride 0.45%. 1000 milliLiter(s) (75 mL/Hr) IV Continuous <Continuous>  tamsulosin 0.4 milliGRAM(s) Oral at bedtime    MEDICATIONS  (PRN):

## 2022-04-27 NOTE — DIETITIAN INITIAL EVALUATION ADULT - NSICDXPASTMEDICALHX_GEN_ALL_CORE_FT
PAST MEDICAL HISTORY:  BPH (benign prostatic hyperplasia)     Chronic kidney disease, unspecified CKD stage     Hyperlipidemia     Hypertension     Hypothyroidism     Lymphoma     Vertigo

## 2022-04-27 NOTE — PROGRESS NOTE ADULT - ASSESSMENT
70 yo M hx of HTN, HLD, CKD, BPH, R eye blindness, lymphoma, vertigo, hypothyroidism, b/l ureteral stents (placed 15 years ago), presenting to ED from urologist office with worsen L hydro and renal function s/p urgent nephrostomy tube placement by IR.  nephrology consulted for worsen renal failure.    Renal failure  RUTH likely sec to obstruction, now s/p urgent nephrostomy tube placement  renal function is improving  Has underlying CKD stage 4  unclear baseline, in 07/21 Scr 2.5  Continue IVF-1/2 NS @ 75cc/hr. monitor urine output and fluid status  Monitor I/O  f/u /IR  avoid nephrotoxic agents  monitor bmp.  pt will need further nephrology follow up. Patient advised to follow up with Dr. dewitt in 1-2 weeks after discharge    HTN  bp fluctuating greatly  orthostatic-follow up cardiology   on clonidine and midodrine   monitor    proteinuria  100 protein in urine  spot urine p/c ratio 0.395  monitor    hypocalcemia  elevated PTH  low vit d 25  start vit d supplement  monitor    anemia   not iron deficiency   monitor       72 yo M hx of HTN, HLD, CKD, BPH, R eye blindness, lymphoma, vertigo, hypothyroidism, b/l ureteral stents (placed 15 years ago), presenting to ED from urologist office with worsen L hydro and renal function s/p urgent nephrostomy tube placement by IR.  nephrology consulted for worsen renal failure.    Renal failure  RUTH likely sec to obstruction, now s/p urgent nephrostomy tube placement  renal function is improving  Has underlying CKD stage 4  unclear baseline, in 07/21 Scr 2.5  Continue IVF-1/2 NS @ 75cc/hr. monitor urine output and fluid status  Monitor I/O  f/u /IR  avoid nephrotoxic agents  monitor bmp.  pt will need further nephrology follow up. Patient advised to follow up with Dr. dewitt in 1-2 weeks after discharge    HTN  bp fluctuating greatly  orthostatic-follow up cardiology   on clonidine and midodrine   monitor    proteinuria  100 protein in urine  spot urine p/c ratio 0.395  monitor    hypocalcemia  elevated PTH  low vit d 25  start vit d supplement  monitor    hyperphos  renal diet  monitor    anemia   not iron deficiency   monitor

## 2022-04-27 NOTE — PROGRESS NOTE ADULT - SUBJECTIVE AND OBJECTIVE BOX
DATE OF SERVICE: 04-27-22 @ 06:07    Subjective: Patient seen and examined. No new events except as noted.     SUBJECTIVE/ROS:  feels ok       MEDICATIONS:  MEDICATIONS  (STANDING):  atorvastatin 20 milliGRAM(s) Oral at bedtime  cholecalciferol 2000 Unit(s) Oral daily  cloNIDine 0.1 milliGRAM(s) Oral two times a day  heparin   Injectable 5000 Unit(s) SubCutaneous every 8 hours  levothyroxine 25 MICROGram(s) Oral daily  midodrine. 10 milliGRAM(s) Oral three times a day  sodium chloride 0.45%. 1000 milliLiter(s) (75 mL/Hr) IV Continuous <Continuous>  tamsulosin 0.4 milliGRAM(s) Oral at bedtime      PHYSICAL EXAM:  T(C): 36.8 (04-27-22 @ 00:45), Max: 36.9 (04-26-22 @ 21:50)  HR: 83 (04-27-22 @ 00:45) (69 - 83)  BP: 133/82 (04-27-22 @ 00:45) (122/64 - 198/93)  RR: 16 (04-27-22 @ 00:45) (16 - 17)  SpO2: 100% (04-27-22 @ 00:45) (100% - 100%)  Wt(kg): --  I&O's Summary    25 Apr 2022 07:01  -  26 Apr 2022 07:00  --------------------------------------------------------  IN: 1518 mL / OUT: 3530 mL / NET: -2012 mL    26 Apr 2022 07:01  -  27 Apr 2022 06:07  --------------------------------------------------------  IN: 540 mL / OUT: 2525 mL / NET: -1985 mL            JVP: Normal  Neck: supple  Lung: clear   CV: S1 S2 , Murmur:  Abd: soft  Ext: No edema  neuro: Awake / alert  Psych: flat affect  Skin: normal``    LABS/DATA:    CARDIAC MARKERS:                                9.7    5.34  )-----------( 185      ( 26 Apr 2022 08:14 )             30.5     04-26    141  |  106  |  47<H>  ----------------------------<  89  5.0   |  21<L>  |  4.29<H>    Ca    8.1<L>      26 Apr 2022 08:14  Phos  3.8     04-26  Mg     2.00     04-26      proBNP:   Lipid Profile:   HgA1c:   TSH:     TELE:  EKG:

## 2022-04-28 LAB
ANION GAP SERPL CALC-SCNC: 14 MMOL/L — SIGNIFICANT CHANGE UP (ref 7–14)
BASOPHILS # BLD AUTO: 0.02 K/UL — SIGNIFICANT CHANGE UP (ref 0–0.2)
BASOPHILS NFR BLD AUTO: 0.4 % — SIGNIFICANT CHANGE UP (ref 0–2)
BUN SERPL-MCNC: 56 MG/DL — HIGH (ref 7–23)
CALCIUM SERPL-MCNC: 8.1 MG/DL — LOW (ref 8.4–10.5)
CHLORIDE SERPL-SCNC: 108 MMOL/L — HIGH (ref 98–107)
CO2 SERPL-SCNC: 18 MMOL/L — LOW (ref 22–31)
CREAT SERPL-MCNC: 4.16 MG/DL — HIGH (ref 0.5–1.3)
EGFR: 15 ML/MIN/1.73M2 — LOW
EOSINOPHIL # BLD AUTO: 0.3 K/UL — SIGNIFICANT CHANGE UP (ref 0–0.5)
EOSINOPHIL NFR BLD AUTO: 5.5 % — SIGNIFICANT CHANGE UP (ref 0–6)
GLUCOSE SERPL-MCNC: 75 MG/DL — SIGNIFICANT CHANGE UP (ref 70–99)
HCT VFR BLD CALC: 28.9 % — LOW (ref 39–50)
HGB BLD-MCNC: 9.5 G/DL — LOW (ref 13–17)
IANC: 3.58 K/UL — SIGNIFICANT CHANGE UP (ref 1.8–7.4)
IMM GRANULOCYTES NFR BLD AUTO: 0.2 % — SIGNIFICANT CHANGE UP (ref 0–1.5)
LYMPHOCYTES # BLD AUTO: 1.18 K/UL — SIGNIFICANT CHANGE UP (ref 1–3.3)
LYMPHOCYTES # BLD AUTO: 21.7 % — SIGNIFICANT CHANGE UP (ref 13–44)
MAGNESIUM SERPL-MCNC: 1.8 MG/DL — SIGNIFICANT CHANGE UP (ref 1.6–2.6)
MCHC RBC-ENTMCNC: 30.5 PG — SIGNIFICANT CHANGE UP (ref 27–34)
MCHC RBC-ENTMCNC: 32.9 GM/DL — SIGNIFICANT CHANGE UP (ref 32–36)
MCV RBC AUTO: 92.9 FL — SIGNIFICANT CHANGE UP (ref 80–100)
MONOCYTES # BLD AUTO: 0.35 K/UL — SIGNIFICANT CHANGE UP (ref 0–0.9)
MONOCYTES NFR BLD AUTO: 6.4 % — SIGNIFICANT CHANGE UP (ref 2–14)
NEUTROPHILS # BLD AUTO: 3.58 K/UL — SIGNIFICANT CHANGE UP (ref 1.8–7.4)
NEUTROPHILS NFR BLD AUTO: 65.8 % — SIGNIFICANT CHANGE UP (ref 43–77)
NRBC # BLD: 0 /100 WBCS — SIGNIFICANT CHANGE UP
NRBC # FLD: 0 K/UL — SIGNIFICANT CHANGE UP
PHOSPHATE SERPL-MCNC: 4 MG/DL — SIGNIFICANT CHANGE UP (ref 2.5–4.5)
PLATELET # BLD AUTO: 174 K/UL — SIGNIFICANT CHANGE UP (ref 150–400)
POTASSIUM SERPL-MCNC: 4.5 MMOL/L — SIGNIFICANT CHANGE UP (ref 3.5–5.3)
POTASSIUM SERPL-SCNC: 4.5 MMOL/L — SIGNIFICANT CHANGE UP (ref 3.5–5.3)
RBC # BLD: 3.11 M/UL — LOW (ref 4.2–5.8)
RBC # FLD: 15.1 % — HIGH (ref 10.3–14.5)
SODIUM SERPL-SCNC: 140 MMOL/L — SIGNIFICANT CHANGE UP (ref 135–145)
WBC # BLD: 5.44 K/UL — SIGNIFICANT CHANGE UP (ref 3.8–10.5)
WBC # FLD AUTO: 5.44 K/UL — SIGNIFICANT CHANGE UP (ref 3.8–10.5)

## 2022-04-28 RX ORDER — SODIUM BICARBONATE 1 MEQ/ML
650 SYRINGE (ML) INTRAVENOUS THREE TIMES A DAY
Refills: 0 | Status: DISCONTINUED | OUTPATIENT
Start: 2022-04-28 | End: 2022-04-29

## 2022-04-28 RX ADMIN — HEPARIN SODIUM 5000 UNIT(S): 5000 INJECTION INTRAVENOUS; SUBCUTANEOUS at 13:52

## 2022-04-28 RX ADMIN — Medication 0.1 MILLIGRAM(S): at 05:19

## 2022-04-28 RX ADMIN — Medication 650 MILLIGRAM(S): at 22:14

## 2022-04-28 RX ADMIN — TAMSULOSIN HYDROCHLORIDE 0.4 MILLIGRAM(S): 0.4 CAPSULE ORAL at 22:14

## 2022-04-28 RX ADMIN — Medication 0.2 MILLIGRAM(S): at 17:13

## 2022-04-28 RX ADMIN — MIDODRINE HYDROCHLORIDE 10 MILLIGRAM(S): 2.5 TABLET ORAL at 05:20

## 2022-04-28 RX ADMIN — Medication 25 MICROGRAM(S): at 05:18

## 2022-04-28 RX ADMIN — HEPARIN SODIUM 5000 UNIT(S): 5000 INJECTION INTRAVENOUS; SUBCUTANEOUS at 05:19

## 2022-04-28 RX ADMIN — HEPARIN SODIUM 5000 UNIT(S): 5000 INJECTION INTRAVENOUS; SUBCUTANEOUS at 22:16

## 2022-04-28 RX ADMIN — ATORVASTATIN CALCIUM 20 MILLIGRAM(S): 80 TABLET, FILM COATED ORAL at 22:14

## 2022-04-28 RX ADMIN — Medication 650 MILLIGRAM(S): at 13:51

## 2022-04-28 RX ADMIN — Medication 2000 UNIT(S): at 13:52

## 2022-04-28 RX ADMIN — MIDODRINE HYDROCHLORIDE 10 MILLIGRAM(S): 2.5 TABLET ORAL at 22:44

## 2022-04-28 NOTE — PROGRESS NOTE ADULT - PROBLEM SELECTOR PLAN 1
Respiratory failure likely in the setting of sedative medications. May be delayed reaction to anesthesia that occurred after procedure. Low suspicion for primary pulmonary issue as contributor of event since patient rapidly recovered.   - Patient now extubated and maintaining SpO2 100% on room air  - CXR overall clear except mild atelectasis.  - Monitor VS  - Supplemental O2 PRN
Respiratory failure likely in setting of sedative medications. May be delayed reaction to anesthesia that occurred after procedure. Low suspicion for primary pulmonary issue as contributor of event since patient rapidly recovered.   - Patient now extubated and maintaining SpO2 100% on room air  - Will order CXR, but lungs with no adventitious breath sounds on exam. No evidence of infectious process at this time.  - Monitor VS q4hrs   - Supplemental O2 PRN
Respiratory failure likely in the setting of sedative medications. May be delayed reaction to anesthesia that occurred after procedure. Low suspicion for primary pulmonary issue as contributor of event since patient rapidly recovered.   - Patient now extubated and maintaining SpO2 100% on room air  - CXR overall clear except mild atelectasis.  - Monitor VS  - Supplemental O2 PRN

## 2022-04-28 NOTE — PROGRESS NOTE ADULT - PROBLEM SELECTOR PLAN 5
Pt on nifedipine and labetalol at home. BPs in acceptable range.   - Will hold home BP meds (nifedipine and labetalol) for now given positive orthostatics on admission and recent sedation. Will resume as BP tolerates.
Pt on nifedipine and labetalol at home. BPs in acceptable range.   - Will hold home BP meds (nifedipine and labetalol) for now given positive orthostatics on admission and recent sedation. Will resume as BP tolerates.
Pt on nifedipine and labetalol at home. BPs in acceptable range.   - Will hold home BP meds (nifedipine and labetalol) for now given positive orthostatics on admission and recent sedation.   - on Midodrine and clonidine per cardiology
Pt on nifedipine and labetalol at home. BPs in acceptable range.   - Will hold home BP meds (nifedipine and labetalol) for now given positive orthostatics on admission and recent sedation.   - on Midodrine and clonidine per cardiology  - wide swing in BP with orthostasis, likely autonomic neuropathy. relatively asymptomatic. This is probably the best we can do. fall precautions advised, d/w patient.
Pt on nifedipine and labetalol at home. BPs in acceptable range.   - Will hold home BP meds (nifedipine and labetalol) for now given positive orthostatics on admission and recent sedation. Will resume as BP tolerates.

## 2022-04-28 NOTE — PROGRESS NOTE ADULT - PROBLEM SELECTOR PLAN 3
- hgb stable   - Iron studies   - Monitor Hgb for now
- hgb stable   - Iron studies   - Monitor Hgb for now
Hgb 9.2, unclear baseline. However, likely in setting of CKD and anemia of chronic disease. Patient without evidence of bleeding.   -  iron studies   - Monitor Hgb for now
Hgb 9.2, unclear baseline. However, likely in setting of CKD and anemia of chronic disease. Patient without evidence of bleeding.   - Will check iron studies   - Monitor Hgb for now
Hgb 9.2, unclear baseline. However, likely in setting of CKD and anemia of chronic disease. Patient without evidence of bleeding.   -  iron studies   - Monitor Hgb for now
- hgb stable   - Iron studies   - Monitor Hgb for now

## 2022-04-28 NOTE — PROVIDER CONTACT NOTE (MEDICATION) - ACTION/TREATMENT ORDERED:
As per PA, okay to administer midodrine and clonidine now.
PA aware, and as per PA okay to hold midodrine.
PA aware and at bedside with patient and RN.

## 2022-04-28 NOTE — PROVIDER CONTACT NOTE (MEDICATION) - SITUATION
Manual /90, pt asymptomatic denies any signs of distress or discomfort. Holding midodrine dose.
/82, pt due for midodrine and clonidine. Do I administer midodrine with the clonidine?
pt refusing midodrine, pt educated on importance however still refusing

## 2022-04-28 NOTE — PROGRESS NOTE ADULT - PROBLEM SELECTOR PLAN 10
DVT ppx: Heparin SQ, as urine with minimal blood and no active bleeding, hemostasis appears to have been achieved post-procedure.   Diet: Regular   Dispo: Likely home, PT?  Code: Full
DVT ppx: Heparin SQ, as urine with minimal blood and no active bleeding, hemostasis appears to have been achieved post-procedure.   Diet: Regular   Dispo: Likely home, no skills need per PT.   Code: Full
DVT ppx: Heparin SQ, as urine with minimal blood and no active bleeding, hemostasis appears to have been achieved post-procedure.   Diet: Regular   Dispo: Likely home, PT?  Code: Full
DVT ppx: Heparin SQ, as urine with minimal blood and no active bleeding, hemostasis appears to have been achieved post-procedure.   Diet: Regular   Dispo: Likely home, no skills need per PT.   Code: Full

## 2022-04-28 NOTE — PROVIDER CONTACT NOTE (OTHER) - RECOMMENDATIONS
As per PA, will follow up with attending in terms of medication adjust and continue to monitor at this time

## 2022-04-28 NOTE — PROGRESS NOTE ADULT - PROBLEM SELECTOR PLAN 9
- C/w meclizine bid PRN

## 2022-04-28 NOTE — PROGRESS NOTE ADULT - ASSESSMENT
70 yo M hx of HTN, HLD, CKD, BPH, R eye blindness, lymphoma, vertigo, hypothyroidism, b/l ureteral stents (placed 15 years ago), presenting to ED from urologist office with worsen L hydro and renal function s/p urgent nephrostomy tube placement by IR.  nephrology consulted for worsen renal failure.    Renal failure  RUTH likely sec to obstruction, now s/p urgent nephrostomy tube placement  renal function is improving  Has underlying CKD stage 4  unclear baseline, in 07/21 Scr 2.5  Continue IVF-1/2 NS @ 75cc/hr. monitor urine output and fluid status  still have high urinary output  Monitor I/O  f/u /IR  avoid nephrotoxic agents  monitor bmp.  pt will need further nephrology follow up. Patient advised to follow up with Dr. dewitt in 1-2 weeks after discharge    HTN  bp fluctuating greatly  orthostatic-follow up cardiology   on clonidine and midodrine   monitor    proteinuria  100 protein in urine  spot urine p/c ratio 0.395  monitor    hypocalcemia  elevated PTH  low vit d 25  start vit d supplement  monitor    hyperphos  renal diet  better  monitor    anemia   not iron deficiency   monitor    acidosis  Non AG  start oral bicarb 650 tid  monitor       72 yo M hx of HTN, HLD, CKD, BPH, R eye blindness, lymphoma, vertigo, hypothyroidism, b/l ureteral stents (placed 15 years ago), presenting to ED from urologist office with worsen L hydro and renal function s/p urgent nephrostomy tube placement by IR.  nephrology consulted for worsen renal failure.    Renal failure  RUTH likely sec to obstruction, now s/p urgent nephrostomy tube placement  renal function is improving  Has underlying CKD stage 4  unclear baseline, in 07/21 Scr 2.5  Continue IVF-1/2 NS @ 75cc/hr. monitor urine output and fluid status  still have high urinary output  Monitor I/O  f/u /IR  avoid nephrotoxic agents  monitor bmp.  pt will need further nephrology follow up. Patient advised to follow up with Dr. Skinner in 1-2 weeks after discharge    HTN  bp fluctuating greatly  orthostatic-follow up cardiology   on clonidine and midodrine   monitor    proteinuria  100 protein in urine  spot urine p/c ratio 0.395  monitor    hypocalcemia  elevated PTH  low vit d 25  start vit d supplement  monitor    hyperphos  renal diet  better  monitor    anemia   not iron deficiency   monitor    acidosis  Non AG  start oral bicarb 650 tid  monitor

## 2022-04-28 NOTE — PROGRESS NOTE ADULT - SUBJECTIVE AND OBJECTIVE BOX
71y Male s/p left PCN on 4/20/22 in Interventional Radiology.  Patient seen and examined bedside resting comfortably. No complaints offered.    T(F): 98.2 (04-28-22 @ 08:52), Max: 98.4 (04-28-22 @ 05:15)  HR: 74 (04-28-22 @ 08:52) (71 - 77)  BP: 166/98 (04-28-22 @ 08:52) (140/82 - 176/90)  RR: 20 (04-28-22 @ 08:52) (16 - 20)  SpO2: 100% (04-28-22 @ 08:52) (98% - 100%)  Wt(kg): --    LABS:                        9.5    5.44  )-----------( 174      ( 28 Apr 2022 07:34 )             28.9     04-28    140  |  108<H>  |  56<H>  ----------------------------<  75  4.5   |  18<L>  |  4.16<H>    Ca    8.1<L>      28 Apr 2022 07:34  Phos  4.0     04-28  Mg     1.80     04-28        I&O's Detail    27 Apr 2022 07:01  -  28 Apr 2022 07:00  --------------------------------------------------------  IN:    Oral Fluid: 754 mL  Total IN: 754 mL    OUT:    Nephrostomy Tube (mL): 1900 mL    Voided (mL): 1400 mL  Total OUT: 3300 mL    Total NET: -2546 mL      28 Apr 2022 07:01  -  28 Apr 2022 13:30  --------------------------------------------------------  IN:    Oral Fluid: 300 mL  Total IN: 300 mL    OUT:    Nephrostomy Tube (mL): 500 mL    Voided (mL): 350 mL  Total OUT: 850 mL    Total NET: -550 mL            PHYSICAL EXAM:  General: Nontoxic, in NAD  L PCN c/d/i, clear urine in bag, flushed drain with 5cc NS

## 2022-04-28 NOTE — PROGRESS NOTE ADULT - NSPROGADDITIONALINFOA_GEN_ALL_CORE
discussed with patient's daughter over the phone
--- Coverage for Dr. Zhao ---  - Dr. MICHAEL Stone ([facility])  - [phone]
discussed with patient's daughter at the bedside
Dr Stone will be covering me from Wednesday the 27th till sunday.
DC planning.     --- Coverage for Dr. Zhao ---  - Dr. MICHAEL [facility] ([facility])  - [phone]

## 2022-04-28 NOTE — PROVIDER CONTACT NOTE (MEDICATION) - REASON
pt refusing midodrine, pt educated on importance however still refusing
/82, pt due for midodrine and clonidine. Do I administer midodrine with the clonidine?
Manual /90, pt asymptomatic denies any signs of distress or discomfort. Holding midodrine dose.

## 2022-04-28 NOTE — PROGRESS NOTE ADULT - PROBLEM SELECTOR PROBLEM 2
Acute kidney injury superimposed on CKD

## 2022-04-28 NOTE — PROGRESS NOTE ADULT - PROBLEM SELECTOR PLAN 4
Noted to have positive orthostatics in the ED. Patient denies any symptoms of lightheadedness.  - Encourage PO intake/hydration  - cont IV hydration   - Repeat orthostatics  - Hold home BP meds (nifedipine and labetalol) for now   - Fall precautions  - PT eval  - check Echo  - card eval appreciated   - hydration as tolerated
Noted to have positive orthostatics in the ED. Patient denies any symptoms of lightheadedness.  - Encourage PO intake/hydration  - cont IV hydration   - Repeat orthostatics  - Hold home BP meds (nifedipine and labetalol) for now   - Fall precautions  - PT eval  - check Echo  - card eval appreciated   - hydration as tolerated  - started on clonidine and midodrine per cardiology  - if no improvement, may need florinef for positive orthostatics
Noted to have positive orthostatics in the ED. Patient denies any symptoms of lightheadedness.  - likely autonomic dysfunction.   - Encourage PO intake/hydration  - cont IV hydration   - Repeat orthostatics  - Hold home BP meds (nifedipine and labetalol) for now   - Fall precautions  - PT eval  - Echo: normal LV EF 65%  - card eval appreciated   - on gentle hydration per renal.   - started on clonidine and midodrine per cardiology. adjusted midodrine dose   - if no improvement, may need florinef for positive orthostatics
Noted to have positive orthostatics in the ED. Patient denies any symptoms of lightheadedness.  - likely autonomic dysfunction.   - Encourage PO intake/hydration  - cont IV hydration   - Repeat orthostatics  - Hold home BP meds (nifedipine and labetalol) for now   - Fall precautions  - PT eval  - Echo: normal LV EF 65%  - card eval appreciated   - on gentle hydration per renal.   - started on clonidine and midodrine per cardiology. adjusted midodrine dose   - if no improvement, may need florinef for positive orthostatics
Noted to have positive orthostatics in the ED. Patient denies any symptoms of lightheadedness.  - Encourage PO intake/hydration  - cont IV hydration   - Repeat orthostatics  - Hold home BP meds (nifedipine and labetalol) for now   - Fall precautions  - PT eval  - check Echo  - card eval appreciated   - hydration as tolerated  - starte don clonidine and midodrine per cardiology
Noted to have positive orthostatics in the ED. Patient denies any symptoms of lightheadedness.  - Encourage PO intake/hydration  - cont IV hydration   - Repeat orthostatics  - Hold home BP meds (nifedipine and labetalol) for now   - Fall precautions  - PT eval  - check Echo  - card eval called
Noted to have positive orthostatics in the ED. Patient denies any symptoms of lightheadedness.  - Encourage PO intake/hydration  - cont IV hydration   - Repeat orthostatics  - Hold home BP meds (nifedipine and labetalol) for now   - Fall precautions  - PT eval  - check Echo  - card eval appreciated   - hydration as tolerated  - started on clonidine and midodrine per cardiology  - if no improvement, may need florinef for positive orthostatics
Noted to have positive orthostatics in the ED. Patient denies any symptoms of lightheadedness.  - Encourage PO intake/hydration  - cont IV hydration   - Repeat orthostatics  - Hold home BP meds (nifedipine and labetalol) for now   - Fall precautions  - PT eval  - check Echo  - card eval appreciated   - hydration as tolerated  - started on clonidine and midodrine per cardiology. adjusted midodrine dose   - if no improvement, may need florinef for positive orthostatics

## 2022-04-28 NOTE — PROGRESS NOTE ADULT - SUBJECTIVE AND OBJECTIVE BOX
[facility] NEPHROLOGY PRACTICE   MD KWASI SUH MD KRISTINE SOLTANPOUR, ISELA ANTONY    TEL:  OFFICE: [phone]  From 5pm-7am Answering Service [phone]    -- RENAL FOLLOW UP NOTE ---Date of Service 04-28-22 @ 13:20    Patient is a 71y old  Male who presents with a chief complaint of Hypoxic respiratory failure s/p sedation (27 Apr 2022 16:16)      Patient seen and examined at bedside. No chest pain/sob. still c/o dizziness when standing up    VITALS:  T(F): 98.2 (04-28-22 @ 08:52), Max: 98.4 (04-28-22 @ 05:15)  HR: 74 (04-28-22 @ 08:52)  BP: 166/98 (04-28-22 @ 08:52)  RR: 20 (04-28-22 @ 08:52)  SpO2: 100% (04-28-22 @ 08:52)  Wt(kg): --    04-27 @ 07:01  -  04-28 @ 07:00  --------------------------------------------------------  IN: 754 mL / OUT: 3300 mL / NET: -2546 mL    04-28 @ 07:01  -  04-28 @ 13:20  --------------------------------------------------------  IN: 300 mL / OUT: 850 mL / NET: -550 mL          PHYSICAL EXAM:  Constitutional: NAD  Neck: No JVD  Respiratory: CTAB, no wheezes, rales or rhonchi  Cardiovascular: S1, S2, RRR  Gastrointestinal: BS+, soft, NT/ND  Extremities: No peripheral edema    Hospital Medications:   MEDICATIONS  (STANDING):  atorvastatin 20 milliGRAM(s) Oral at bedtime  cholecalciferol 2000 Unit(s) Oral daily  cloNIDine 0.2 milliGRAM(s) Oral two times a day  heparin   Injectable 5000 Unit(s) SubCutaneous every 8 hours  levothyroxine 25 MICROGram(s) Oral daily  midodrine. 10 milliGRAM(s) Oral three times a day  sodium bicarbonate 650 milliGRAM(s) Oral three times a day  sodium chloride 0.45%. 1000 milliLiter(s) (75 mL/Hr) IV Continuous <Continuous>  tamsulosin 0.4 milliGRAM(s) Oral at bedtime      LABS:  04-28    140  |  108<H>  |  56<H>  ----------------------------<  75  4.5   |  18<L>  |  4.16<H>    Ca    8.1<L>      28 Apr 2022 07:34  Phos  4.0     04-28  Mg     1.80     04-28      Creatinine Trend: 4.16 <--, 4.26 <--, 4.29 <--, 4.53 <--, 4.91 <--, 5.58 <--, 6.03 <--    Phosphorus Level, Serum: 4.0 mg/dL (04-28 @ 07:34)                              9.5    5.44  )-----------( 174      ( 28 Apr 2022 07:34 )             28.9     Urine Studies:  Urinalysis - [04-20-22 @ 14:37]      Color Light Yellow / Appearance Clear / SG 1.018 / pH 6.5      Gluc Negative / Ketone Negative  / Bili Negative / Urobili <2 mg/dL       Blood Small / Protein 100 mg/dL / Leuk Est Small / Nitrite Negative      RBC  / WBC 1 / Hyaline  / Gran  / Sq Epi  / Non Sq Epi 1 / Bacteria Negative    Urine Creatinine 76      [04-24-22 @ 18:13]  Urine Protein 30      [04-24-22 @ 18:13]    Iron 62, TIBC 226, %sat 27      [04-20-22 @ 21:34]  Ferritin 293      [04-20-22 @ 21:34]  PTH -- (Ca --)      [04-24-22 @ 15:58]   217  Vitamin D (25OH) 23.8      [04-24-22 @ 23:11]    HCV 0.21, Nonreact      [04-21-22 @ 09:26]      RADIOLOGY & ADDITIONAL STUDIES:   [facility]   MD KWASI SUH MD KRISTINE SOLTANPOUR, ISELA ANTONY    TEL:  OFFICE: [phone]  From 5pm-7am Answering Service [phone]    -- RENAL FOLLOW UP NOTE ---Date of Service 04-28-22 @ 13:20    Patient is a 71y old  Male who presents with a chief complaint of Hypoxic respiratory failure s/p sedation (27 Apr 2022 16:16)      Patient seen and examined at bedside. No chest pain/sob. still c/o dizziness when standing up    VITALS:  T(F): 98.2 (04-28-22 @ 08:52), Max: 98.4 (04-28-22 @ 05:15)  HR: 74 (04-28-22 @ 08:52)  BP: 166/98 (04-28-22 @ 08:52)  RR: 20 (04-28-22 @ 08:52)  SpO2: 100% (04-28-22 @ 08:52)  Wt(kg): --    04-27 @ 07:01  -  04-28 @ 07:00  --------------------------------------------------------  IN: 754 mL / OUT: 3300 mL / NET: -2546 mL    04-28 @ 07:01  -  04-28 @ 13:20  --------------------------------------------------------  IN: 300 mL / OUT: 850 mL / NET: -550 mL          PHYSICAL EXAM:  Constitutional: NAD  Neck: No JVD  Respiratory: CTAB, no wheezes, rales or rhonchi  Cardiovascular: S1, S2, RRR  Gastrointestinal: BS+, soft, NT/ND  Extremities: No peripheral edema    Hospital Medications:   MEDICATIONS  (STANDING):  atorvastatin 20 milliGRAM(s) Oral at bedtime  cholecalciferol 2000 Unit(s) Oral daily  cloNIDine 0.2 milliGRAM(s) Oral two times a day  heparin   Injectable 5000 Unit(s) SubCutaneous every 8 hours  levothyroxine 25 MICROGram(s) Oral daily  midodrine. 10 milliGRAM(s) Oral three times a day  sodium bicarbonate 650 milliGRAM(s) Oral three times a day  sodium chloride 0.45%. 1000 milliLiter(s) (75 mL/Hr) IV Continuous <Continuous>  tamsulosin 0.4 milliGRAM(s) Oral at bedtime      LABS:  04-28    140  |  108<H>  |  56<H>  ----------------------------<  75  4.5   |  18<L>  |  4.16<H>    Ca    8.1<L>      28 Apr 2022 07:34  Phos  4.0     04-28  Mg     1.80     04-28      Creatinine Trend: 4.16 <--, 4.26 <--, 4.29 <--, 4.53 <--, 4.91 <--, 5.58 <--, 6.03 <--    Phosphorus Level, Serum: 4.0 mg/dL (04-28 @ 07:34)                              9.5    5.44  )-----------( 174      ( 28 Apr 2022 07:34 )             28.9     Urine Studies:  Urinalysis - [04-20-22 @ 14:37]                                                                                                                                   Color Light Yellow / Appearance Clear / SG 1.018 / pH 6.5      Gluc Negative / Ketone Negative  / Bili Negative / Urobili <2 mg/dL       Blood Small / Protein 100 mg/dL / Leuk Est Small / Nitrite Negative      RBC  / WBC 1 / Hyaline  / Gran  / Sq Epi  / Non Sq Epi 1 / Bacteria Negative    Urine Creatinine 76      [04-24-22 @ 18:13]  Urine Protein 30      [04-24-22 @ 18:13]    Iron 62, TIBC 226, %sat 27      [04-20-22 @ 21:34]  Ferritin 293      [04-20-22 @ 21:34]  PTH -- (Ca --)      [04-24-22 @ 15:58]   217  Vitamin D (25OH) 23.8      [04-24-22 @ 23:11]    HCV 0.21, Nonreact      [04-21-22 @ 09:26]      RADIOLOGY & ADDITIONAL STUDIES:

## 2022-04-28 NOTE — PROGRESS NOTE ADULT - PROBLEM SELECTOR PLAN 7
- C/w levothyroxine

## 2022-04-28 NOTE — PROGRESS NOTE ADULT - PROBLEM SELECTOR PLAN 8
- C/w tamsulosin

## 2022-04-28 NOTE — PROVIDER CONTACT NOTE (MEDICATION) - ASSESSMENT
VS as per flowsheet.
VS as per flowsheet. Pt denies any distress or discomfort.
VS as per flowsheet.

## 2022-04-28 NOTE — PROVIDER CONTACT NOTE (MEDICATION) - BACKGROUND
pt admitted from urologist office for nephrostomy tube placement, in renal failure with severe L hydronephrosis. Procedure done by IR under light sedation and after procedure pt became hypoxic, requiring intubation. Pt now extubated and admitted for HTN. PMH of HLD, hypothyroidism, CKD, BPH, HTN, vertigo, and lymphoma.

## 2022-04-28 NOTE — PROGRESS NOTE ADULT - SUBJECTIVE AND OBJECTIVE BOX
SUBJECTIVE / OVERNIGHT EVENTS:  repeat orthostatics still positive  however, asymptomatic  no cp, no sob, no n/v/d. no abdominal pain.  no headache, no dizziness.         --------------------------------------------------------------------------------------------  LABS:                        9.5    5.44  )-----------( 174      ( 28 Apr 2022 07:34 )             28.9     04-28    140  |  108<H>  |  56<H>  ----------------------------<  75  4.5   |  18<L>  |  4.16<H>    Ca    8.1<L>      28 Apr 2022 07:34  Phos  4.0     04-28  Mg     1.80     04-28        CAPILLARY BLOOD GLUCOSE                RADIOLOGY & ADDITIONAL TESTS:    Imaging Personally Reviewed:  [x] YES  [ ] NO    Consultant(s) Notes Reviewed:  [x] YES  [ ] NO    MEDICATIONS  (STANDING):  atorvastatin 20 milliGRAM(s) Oral at bedtime  cholecalciferol 2000 Unit(s) Oral daily  cloNIDine 0.2 milliGRAM(s) Oral two times a day  heparin   Injectable 5000 Unit(s) SubCutaneous every 8 hours  levothyroxine 25 MICROGram(s) Oral daily  midodrine. 10 milliGRAM(s) Oral three times a day  sodium bicarbonate 650 milliGRAM(s) Oral three times a day  sodium chloride 0.45%. 1000 milliLiter(s) (75 mL/Hr) IV Continuous <Continuous>  tamsulosin 0.4 milliGRAM(s) Oral at bedtime    MEDICATIONS  (PRN):      Care Discussed with Consultants/Other Providers [x] YES  [ ] NO    Vital Signs Last 24 Hrs  T(C): 36.7 (28 Apr 2022 17:04), Max: 36.9 (28 Apr 2022 05:15)  T(F): 98 (28 Apr 2022 17:04), Max: 98.4 (28 Apr 2022 05:15)  HR: 65 (28 Apr 2022 18:17) (65 - 77)  BP: 137/78 (28 Apr 2022 18:17) (137/78 - 181/91)  BP(mean): --  RR: 16 (28 Apr 2022 17:04) (16 - 20)  SpO2: 100% (28 Apr 2022 17:04) (98% - 100%)  I&O's Summary    27 Apr 2022 07:01  -  28 Apr 2022 07:00  --------------------------------------------------------  IN: 754 mL / OUT: 3300 mL / NET: -2546 mL    28 Apr 2022 07:01  -  28 Apr 2022 21:27  --------------------------------------------------------  IN: 950 mL / OUT: 2450 mL / NET: -1500 mL        PHYSICAL EXAM:  GENERAL: NAD, well-developed, comfortable  HEAD:  Atraumatic, Normocephalic  EYES: EOMI, PERRLA, conjunctiva and sclera clear  NECK: Supple, No JVD  CHEST/LUNG: Clear to auscultation bilaterally; No wheeze  HEART: Regular rate and rhythm; No murmurs, rubs, or gallops  ABDOMEN: Soft, Nontender, Nondistended; Bowel sounds present  Neuro: AAOx3, no focal weakness, 5/5 b/l extremity strength  EXTREMITIES:  2+ Peripheral Pulses, No clubbing, cyanosis, or edema  SKIN: No rashes or lesions

## 2022-04-28 NOTE — PROGRESS NOTE ADULT - PROBLEM SELECTOR PLAN 6
- C/w atorvastatin

## 2022-04-28 NOTE — PROGRESS NOTE ADULT - PROBLEM SELECTOR PLAN 2
Likely due to post-renal/obstructive process in setting of retained ureteral stent. SCr 4.5 at [facility] (unclear how long ago). SCr on admission 6.96 on presentation.  - Now s/p L nephrostomy tube by IR  - Monitor SCr and nephrostomy output  - Strict I/Os  - Avoid nephrotoxic agents  - Renally dose meds  - renal eval appreciated  - Cr trending down slowly
Likely due to post-renal/obstructive process in setting of retained ureteral stent. SCr 4.5 at [facility] (unclear how long ago). SCr on admission 6.96 on presentation.  - Now s/p L nephrostomy tube by IR  - Monitor SCr and nephrostomy output  - Strict I/Os  - Avoid nephrotoxic agents  - Renally dose meds  - renal eval appreciated  - Cr trending down slowly
Likely due to post-renal/obstructive process in setting of retained ureteral stent. SCr 4.5 at [facility] (unclear how long ago). SCr on admission 6.96 on presentation.  - Now s/p L nephrostomy tube by IR  - Monitor SCr and nephrostomy output  - Strict I/Os  - Avoid nephrotoxic agents  - Renally dose meds  - renal eval appreciated  - Cr trending down slowly
Likely due to post-renal/obstructive process in setting of retained ureteral stent. SCr 4.5 at [facility] (unclear how long ago). SCr on admission 6.96 on presentation.  - Now s/p L nephrostomy tube by IR  - Monitor SCr and nephrostomy output  - Strict I/Os  - Avoid nephrotoxic agents  - Renally dose meds  - renal eval appreciated  - Cr trending down slowly
Likely due to post-renal/obstructive process in setting of retained ureteral stent. SCr 4.5 at [facility] (unclear how long ago). SCr on admission 6.96 on presentation.  - Now s/p L nephrostomy tube by IR  - Monitor SCr and nephrostomy output  - Strict I/Os  - Avoid nephrotoxic agents  - Renally dose meds  - renal eval appreciated  - Cr trending down slowly
Likely due to post-renal/obstructive process in setting of retained ureteral stent. SCr 4.5 at [facility] (unclear how long ago). SCr on admission 6.96 on presentation.  - Now s/p L nephrostomy tube by IR  - Monitor SCr and nephrostomy output  - Strict I/Os  - Avoid nephrotoxic agents  - Renally dose meds  - renal eval appreciated
Likely due to post-renal/obstructive process in setting of retained ureteral stent. SCr 4.5 at [facility] (unclear how long ago). SCr on admission 6.96 on presentation.  - Now s/p L nephrostomy tube by IR  - Monitor SCr and nephrostomy output  - Strict I/Os  - Avoid nephrotoxic agents  - Renally dose meds  - renal eval called
Likely due to post-renal/obstructive process in setting of retained ureteral stent. SCr 4.5 at [facility] (unclear how long ago). SCr on admission 6.96 on presentation.  - Now s/p L nephrostomy tube by IR  - Monitor SCr and nephrostomy output  - Strict I/Os  - Avoid nephrotoxic agents  - Renally dose meds  - renal eval appreciated  - Cr trending down slowly

## 2022-04-29 ENCOUNTER — TRANSCRIPTION ENCOUNTER (OUTPATIENT)
Age: 72
End: 2022-04-29

## 2022-04-29 VITALS
TEMPERATURE: 98 F | DIASTOLIC BLOOD PRESSURE: 76 MMHG | SYSTOLIC BLOOD PRESSURE: 142 MMHG | RESPIRATION RATE: 18 BRPM | OXYGEN SATURATION: 98 % | HEART RATE: 70 BPM

## 2022-04-29 LAB
ANION GAP SERPL CALC-SCNC: 13 MMOL/L — SIGNIFICANT CHANGE UP (ref 7–14)
BUN SERPL-MCNC: 50 MG/DL — HIGH (ref 7–23)
CALCIUM SERPL-MCNC: 8.1 MG/DL — LOW (ref 8.4–10.5)
CHLORIDE SERPL-SCNC: 107 MMOL/L — SIGNIFICANT CHANGE UP (ref 98–107)
CO2 SERPL-SCNC: 18 MMOL/L — LOW (ref 22–31)
CREAT SERPL-MCNC: 4.07 MG/DL — HIGH (ref 0.5–1.3)
EGFR: 15 ML/MIN/1.73M2 — LOW
GLUCOSE SERPL-MCNC: 102 MG/DL — HIGH (ref 70–99)
HCT VFR BLD CALC: 29.5 % — LOW (ref 39–50)
HGB BLD-MCNC: 9.2 G/DL — LOW (ref 13–17)
MAGNESIUM SERPL-MCNC: 1.7 MG/DL — SIGNIFICANT CHANGE UP (ref 1.6–2.6)
MCHC RBC-ENTMCNC: 30 PG — SIGNIFICANT CHANGE UP (ref 27–34)
MCHC RBC-ENTMCNC: 31.2 GM/DL — LOW (ref 32–36)
MCV RBC AUTO: 96.1 FL — SIGNIFICANT CHANGE UP (ref 80–100)
NRBC # BLD: 0 /100 WBCS — SIGNIFICANT CHANGE UP
NRBC # FLD: 0 K/UL — SIGNIFICANT CHANGE UP
PHOSPHATE SERPL-MCNC: 4.6 MG/DL — HIGH (ref 2.5–4.5)
PLATELET # BLD AUTO: 174 K/UL — SIGNIFICANT CHANGE UP (ref 150–400)
POTASSIUM SERPL-MCNC: 4.4 MMOL/L — SIGNIFICANT CHANGE UP (ref 3.5–5.3)
POTASSIUM SERPL-SCNC: 4.4 MMOL/L — SIGNIFICANT CHANGE UP (ref 3.5–5.3)
RBC # BLD: 3.07 M/UL — LOW (ref 4.2–5.8)
RBC # FLD: 15.2 % — HIGH (ref 10.3–14.5)
SODIUM SERPL-SCNC: 138 MMOL/L — SIGNIFICANT CHANGE UP (ref 135–145)
WBC # BLD: 5.3 K/UL — SIGNIFICANT CHANGE UP (ref 3.8–10.5)
WBC # FLD AUTO: 5.3 K/UL — SIGNIFICANT CHANGE UP (ref 3.8–10.5)

## 2022-04-29 RX ORDER — LABETALOL HCL 100 MG
1 TABLET ORAL
Qty: 0 | Refills: 0 | DISCHARGE

## 2022-04-29 RX ORDER — CHOLECALCIFEROL (VITAMIN D3) 125 MCG
2000 CAPSULE ORAL
Qty: 0 | Refills: 0 | DISCHARGE
Start: 2022-04-29

## 2022-04-29 RX ORDER — SODIUM BICARBONATE 1 MEQ/ML
1 SYRINGE (ML) INTRAVENOUS
Qty: 42 | Refills: 0
Start: 2022-04-29 | End: 2022-05-12

## 2022-04-29 RX ORDER — MIDODRINE HYDROCHLORIDE 2.5 MG/1
1 TABLET ORAL
Qty: 90 | Refills: 0
Start: 2022-04-29 | End: 2022-05-28

## 2022-04-29 RX ORDER — NIFEDIPINE 30 MG
1 TABLET, EXTENDED RELEASE 24 HR ORAL
Qty: 0 | Refills: 0 | DISCHARGE

## 2022-04-29 RX ADMIN — Medication 25 MICROGRAM(S): at 05:27

## 2022-04-29 RX ADMIN — SODIUM CHLORIDE 75 MILLILITER(S): 9 INJECTION, SOLUTION INTRAVENOUS at 01:45

## 2022-04-29 RX ADMIN — Medication 2000 UNIT(S): at 11:30

## 2022-04-29 RX ADMIN — HEPARIN SODIUM 5000 UNIT(S): 5000 INJECTION INTRAVENOUS; SUBCUTANEOUS at 05:33

## 2022-04-29 RX ADMIN — Medication 0.2 MILLIGRAM(S): at 05:31

## 2022-04-29 RX ADMIN — Medication 650 MILLIGRAM(S): at 05:29

## 2022-04-29 NOTE — PROVIDER CONTACT NOTE (OTHER) - DATE AND TIME:
28-Apr-2022 08:53
28-Apr-2022 08:53
27-Apr-2022 22:00
28-Apr-2022 17:06
29-Apr-2022 08:57
21-Apr-2022 18:19
26-Apr-2022 17:00
27-Apr-2022 09:00
28-Apr-2022 22:15
24-Apr-2022 17:00

## 2022-04-29 NOTE — PROVIDER CONTACT NOTE (OTHER) - NAME OF MD/NP/PA/DO NOTIFIED:
ISELA Cohen
ISELA Singh
ISELA Cabrera
ISELA Cabrera
ISELA benites
ISELA Ortega
Ashley Moses, [id number]
YUNG Ortega
ISELA Cohen
ISELA Duran

## 2022-04-29 NOTE — PROGRESS NOTE ADULT - PROVIDER SPECIALTY LIST ADULT
Cardiology
Cardiology
Internal Medicine
Intervent Radiology
Nephrology
Cardiology
Nephrology
Nephrology
Intervent Radiology
Nephrology
Internal Medicine

## 2022-04-29 NOTE — DISCHARGE NOTE NURSING/CASE MANAGEMENT/SOCIAL WORK - PATIENT PORTAL LINK FT
You can access the FollowMyHealth Patient Portal offered by [facility] by registering at the following website: [email or web address]. By joining [facility]’s FollowMyHealth portal, you will also be able to view your health information using other applications (apps) compatible with our system.

## 2022-04-29 NOTE — PROGRESS NOTE ADULT - ASSESSMENT
70 yo M hx of HTN, HLD, CKD, BPH, R eye blindness, lymphoma, vertigo, hypothyroidism, b/l ureteral stents (placed 15 years ago), presenting to ED from urologist office with worsen L hydro and renal function s/p urgent nephrostomy tube placement by IR.  nephrology consulted for worsen renal failure.    Renal failure  RUTH likely sec to obstruction, now s/p urgent nephrostomy tube placement  renal function is improving  Has underlying CKD stage 4  unclear baseline, in 07/21 Scr 2.5  Continue IVF-1/2 NS @ 75cc/hr. monitor urine output and fluid status.   still have high urinary output  Monitor I/O  f/u /IR  avoid nephrotoxic agents  monitor bmp.  pt will need further nephrology follow up. Patient advised to follow up with Dr. Skinner in 1-2 weeks after discharge    HTN  bp fluctuating greatly  orthostatic-follow up cardiology   on clonidine and midodrine   monitor    proteinuria  100 protein in urine  spot urine p/c ratio 0.395  monitor    hypocalcemia  elevated PTH  low vit d 25  start vit d supplement  monitor    hyperphos  renal diet  better  monitor    anemia   not iron deficiency   monitor    acidosis  Non AG  start oral bicarb 650 tid  monitor

## 2022-04-29 NOTE — PROVIDER CONTACT NOTE (OTHER) - REASON
/87
BP /94 at this time.
Patient BP is 198/93
Manual /90, pt asymptomatic. pt denies any signs of distress or discomfort.
Patient orthostatic positive and symptomatic
orthos
BP is high
Patient /78, asymptomatic
Orthostatic positive
Patient /91

## 2022-04-29 NOTE — PROVIDER CONTACT NOTE (OTHER) - ACTION/TREATMENT ORDERED:
Provider notified, informed to still administer dose of midodrine.  Provider put in provider to RN to administer dose. called provider to confirm order once again. dose administered for /87
ISELA Cohen aware
As per ISELA Duran plan is to reassess BP in 1 hour. Evening dose of clonidine held.
As per PA, will follow up with attending in terms of medication adjust and continue to monitor at this time
Provider aware- stated to hold midodrine due to laying BP of 184/100
As per PA, verbally-hold midodrine at this time
As per PA, administer PM BP meds now and repeat BP in 1-2 hours
ISELA Falcon made aware, Pending any other further recommendations
PA Vic aware
PA made aware, pending further orders.

## 2022-04-29 NOTE — PROVIDER CONTACT NOTE (OTHER) - SITUATION
BP /94 at this time.
Provider notified during rounds of orthostatic vitals
Patient orthostatic positive and symptomatic
BP is high
Patient BP is 198/93
Patient orthostatic positive and symptomatic
Manual /90, pt asymptomatic. pt denies any signs of distress or discomfort.
Orthostatic positive ( see flow sheet)
Patient /91
pt /87. PM dose of midodrine ordered.

## 2022-04-29 NOTE — PROGRESS NOTE ADULT - SUBJECTIVE AND OBJECTIVE BOX
DATE OF SERVICE: 04-29-22 @ 09:13    Subjective: Patient seen and examined. No new events except as noted.     SUBJECTIVE/ROS:  feels ok         MEDICATIONS:  MEDICATIONS  (STANDING):  atorvastatin 20 milliGRAM(s) Oral at bedtime  cholecalciferol 2000 Unit(s) Oral daily  cloNIDine 0.2 milliGRAM(s) Oral two times a day  heparin   Injectable 5000 Unit(s) SubCutaneous every 8 hours  levothyroxine 25 MICROGram(s) Oral daily  midodrine. 10 milliGRAM(s) Oral three times a day  sodium bicarbonate 650 milliGRAM(s) Oral three times a day  sodium chloride 0.45%. 1000 milliLiter(s) (75 mL/Hr) IV Continuous <Continuous>  tamsulosin 0.4 milliGRAM(s) Oral at bedtime      PHYSICAL EXAM:  T(C): 37.1 (04-29-22 @ 08:53), Max: 37.1 (04-29-22 @ 08:53)  HR: 74 (04-29-22 @ 08:53) (65 - 78)  BP: 140/78 (04-29-22 @ 08:53) (137/78 - 181/91)  RR: 18 (04-29-22 @ 08:53) (16 - 20)  SpO2: 100% (04-29-22 @ 08:53) (99% - 100%)  Wt(kg): --  I&O's Summary    28 Apr 2022 07:01  -  29 Apr 2022 07:00  --------------------------------------------------------  IN: 950 mL / OUT: 3650 mL / NET: -2700 mL            JVP: Normal  Neck: supple  Lung: clear   CV: S1 S2 , Murmur:  Abd: soft  Ext: No edema  neuro: Awake / alert  Psych: flat affect  Skin: normal``    LABS/DATA:    CARDIAC MARKERS:                                9.2    5.30  )-----------( 174      ( 29 Apr 2022 07:54 )             29.5     04-29    138  |  107  |  50<H>  ----------------------------<  102<H>  4.4   |  18<L>  |  4.07<H>    Ca    8.1<L>      29 Apr 2022 07:54  Phos  4.6     04-29  Mg     1.70     04-29      proBNP:   Lipid Profile:   HgA1c:   TSH:     TELE:  EKG:

## 2022-04-29 NOTE — PROVIDER CONTACT NOTE (OTHER) - ASSESSMENT
Patient /91, asymptomatic. Denies any other distress or discomfort at this time
Patient is AxOx4. All VS stable. Patient is asymptomatic. Patient denies any chest pain and or SOB.
Pt is A&Ox4 at baseline. and stated " I am not dizzy" when sitting orthostatic was taken.
Pt is A&Ox4 at baseline. asymptomatic
Pt unable to stand for rest of orthos. Pt got dizzy.
see VS flow sheet
VS as per flowsheet.
Patient is AxOx4. All VS stable. Patient is asymptomatic. Patient denies any chest pain and or SOB.
While lying patient /98 HR 77, while sitting BP 93/58 HR 77 and patient unable to tolerate standing as dizziness was increasing. From standing to sitting BP was 84/48 HR 80
Patient /78. Patient asymptomatic. Denies any distress or discomfort at this time

## 2022-04-29 NOTE — DISCHARGE NOTE NURSING/CASE MANAGEMENT/SOCIAL WORK - NSDCFUADDAPPT_GEN_ALL_CORE_FT
Follow up with PCP within 1-2 weeks of discharge. If you are in need of a general medicine physician and post-discharge medical follow-up for further care/recommendations you may contact the [facility] for an appointment at [phone].     Follow up with urology in 1-2 weeks. You may follow up at [facility] located at [address] [place] [address], Suite [facility], [place] [address]. Call [phone] for an appointment.     Follow up with the interventional radiology within 4-6 weeks of discharge. You may call [phone] for an appointment.     Follow up with cardiology within 1-2 weeks of discharge. If you are in need of a general cardiologist after discharge, you may follow up with Dr. Goodson or contact the [facility] for an appointment at [phone].

## 2022-04-29 NOTE — PROGRESS NOTE ADULT - ASSESSMENT
Orthostatic hypotension   severe autonomic dysfunction   likely chronic   monitor orthostatic vitals  needs aggressive PT   cont midodrine and clonidine   cont midodrine 10 tid   this will likely take a while to improve , needs aggressive PT , rehabilitation at rehab center   we can consider inpt amyloidosis work up     HTN  as above     RUTH  as per renal     Diastolic CHF  mild   grade I   stable

## 2022-04-29 NOTE — DISCHARGE NOTE NURSING/CASE MANAGEMENT/SOCIAL WORK - NSDCPEFALRISK_GEN_ALL_CORE
For information on Fall & Injury Prevention, visit: [email or web address] OR  [email or web address] OR  https://www.cdc.gov/steadi/patient.html

## 2022-04-29 NOTE — PROGRESS NOTE ADULT - REASON FOR ADMISSION
Hypoxic respiratory failure s/p sedation

## 2022-04-29 NOTE — PROGRESS NOTE ADULT - SUBJECTIVE AND OBJECTIVE BOX
[facility]   MD KAWSI SUH MD KRISTINE SOLTANPOUR, ISELA ANTONY    TEL:  OFFICE: [phone]  From 5pm-7am Answering Service [phone]    -- RENAL FOLLOW UP NOTE ---Date of Service 04-29-22 @ 10:18    Patient is a 71y old  Male who presents with a chief complaint of Hypoxic respiratory failure s/p sedation (29 Apr 2022 09:13)      Patient seen and examined at bedside. No chest pain/sob    VITALS:  T(F): 98.8 (04-29-22 @ 08:53), Max: 98.8 (04-29-22 @ 08:53)  HR: 74 (04-29-22 @ 08:53)  BP: 140/78 (04-29-22 @ 08:53)  RR: 18 (04-29-22 @ 08:53)  SpO2: 100% (04-29-22 @ 08:53)  Wt(kg): --    04-28 @ 07:01 - 04-29 @ 07:00  --------------------------------------------------------  IN: 950 mL / OUT: 3650 mL / NET: -2700 mL    04-29 @ 07:01  -  04-29 @ 10:18  --------------------------------------------------------  IN: 0 mL / OUT: 300 mL / NET: -300 mL          PHYSICAL EXAM:  Constitutional: NAD  Neck: No JVD  Respiratory: CTAB, no wheezes, rales or rhonchi  Cardiovascular: S1, S2, RRR  Gastrointestinal: BS+, soft, NT/ND +left NT  Extremities: No peripheral edema    Hospital Medications:   MEDICATIONS  (STANDING):  atorvastatin 20 milliGRAM(s) Oral at bedtime  cholecalciferol 2000 Unit(s) Oral daily  cloNIDine 0.2 milliGRAM(s) Oral two times a day  heparin   Injectable 5000 Unit(s) SubCutaneous every 8 hours  levothyroxine 25 MICROGram(s) Oral daily  midodrine. 10 milliGRAM(s) Oral three times a day  sodium bicarbonate 650 milliGRAM(s) Oral three times a day  sodium chloride 0.45%. 1000 milliLiter(s) (75 mL/Hr) IV Continuous <Continuous>  tamsulosin 0.4 milliGRAM(s) Oral at bedtime      LABS:  04-29    138  |  107  |  50<H>  ----------------------------<  102<H>  4.4   |  18<L>  |  4.07<H>    Ca    8.1<L>      29 Apr 2022 07:54  Phos  4.6     04-29  Mg     1.70     04-29      Creatinine Trend: 4.07 <--, 4.16 <--, 4.26 <--, 4.29 <--, 4.53 <--, 4.91 <--, 5.58 <--    Phosphorus Level, Serum: 4.6 mg/dL (04-29 @ 07:54)                              9.2    5.30  )-----------( 174      ( 29 Apr 2022 07:54 )             29.5     Urine Studies:  Urinalysis - [04-20-22 @ 14:37]      Color Light Yellow / Appearance Clear / SG 1.018 / pH 6.5      Gluc Negative / Ketone Negative  / Bili Negative / Urobili <2 mg/dL       Blood Small / Protein 100 mg/dL / Leuk Est Small / Nitrite Negative      RBC  / WBC 1 / Hyaline  / Gran  / Sq Epi  / Non Sq Epi 1 / Bacteria Negative    Urine Creatinine 76      [04-24-22 @ 18:13]  Urine Protein 30      [04-24-22 @ 18:13]    Iron 62, TIBC 226, %sat 27      [04-20-22 @ 21:34]  Ferritin 293      [04-20-22 @ 21:34]  PTH -- (Ca --)      [04-24-22 @ 15:58]   217  Vitamin D (25OH) 23.8      [04-24-22 @ 23:11]    HCV 0.21, Nonreact      [04-21-22 @ 09:26]      RADIOLOGY & ADDITIONAL STUDIES:   [facility] [place] [facility]   MD KWASI SUH MD KRISTINE SOLTANPOUR, DO ANGELA WONG, PA    TEL:  OFFICE: [phone]  From 5pm-7am Answering Service [phone]    -- RENAL FOLLOW UP NOTE ---Date of Service 04-29-22 @ 10:18    Patient is a 71y old  Male who presents with a chief complaint of Hypoxic respiratory failure s/p sedation (29 Apr 2022 09:13)      Patient seen and examined at bedside. No chest pain/sob. Leaving today    VITALS:  T(F): 98.8 (04-29-22 @ 08:53), Max: 98.8 (04-29-22 @ 08:53)  HR: 74 (04-29-22 @ 08:53)  BP: 140/78 (04-29-22 @ 08:53)  RR: 18 (04-29-22 @ 08:53)  SpO2: 100% (04-29-22 @ 08:53)  Wt(kg): --    04-28 @ 07:01  -  04-29 @ 07:00  --------------------------------------------------------  IN: 950 mL / OUT: 3650 mL / NET: -2700 mL    04-29 @ 07:01  -  04-29 @ 10:18  --------------------------------------------------------  IN: 0 mL / OUT: 300 mL / NET: -300 mL          PHYSICAL EXAM:  Constitutional: NAD  Neck: No JVD  Respiratory: CTAB, no wheezes, rales or rhonchi  Cardiovascular: S1, S2, RRR  Gastrointestinal: BS+, soft, NT/ND +left NT  Extremities: No peripheral edema    Hospital Medications:   MEDICATIONS  (STANDING):  atorvastatin 20 milliGRAM(s) Oral at bedtime  cholecalciferol 2000 Unit(s) Oral daily  cloNIDine 0.2 milliGRAM(s) Oral two times a day  heparin   Injectable 5000 Unit(s) SubCutaneous every 8 hours  levothyroxine 25 MICROGram(s) Oral daily  midodrine. 10 milliGRAM(s) Oral three times a day  sodium bicarbonate 650 milliGRAM(s) Oral three times a day  sodium chloride 0.45%. 1000 milliLiter(s) (75 mL/Hr) IV Continuous <Continuous>  tamsulosin 0.4 milliGRAM(s) Oral at bedtime      LABS:  04-29    138  |  107  |  50<H>  ----------------------------<  102<H>  4.4   |  18<L>  |  4.07<H>    Ca    8.1<L>      29 Apr 2022 07:54  Phos  4.6     04-29  Mg     1.70     04-29      Creatinine Trend: 4.07 <--, 4.16 <--, 4.26 <--, 4.29 <--, 4.53 <--, 4.91 <--, 5.58 <--    Phosphorus Level, Serum: 4.6 mg/dL (04-29 @ 07:54)                              9.2    5.30  )-----------( 174      ( 29 Apr 2022 07:54 )             29.5     Urine Studies:  Urinalysis - [04-20-22 @ 14:37]      Color Light Yellow / Appearance Clear / SG 1.018 / pH 6.5      Gluc Negative / Ketone Negative  / Bili Negative / Urobili <2 mg/dL       Blood Small / Protein 100 mg/dL / Leuk Est Small / Nitrite Negative      RBC  / WBC 1 / Hyaline  / Gran  / Sq Epi  / Non Sq Epi 1 / Bacteria Negative    Urine Creatinine 76      [04-24-22 @ 18:13]  Urine Protein 30      [04-24-22 @ 18:13]    Iron 62, TIBC 226, %sat 27      [04-20-22 @ 21:34]  Ferritin 293      [04-20-22 @ 21:34]  PTH -- (Ca --)      [04-24-22 @ 15:58]   217  Vitamin D (25OH) 23.8      [04-24-22 @ 23:11]    HCV 0.21, Nonreact      [04-21-22 @ 09:26]      RADIOLOGY & ADDITIONAL STUDIES:

## 2022-04-29 NOTE — PROVIDER CONTACT NOTE (OTHER) - RECOMMENDATIONS
Standing orthostatic not attempted due to drop. Standing orthostatic not attempted due to drop.  As per PA Vic , attempted standing orthos,- could not tolerate. patietn got dizzy.

## 2022-04-29 NOTE — PROVIDER CONTACT NOTE (OTHER) - BACKGROUND
Patient admitted with essential hypertension. Hxof HTN, HLD, CKD, and hypothyroidism
pt admitted to ED from urologist for nephrostomy tube, after placement pt became hypoxic requiring intubation. pt now extubated. Admitted with HTN. PMH of HTN, BPH, HLD, hypothyroidism, CKD, lymphoma.
71 M hx of HTN, HLD, CKD, lymphoma, vertigo, hypothyroidism, B/L ureteral stents (placed 15 years ago), presenting to ED from urologist office for nephrostomy tube placement
Patient admitted with essential hypertension. Hxof HTN, HLD, CKD, and hypothyroidism
Patient admitted for Essential hypertension. Hx of BPH, HLD, CKD, HTN, vertigo, lymphoma, & hypothyroidism
Patient admitted for Essential hypertension. Hx of BPH, HLD, CKD, HTN, vertigo, lymphoma, & hypothyroidism
pt admitted with nephrostomy tube and essential hypertension. pt is orthostatic positive
71 M hx of HTN, HLD, CKD, lymphoma, vertigo, hypothyroidism, B/L ureteral stents (placed 15 years ago), presenting to ED
Patient admitted for Essential hypertension. Hx of BPH, HLD, CKD, HTN, vertigo, lymphoma, & hypothyroidism
Patient admitted with essential hypertension. Hxof HTN, HLD, CKD, and hypothyroidism

## 2022-05-09 PROBLEM — N40.0 BENIGN PROSTATIC HYPERPLASIA WITHOUT LOWER URINARY TRACT SYMPTOMS: Chronic | Status: ACTIVE | Noted: 2022-04-21

## 2022-05-09 PROBLEM — N18.9 CHRONIC KIDNEY DISEASE, UNSPECIFIED: Chronic | Status: ACTIVE | Noted: 2022-04-20

## 2022-05-09 PROBLEM — I10 ESSENTIAL (PRIMARY) HYPERTENSION: Chronic | Status: ACTIVE | Noted: 2022-04-20

## 2022-05-09 PROBLEM — R42 DIZZINESS AND GIDDINESS: Chronic | Status: ACTIVE | Noted: 2022-04-20

## 2022-05-09 PROBLEM — E03.9 HYPOTHYROIDISM, UNSPECIFIED: Chronic | Status: ACTIVE | Noted: 2022-04-20

## 2022-05-09 PROBLEM — E78.5 HYPERLIPIDEMIA, UNSPECIFIED: Chronic | Status: ACTIVE | Noted: 2022-04-21

## 2022-05-13 ENCOUNTER — APPOINTMENT (OUTPATIENT)
Dept: UROLOGY | Facility: CLINIC | Age: 72
End: 2022-05-13
Payer: MEDICARE

## 2022-05-13 PROCEDURE — 99214 OFFICE O/P EST MOD 30 MIN: CPT

## 2022-05-13 NOTE — HISTORY OF PRESENT ILLNESS
[FreeTextEntry1] : Patient is here for follow-up\par He is complaining of some dysuria, onset this morning\par \par He underwent successful left nephrostomy tube insertion and brief hospitalization for worsening of his CKD.\par \par CT scan images reviewed\par Retained encrusted fragment of left ureteral stent visualized, right ureter stent in place\par \par We discussed treatment options\par He will undergo left-sided ureteroscopy possible percutaneous antegrade approach to remove retained encrusted left ureteral stent fragments.  At that time we will place a new left ureteral stents and remove the left nephrostomy tube, also we will change the right ureteral stent.\par \par Going forward after the above surgery, patient and daughter were made to understand that he will need to have the stents changed every 3 months to prevent a repeat of the most recent events with stent failure\par \par Also going forward, due to his chronic kidney disease, the goal is renal preservation by keeping the kidneys unobstructed from his underlying ureteral stricture/obstruction.  Perhaps at some point in the future to revisit the possibility of being able to remove the stents.\par \par Urine culture sent for presumed cystitis\par Cipro 250 mg twice a day for 5 days for presumed cystitis\par

## 2022-05-31 ENCOUNTER — OUTPATIENT (OUTPATIENT)
Dept: OUTPATIENT SERVICES | Facility: HOSPITAL | Age: 72
LOS: 1 days | End: 2022-05-31
Payer: COMMERCIAL

## 2022-05-31 VITALS
TEMPERATURE: 99 F | HEIGHT: 70 IN | RESPIRATION RATE: 18 BRPM | WEIGHT: 201.94 LBS | HEART RATE: 56 BPM | DIASTOLIC BLOOD PRESSURE: 86 MMHG | OXYGEN SATURATION: 100 % | SYSTOLIC BLOOD PRESSURE: 159 MMHG

## 2022-05-31 DIAGNOSIS — Z01.818 ENCOUNTER FOR OTHER PREPROCEDURAL EXAMINATION: ICD-10-CM

## 2022-05-31 DIAGNOSIS — N13.30 UNSPECIFIED HYDRONEPHROSIS: ICD-10-CM

## 2022-05-31 DIAGNOSIS — Z96.0 PRESENCE OF UROGENITAL IMPLANTS: ICD-10-CM

## 2022-05-31 DIAGNOSIS — Z98.890 OTHER SPECIFIED POSTPROCEDURAL STATES: Chronic | ICD-10-CM

## 2022-05-31 DIAGNOSIS — N18.9 CHRONIC KIDNEY DISEASE, UNSPECIFIED: ICD-10-CM

## 2022-05-31 LAB
ANION GAP SERPL CALC-SCNC: 15 MMOL/L — SIGNIFICANT CHANGE UP (ref 5–17)
BLD GP AB SCN SERPL QL: NEGATIVE — SIGNIFICANT CHANGE UP
BUN SERPL-MCNC: 41 MG/DL — HIGH (ref 7–23)
CALCIUM SERPL-MCNC: 8.3 MG/DL — LOW (ref 8.4–10.5)
CHLORIDE SERPL-SCNC: 104 MMOL/L — SIGNIFICANT CHANGE UP (ref 96–108)
CO2 SERPL-SCNC: 24 MMOL/L — SIGNIFICANT CHANGE UP (ref 22–31)
CREAT SERPL-MCNC: 4.44 MG/DL — HIGH (ref 0.5–1.3)
EGFR: 13 ML/MIN/1.73M2 — LOW
GLUCOSE SERPL-MCNC: 185 MG/DL — HIGH (ref 70–99)
HCT VFR BLD CALC: 31.3 % — LOW (ref 39–50)
HGB BLD-MCNC: 9.7 G/DL — LOW (ref 13–17)
MCHC RBC-ENTMCNC: 29 PG — SIGNIFICANT CHANGE UP (ref 27–34)
MCHC RBC-ENTMCNC: 31 GM/DL — LOW (ref 32–36)
MCV RBC AUTO: 93.7 FL — SIGNIFICANT CHANGE UP (ref 80–100)
NRBC # BLD: 0 /100 WBCS — SIGNIFICANT CHANGE UP (ref 0–0)
PLATELET # BLD AUTO: 253 K/UL — SIGNIFICANT CHANGE UP (ref 150–400)
POTASSIUM SERPL-MCNC: 3.7 MMOL/L — SIGNIFICANT CHANGE UP (ref 3.5–5.3)
POTASSIUM SERPL-SCNC: 3.7 MMOL/L — SIGNIFICANT CHANGE UP (ref 3.5–5.3)
RBC # BLD: 3.34 M/UL — LOW (ref 4.2–5.8)
RBC # FLD: 13.2 % — SIGNIFICANT CHANGE UP (ref 10.3–14.5)
RH IG SCN BLD-IMP: POSITIVE — SIGNIFICANT CHANGE UP
SODIUM SERPL-SCNC: 143 MMOL/L — SIGNIFICANT CHANGE UP (ref 135–145)
WBC # BLD: 6.26 K/UL — SIGNIFICANT CHANGE UP (ref 3.8–10.5)
WBC # FLD AUTO: 6.26 K/UL — SIGNIFICANT CHANGE UP (ref 3.8–10.5)

## 2022-05-31 PROCEDURE — 87077 CULTURE AEROBIC IDENTIFY: CPT

## 2022-05-31 PROCEDURE — 85027 COMPLETE CBC AUTOMATED: CPT

## 2022-05-31 PROCEDURE — 80048 BASIC METABOLIC PNL TOTAL CA: CPT

## 2022-05-31 PROCEDURE — 87186 SC STD MICRODIL/AGAR DIL: CPT

## 2022-05-31 PROCEDURE — 86850 RBC ANTIBODY SCREEN: CPT

## 2022-05-31 PROCEDURE — 86900 BLOOD TYPING SEROLOGIC ABO: CPT

## 2022-05-31 PROCEDURE — 86901 BLOOD TYPING SEROLOGIC RH(D): CPT

## 2022-05-31 PROCEDURE — 87086 URINE CULTURE/COLONY COUNT: CPT

## 2022-05-31 PROCEDURE — G0463: CPT

## 2022-05-31 NOTE — H&P PST ADULT - NSICDXPASTMEDICALHX_GEN_ALL_CORE_FT
PAST MEDICAL HISTORY:  BPH (benign prostatic hyperplasia)     Chronic kidney disease, unspecified CKD stage     Hyperlipidemia     Hypertension     Hypothyroidism     Lymphoma chemo 2014    Vertigo      PAST MEDICAL HISTORY:  BPH (benign prostatic hyperplasia)     Chronic kidney disease, unspecified CKD stage     H/O orthostatic hypotension     Hyperlipidemia     Hypertension     Hypothyroidism     Lymphoma chemo 2014    Vertigo

## 2022-05-31 NOTE — H&P PST ADULT - HISTORY OF PRESENT ILLNESS
This is a 72 y/o male PMH lymphoma, S/P chemo via right chest mediport 2014, remains in place, chronic diastolic CHF, CKD, hypothyroidism, S/P bilateral ureteral stents placement, right has been exchanged, left encrusted,  orthostatic hypotension while admitted to [facility], had cardiology consult with Dr. Goodson, urine culture 5/17/22 positive for enterococcus faecalis, treated with Cipro, S/P urgent left nephrostomy tube placement for left hydronephrosis.  Presents today for  This is a 70 y/o male PMH lymphoma, S/P chemo via right chest mediport 2014, mediport remains in place, chronic diastolic CHF, CKD, hypothyroidism, S/P bilateral ureteral stents placement years ago, right has been exchanged, left stent retained and encrusted, orthostatic hypotension while admitted to [facility] for RUTH,  left hydronephrosis, had cardiology consult with Dr. Goodson, is on midodrine.  The patient also had acute respiratory failure after sedation for urgent left nephrostomy placement, which the patient recovered from quickly and was not thought to be related to primary pulmonary issue, as per IR note  The patient had  urine culture 5/17/22 positive for enterococcus faecalis, treated with Cipro.  Presents today for cystoscopy, left ureteroscopy, removal of retained encrusted ureteral stent fragment, possible percutaneous antergrade approach, bilateral ureteral stnet exchange.    No H/O COVID infection, COVID swab scheduled for 6/19/22 at [facility] This is a 72 y/o male PMH lymphoma, S/P chemo via right chest mediport 2014, mediport remains in place, chronic diastolic CHF, CKD, hypothyroidism, S/P bilateral ureteral stents placement years ago, right has been exchanged, left stent retained and encrusted, orthostatic hypotension while admitted to [facility] for RUTH, left hydronephrosis April 2022, had cardiology consult with Dr. Goodson, is on midodrine.  The patient also had acute respiratory failure after sedation for urgent left nephrostomy placement, which the patient recovered from quickly and was not thought to be related to primary pulmonary issue, as per IR note  The patient had  urine culture 5/17/22 positive for enterococcus faecalis, treated with Cipro.  Presents today for cystoscopy, left ureteroscopy, removal of retained encrusted ureteral stent fragment, possible percutaneous antergrade approach, bilateral ureteral stnet exchange.    No H/O COVID infection, [facility] This is a 72 y/o male PMH lymphoma, S/P chemo via right chest mediport 2014, mediport remains in place, chronic diastolic CHF, CKD stage 4, hypothyroidism, S/P bilateral ureteral stents placement 15 years ago.  The patient was sent to the [facility] ER in [place] from his urologist's office with severe left hydronephrosis and RUTH,  the right stent was exchanged without difficulty, the left stent is retained and encrusted,.  The patient became hypoxic after sedation for procedure requiring intubation; however, recovered quickly and was not thought to be due to a primary pulmonary issue.  The patient had orthostatic hypotension while admitted to [facility] in April 2022, had cardiology consult with Dr. Goodson, is on midodrine.  The patient had  urine culture 5/17/22 positive for enterococcus faecalis, treated with Cipro.  Presents today for cystoscopy, left ureteroscopy, removal of retained encrusted ureteral stent fragment, possible percutaneous antegrade approach, bilateral ureteral stent exchange.    No H/O COVID infection, COVID swab scheduled for 6/19/22 at [facility]

## 2022-05-31 NOTE — H&P PST ADULT - ATTENDING COMMENTS
Patient seen and examined  Systolic BP high 180's to over 200  Asymptomatic  Canceled OR for today  Will admit for BP management and plan to return to OR in 2 days

## 2022-05-31 NOTE — H&P PST ADULT - FALL HARM RISK - HARM RISK INTERVENTIONS

## 2022-05-31 NOTE — H&P PST ADULT - FUNCTIONAL ASSESSMENT - BASIC MOBILITY ASSESSMENT TYPE
Assessment & Plan:    1. Upper respiratory infection   Diffuse wheezing, concern for underline asthma   Diffuses wheezes on exam, tight, tachypnea, but well appearing, interactive. Family history of asthma, but patient has never been diagnosed or hospitalized in the past. Likely some underline asthma, exacerbate by viral illness.   - CXR->no obvious infiltrate or consolidation  - Prednisolone 1mg/kg daily x 5 days  - Albuterol nebulizer  - DME for nebulizer machine  - ER/Urgent care precautions given  - COVID swab  - Follow up in 1 week     Subjective   Kevin is a 3 year old who is otherwise healthy, who presents with 3-4 days cough, runny nose, congestion, subjective fevers. Grandma reports symptoms started a few days ago and is getting worse. She reports some fevers, more noticeable at night. She noticed louder breath and with his ongoing congested cough, thought he should be seen.   Has done children's tylenol at night with minimal effect.   Brother also sick at home with similar symptoms.   Family history of asthma.   Never been hospitalized.     History obtained from maternal grandmother who reports she has been his guardian for 1 year.     Review of Systems   Constitutional, eye, ENT, skin, respiratory, cardiac, and GI are normal except as otherwise noted.      Objective    /60   Pulse 98   Temp 98.9  F (37.2  C)   Resp 16   Wt 15.1 kg (33 lb 3.2 oz)   62 %ile (Z= 0.32) based on CDC (Boys, 2-20 Years) weight-for-age data using vitals from 4/22/2022.     Physical Exam   GENERAL: Active, alert, in no acute distress.  SKIN: Clear. No significant rash, abnormal pigmentation or lesions  HEAD: Normocephalic.  EYES:  No discharge or erythema. Normal pupils and EOM.  EARS: Normal canals. Tympanic membranes are normal; gray and translucent.  LEFT EAR: Cerumen blocking part of TM  NOSE: congested and clear rhinorrhea   MOUTH/THROAT: Clear. No oral lesions. Teeth intact without obvious abnormalities.  NECK:  Supple, no masses.  LYMPH NODES: No adenopathy  LUNGS: Diffuse expiratory wheezing with notable tachypnea, RR 40, and mild use of abdominal muscles. Decreased air exchange throughout.   HEART: Regular rhythm. Normal S1/S2. No murmurs.  ABDOMEN: Soft, non-tender, not distended    I precepted today with Dr. Heredia.     Misty Hilario MD PGY-1        DME (Durable Medical Equipment) Orders and Documentation  Orders Placed This Encounter   Procedures     Nebulizer and Supplies Order for DME - ONLY FOR DME     Nebulizer and Supplies Order      The patient was assessed and it was determined the patient is in need of the following listed DME Supplies/Equipment. Please complete supporting documentation below to demonstrate medical necessity.      Nebulizer Documentation  The patient was seen 04/22/2022. After assessment, the patient will need to be treated with ongoing nebulizer for treatment/management of wheezing and tachypnea.                Admission

## 2022-05-31 NOTE — H&P PST ADULT - PROBLEM SELECTOR PLAN 1
Cystoscopy, left ureteroscopy, removal of retained encrusted ureteral stent fragment, possible percutaneous antegrade approach, bilateral ureteral stent exchange. Cystoscopy, left ureteroscopy, removal of retained encrusted ureteral stent fragment, possible percutaneous antegrade approach, bilateral ureteral stent exchange.  Will continue aspirin

## 2022-06-18 ENCOUNTER — OUTPATIENT (OUTPATIENT)
Dept: OUTPATIENT SERVICES | Facility: HOSPITAL | Age: 72
LOS: 1 days | End: 2022-06-18
Payer: COMMERCIAL

## 2022-06-18 DIAGNOSIS — Z11.52 ENCOUNTER FOR SCREENING FOR COVID-19: ICD-10-CM

## 2022-06-18 DIAGNOSIS — Z98.890 OTHER SPECIFIED POSTPROCEDURAL STATES: Chronic | ICD-10-CM

## 2022-06-18 LAB — SARS-COV-2 RNA SPEC QL NAA+PROBE: SIGNIFICANT CHANGE UP

## 2022-06-18 PROCEDURE — C9803: CPT

## 2022-06-18 PROCEDURE — U0005: CPT

## 2022-06-18 PROCEDURE — U0003: CPT

## 2022-06-21 ENCOUNTER — INPATIENT (INPATIENT)
Facility: HOSPITAL | Age: 72
LOS: 2 days | Discharge: HOME | DRG: 660 | End: 2022-06-24
Attending: UROLOGY | Admitting: UROLOGY
Payer: COMMERCIAL

## 2022-06-21 ENCOUNTER — APPOINTMENT (OUTPATIENT)
Dept: UROLOGY | Facility: HOSPITAL | Age: 72
End: 2022-06-21

## 2022-06-21 VITALS
HEIGHT: 70 IN | OXYGEN SATURATION: 100 % | WEIGHT: 201.94 LBS | RESPIRATION RATE: 18 BRPM | SYSTOLIC BLOOD PRESSURE: 210 MMHG | HEART RATE: 86 BPM | DIASTOLIC BLOOD PRESSURE: 103 MMHG | TEMPERATURE: 98 F

## 2022-06-21 DIAGNOSIS — N18.9 CHRONIC KIDNEY DISEASE, UNSPECIFIED: ICD-10-CM

## 2022-06-21 DIAGNOSIS — Z98.890 OTHER SPECIFIED POSTPROCEDURAL STATES: Chronic | ICD-10-CM

## 2022-06-21 DIAGNOSIS — N13.30 UNSPECIFIED HYDRONEPHROSIS: ICD-10-CM

## 2022-06-21 PROBLEM — C85.90 NON-HODGKIN LYMPHOMA, UNSPECIFIED, UNSPECIFIED SITE: Chronic | Status: ACTIVE | Noted: 2022-04-20

## 2022-06-21 PROBLEM — Z86.79 PERSONAL HISTORY OF OTHER DISEASES OF THE CIRCULATORY SYSTEM: Chronic | Status: ACTIVE | Noted: 2022-05-31

## 2022-06-21 LAB — RH IG SCN BLD-IMP: POSITIVE — SIGNIFICANT CHANGE UP

## 2022-06-21 PROCEDURE — 99222 1ST HOSP IP/OBS MODERATE 55: CPT

## 2022-06-21 RX ORDER — ASPIRIN/CALCIUM CARB/MAGNESIUM 324 MG
81 TABLET ORAL DAILY
Refills: 0 | Status: DISCONTINUED | OUTPATIENT
Start: 2022-06-21 | End: 2022-06-23

## 2022-06-21 RX ORDER — CIPROFLOXACIN LACTATE 400MG/40ML
200 VIAL (ML) INTRAVENOUS EVERY 24 HOURS
Refills: 0 | Status: DISCONTINUED | OUTPATIENT
Start: 2022-06-22 | End: 2022-06-23

## 2022-06-21 RX ORDER — TAMSULOSIN HYDROCHLORIDE 0.4 MG/1
0.4 CAPSULE ORAL AT BEDTIME
Refills: 0 | Status: DISCONTINUED | OUTPATIENT
Start: 2022-06-21 | End: 2022-06-23

## 2022-06-21 RX ORDER — LEVOTHYROXINE SODIUM 125 MCG
25 TABLET ORAL DAILY
Refills: 0 | Status: DISCONTINUED | OUTPATIENT
Start: 2022-06-21 | End: 2022-06-23

## 2022-06-21 RX ORDER — SODIUM BICARBONATE 1 MEQ/ML
650 SYRINGE (ML) INTRAVENOUS THREE TIMES A DAY
Refills: 0 | Status: DISCONTINUED | OUTPATIENT
Start: 2022-06-21 | End: 2022-06-23

## 2022-06-21 RX ORDER — LIDOCAINE HCL 20 MG/ML
0.2 VIAL (ML) INJECTION ONCE
Refills: 0 | Status: DISCONTINUED | OUTPATIENT
Start: 2022-06-21 | End: 2022-06-23

## 2022-06-21 RX ORDER — ATORVASTATIN CALCIUM 80 MG/1
20 TABLET, FILM COATED ORAL AT BEDTIME
Refills: 0 | Status: DISCONTINUED | OUTPATIENT
Start: 2022-06-21 | End: 2022-06-23

## 2022-06-21 RX ORDER — HEPARIN SODIUM 5000 [USP'U]/ML
5000 INJECTION INTRAVENOUS; SUBCUTANEOUS EVERY 8 HOURS
Refills: 0 | Status: DISCONTINUED | OUTPATIENT
Start: 2022-06-21 | End: 2022-06-23

## 2022-06-21 RX ORDER — SODIUM CHLORIDE 9 MG/ML
3 INJECTION INTRAMUSCULAR; INTRAVENOUS; SUBCUTANEOUS EVERY 8 HOURS
Refills: 0 | Status: DISCONTINUED | OUTPATIENT
Start: 2022-06-21 | End: 2022-06-23

## 2022-06-21 RX ORDER — CEFAZOLIN SODIUM 1 G
2000 VIAL (EA) INJECTION ONCE
Refills: 0 | Status: DISCONTINUED | OUTPATIENT
Start: 2022-06-21 | End: 2022-06-23

## 2022-06-21 RX ORDER — CIPROFLOXACIN LACTATE 400MG/40ML
VIAL (ML) INTRAVENOUS
Refills: 0 | Status: DISCONTINUED | OUTPATIENT
Start: 2022-06-21 | End: 2022-06-23

## 2022-06-21 RX ORDER — CIPROFLOXACIN LACTATE 400MG/40ML
200 VIAL (ML) INTRAVENOUS ONCE
Refills: 0 | Status: COMPLETED | OUTPATIENT
Start: 2022-06-21 | End: 2022-06-21

## 2022-06-21 RX ORDER — MECLIZINE HCL 12.5 MG
25 TABLET ORAL
Refills: 0 | Status: DISCONTINUED | OUTPATIENT
Start: 2022-06-21 | End: 2022-06-23

## 2022-06-21 RX ORDER — CHLORHEXIDINE GLUCONATE 213 G/1000ML
1 SOLUTION TOPICAL ONCE
Refills: 0 | Status: COMPLETED | OUTPATIENT
Start: 2022-06-21 | End: 2022-06-23

## 2022-06-21 RX ADMIN — ATORVASTATIN CALCIUM 20 MILLIGRAM(S): 80 TABLET, FILM COATED ORAL at 22:15

## 2022-06-21 RX ADMIN — Medication 100 MILLIGRAM(S): at 12:26

## 2022-06-21 RX ADMIN — TAMSULOSIN HYDROCHLORIDE 0.4 MILLIGRAM(S): 0.4 CAPSULE ORAL at 22:15

## 2022-06-21 RX ADMIN — Medication 650 MILLIGRAM(S): at 15:01

## 2022-06-21 RX ADMIN — Medication 0.1 MILLIGRAM(S): at 08:54

## 2022-06-21 RX ADMIN — Medication 650 MILLIGRAM(S): at 22:15

## 2022-06-21 RX ADMIN — SODIUM CHLORIDE 3 MILLILITER(S): 9 INJECTION INTRAMUSCULAR; INTRAVENOUS; SUBCUTANEOUS at 22:04

## 2022-06-21 RX ADMIN — HEPARIN SODIUM 5000 UNIT(S): 5000 INJECTION INTRAVENOUS; SUBCUTANEOUS at 22:15

## 2022-06-21 RX ADMIN — Medication 81 MILLIGRAM(S): at 12:26

## 2022-06-21 RX ADMIN — SODIUM CHLORIDE 3 MILLILITER(S): 9 INJECTION INTRAMUSCULAR; INTRAVENOUS; SUBCUTANEOUS at 14:58

## 2022-06-21 RX ADMIN — HEPARIN SODIUM 5000 UNIT(S): 5000 INJECTION INTRAVENOUS; SUBCUTANEOUS at 15:01

## 2022-06-21 NOTE — PATIENT PROFILE ADULT - FALL HARM RISK - HARM RISK INTERVENTIONS
Assistance with ambulation/Assistance OOB with selected safe patient handling equipment/Communicate Risk of Fall with Harm to all staff/Monitor gait and stability/Reinforce activity limits and safety measures with patient and family/Sit up slowly, dangle for a short time, stand at bedside before walking/Tailored Fall Risk Interventions/Visual Cue: Yellow wristband and red socks/Bed in lowest position, wheels locked, appropriate side rails in place/Call bell, personal items and telephone in reach/Instruct patient to call for assistance before getting out of bed or chair/Non-slip footwear when patient is out of bed/[place] to call system/Physically safe environment - no spills, clutter or unnecessary equipment/Purposeful Proactive Rounding/Room/bathroom lighting operational, light cord in reach

## 2022-06-21 NOTE — CONSULT NOTE ADULT - SUBJECTIVE AND OBJECTIVE BOX
CHIEF COMPLAINT:Patient is a 71y old  Male who presents with a chief complaint of     HISTORY OF PRESENT ILLNESS:    71 male with history of HTN, , O.H. CKD lymphoma, vertigo, hypothyroidism, B/L ureteral stents (placed 15 years ago) was supposed to have surgery for kidney stone   noted to have elevated BP   recently hospitalization with profound O.H was placed on midodrine and clonidine   denies any chest pain, sob, palpitation, dizziness or syncope.     PAST MEDICAL & SURGICAL HISTORY:  Hypertension      Vertigo      Lymphoma  chemo 2014      Chronic kidney disease, unspecified CKD stage      Hypothyroidism      Hyperlipidemia      BPH (benign prostatic hyperplasia)      H/O orthostatic hypotension      S/P urological surgery  B/L uretral stents years ago              MEDICATIONS:  aspirin  chewable 81 milliGRAM(s) Oral daily  cloNIDine 0.1 milliGRAM(s) Oral two times a day  heparin   Injectable 5000 Unit(s) SubCutaneous every 8 hours  tamsulosin 0.4 milliGRAM(s) Oral at bedtime    ceFAZolin   IVPB 2000 milliGRAM(s) IV Intermittent once  ciprofloxacin   IVPB          meclizine 25 milliGRAM(s) Oral two times a day PRN      atorvastatin 20 milliGRAM(s) Oral at bedtime  levothyroxine 25 MICROGram(s) Oral daily    chlorhexidine 2% Cloths 1 Application(s) Topical once  sodium bicarbonate 650 milliGRAM(s) Oral three times a day  sodium chloride 0.9% lock flush 3 milliLiter(s) IV Push every 8 hours      FAMILY HISTORY:  FHx: diabetes mellitus    FHx: hypertension        Non-contributory    SOCIAL HISTORY:    No tobacco, drugs or etoh    Allergies    No Known Allergies    Intolerances    	    REVIEW OF SYSTEMS:  as above  The rest of the 14 points ROS reviewed and except above they are unremarkable.        PHYSICAL EXAM:  T(C): 36.5 (06-21-22 @ 14:45), Max: 36.6 (06-21-22 @ 07:13)  HR: 72 (06-21-22 @ 13:04) (72 - 86)  BP: 163/83 (06-21-22 @ 13:04) (160/74 - 219/116)  RR: 18 (06-21-22 @ 14:45) (18 - 18)  SpO2: 100% (06-21-22 @ 14:45) (98% - 100%)  Wt(kg): --  I&O's Summary    21 Jun 2022 07:01  -  21 Jun 2022 15:22  --------------------------------------------------------  IN: 240 mL / OUT: 850 mL / NET: -610 mL      JVP: Normal  Neck: supple  Lung: clear   CV: S1 S2 , Murmur:  Abd: soft  Ext: No edema  neuro: Awake / alert  Psych: flat affect  Skin: normal    LABS/DATA:    TELEMETRY: 	    ECG:  	   	  CARDIAC MARKERS:                < from: Transthoracic Echocardiogram (04.22.22 @ 06:24) >  CONCLUSIONS:  1. Mitral annular calcification, otherwise normal mitral  valve. Minimal mitral regurgitation.  2. Normal left ventricular internal dimensions and wall  thicknesses.  3. Normal left ventricular systolic function. No segmental  wall motion abnormalities.  4. Mild diastolic dysfunction (Stage I).  5. Normal right ventricular size and function.  No obvious cardiac source of embolus was identified on this  transthoracic study.  If clinical suspicion is high,  consider KATELYNN for further evaluation.  ------------------------------------------------------------------------  Confirmed on  4/22/2022 - 07:30:04 by Marc Peterson M.D.,  [facility], JANIE        proBNP:   Lipid Profile:   HgA1c:   TSH:

## 2022-06-21 NOTE — PATIENT PROFILE ADULT - FUNCTIONAL SCREEN CURRENT LEVEL: COMMUNICATION, MLM
ID Attending Progress Note  [facility]  [phone]  10/12/2020  3:05 PM        ASSESSMENT  -Skin soft tissue infection of left second toe much better with polymicrobial wound cultures  Positive for MSSA and amp S Enterococcus faecalis  -Diabetic foot infection improved.  - Uncontrolled diabetes mellitus  - Coronary disease/dyslipidemia    PLAN  -Discussed with podiatry.  -Plan to transition to p.o. Augmentin for 10 days.  -Follow-up with podiatry as outpatient.  - Discussed with patient, podiatry and primary team.      Subjective:   Feels better   Mild pain to left foot   No fever   Denies any nausea, vomiting, diarrhea  No  fevers    ROS:    A 10 points review of system was done and is negative except for as listed above.    Objective:    Vital signs in last 24 hours:  Temp:  [97.7 °F (36.5 °C)-98.1 °F (36.7 °C)] 97.9 °F (36.6 °C)  Heart Rate:  [57-68] 68  Resp:  [17-18] 17  BP: (111-123)/(68-74) 123/72    Physical Exam:  GENERAL: Normocephalic atraumatic, in NAD  HEENT: Anicteric sclera, well injected conjunctiva  NECK: supple  HEART: RRR  LUNGS: DORIAN,respirations are non labored   ABDOMEN: BS +, non tender to palpation  LOWER EXTREMITIES: no edema, no cyanosis  Left second toe with improved swelling and erythema   Dry scab to tip   No tenderness to palpation   SKIN: no rash  NEURO: AO X3          - iv assess      Laboratory data reviewed    CBC  Recent Labs   Lab 10/12/20  0507 10/10/20  0505 10/09/20  0523   HGB 14.1 14.2 12.8*   HCT 43.0 43.2 40.2    282 265   WBC 7.4 5.4 5.6   RBC 4.81 4.83 4.42*   MCV 89.4 89.4 91.0     Recent Labs     10/12/20  0507   CO2 25   CALCIUM 8.6   CREATININE 0.82   BUN 23*     Recent Labs     10/12/20  0507   CALCIUM 8.6   BUN 23*   CREATININE 0.82     Micro reviewed     Radiology reviewed     MD [facility]  [phone]    10/12/2020  3:05 PM   0 = understands/communicates without difficulty

## 2022-06-21 NOTE — PATIENT PROFILE ADULT - FALL HARM RISK - CONCLUSION
Fall with Harm Risk History:  Patient follows Dr. Neo Alvarado 8/23/2016 findings compatible with SBO  - In 2015, EGD for melena: ulcerative esophagitis and ulcerations of the distal third of the esophagus. 7 to 8 mm ulcer at the GE junction with clean base. Gastric erosions and duodenal erosions.  -History of SB resection for ischemia in 2015. Allergies:  Patient has no known allergies. Social History:   TOBACCO:   reports that he quit smoking about 41 years ago. He has never used smokeless tobacco.  ETOH:   reports current alcohol use of about 1.0 standard drinks of alcohol per week. Review Of Systems  GENERAL: No fever, chills or weight loss. EYES:  No  blurred vision, double vision   CARDIOVASCULAR: No chest pain or palpitations. RESPIRATORY:  No dyspnea or cough. GI:  See HPI  MUSCULOSKELETAL: Bone tenderness over the injured shins. No new painful or swollen joints or myalgias. :   No dysuria or hematuria. SKIN:  No rashes or jaundice. NEUROLOGIC:  No headaches or seizures, numbness or tingling of arms, or legs. PSYCH:  No anxiety or depression. ENDOCRINE:  No polyuria or polydipsia. BLOOD:  No anemia, bleeding disorder, blood or blood product transfusion. PHYSICAL EXAM:  BP (!) 147/66   Pulse 60   Temp 97.3 °F (36.3 °C) (Oral)   Resp 18   Ht 5' 4\" (1.626 m)   Wt 169 lb 12.8 oz (77 kg)   SpO2 95%   BMI 29.15 kg/m²     General appearance: No apparent distress, appears stated age and cooperative. HEENT: Normal cephalic, atraumatic without obvious deformity. Pupils equal, round, and reactive to light. Neck: Supple, with full range of motion. No jugular venous distention. Trachea midline. Respiratory:  Normal respiratory effort. Clear to auscultation, bilaterally without Rales/Wheezes/Rhonchi. Cardiovascular: Regular rate and rhythm without murmurs, rubs or gallops. Abdomen: Soft, non-tender, non-distended with active bowel sounds.   Musculoskeletal: No clubbing, cyanosis or edema bilaterally. Abrasions BLE anterior surfaces below the knee, healing appropriately. Skin: Pink, warm, dry. No rashes or lesions. Psychiatric: Alert and oriented to self only, answers questions appropriate after re-directing multiple times, thought content appropriate, normal insight    Labs:   Recent Labs     01/25/21  0449   WBC 8.5   HGB 8.3*   HCT 25.8*        Recent Labs     01/25/21  0449      K 4.2      CO2 22*   BUN 15   CREATININE 1.0   CALCIUM 8.1*     No results for input(s): AST, ALT, BILIDIR, BILITOT, ALKPHOS in the last 72 hours. Recent Labs     01/24/21  0511   INR 2.01*       Radiology:   Imaging reviewed. CT chest 01/17/2021: Moderate right pleural effusion, right lung base mild opacity. Code Status: Full Code    ASSESSMENT:  Functional dysphagia without odynophagia, failed swallow study  S/p Covid PNA complicated by metabolic encephalopathy  Loss of appetite  Elevated INR 2.01 - Patient is on warfarin, Lovenox, aspirin. Chronic A. fib with HR fluctuations into bradycardia (30-40bpm)  Acute traumatic rhabdomyolysis from fall  Prerenal hyponatremia  Normocytic anemia of unclear chronicity  History of CABG      PLAN:     Hold warfarin, in the meantime anticoagulate with reversible agents e.g. heparin prior to PEG placement.  Plan for PEG placement once INR goes down.  Patient failed SLP, trial of gastric feeds.  Aspiration precautions       Case reviewed and impression/plan reviewed in collaboration with Dr. Corinne Hargrove. Electronically signed by Khari Bright MD on 1/25/2021 at 1:52 PM    [facility]  Thank you for the consultation.

## 2022-06-21 NOTE — CONSULT NOTE ADULT - ASSESSMENT
HTN  has Orthostatic Hypotension   resume clonidine   monitor Orthostatic vitals  will consider midodrine     Kidney stone  fu with   plan for surgery     Diastolic CHF  mild   grade I   stable      Advanced care planning was discussed with patient and family.  Risks, benefits and alternatives of the cardiac treatments and medical therapy including procedures were discussed in detail and all questions were answered. Importance of compliance with medical therapy and lifestyle modification to improve cardiovascular health were addressed. Appropriate forms and patient educational materials were reviewed. 30 minutes face to face spent.    
71 male with history of HTN, , O.H. CKD lymphoma, vertigo, hypothyroidism, B/L ureteral stents (placed 15 years ago) was supposed to have surgery for kidney stone   noted to have elevated Blood pressure       # HTN Uncontrolled Cards consulted.   Continue with Cloniidine.       # Hypothyroidism Continue with Synthroid       # CKD Monitor  in the setting of Renal stone

## 2022-06-21 NOTE — CONSULT NOTE ADULT - SUBJECTIVE AND OBJECTIVE BOX
HPI:      PAST MEDICAL & SURGICAL HISTORY:  Hypertension      Vertigo      Lymphoma  chemo 2014      Chronic kidney disease, unspecified CKD stage      Hypothyroidism      Hyperlipidemia      BPH (benign prostatic hyperplasia)      H/O orthostatic hypotension      S/P urological surgery  B/L uretral stents years ago          Review of Systems:   CONSTITUTIONAL: No fever, weight loss, or fatigue  EYES: No eye pain, visual disturbances, or discharge  ENMT:  No difficulty hearing, tinnitus, vertigo; No sinus or throat pain  NECK: No pain or stiffness  BREASTS: No pain, masses, or nipple discharge  RESPIRATORY: No cough, wheezing, chills or hemoptysis; No shortness of breath  CARDIOVASCULAR: No chest pain, palpitations, dizziness, or leg swelling  GASTROINTESTINAL: No abdominal or epigastric pain. No nausea, vomiting, or hematemesis; No diarrhea or constipation. No melena or hematochezia.  GENITOURINARY: No dysuria, frequency, hematuria, or incontinence  NEUROLOGICAL: No headaches, memory loss, loss of strength, numbness, or tremors  SKIN: No itching, burning, rashes, or lesions   LYMPH NODES: No enlarged glands  ENDOCRINE: No heat or cold intolerance; No hair loss  MUSCULOSKELETAL: No joint pain or swelling; No muscle, back, or extremity pain  PSYCHIATRIC: No depression, anxiety, mood swings, or difficulty sleeping  HEME/LYMPH: No easy bruising, or bleeding gums  ALLERY AND IMMUNOLOGIC: No hives or eczema    Allergies    No Known Allergies    Intolerances        Social History:     FAMILY HISTORY:  FHx: diabetes mellitus    FHx: hypertension        MEDICATIONS  (STANDING):  aspirin  chewable 81 milliGRAM(s) Oral daily  atorvastatin 20 milliGRAM(s) Oral at bedtime  ceFAZolin   IVPB 2000 milliGRAM(s) IV Intermittent once  chlorhexidine 2% Cloths 1 Application(s) Topical once  ciprofloxacin   IVPB      cloNIDine 0.1 milliGRAM(s) Oral two times a day  heparin   Injectable 5000 Unit(s) SubCutaneous every 8 hours  levothyroxine 25 MICROGram(s) Oral daily  lidocaine 1% Injectable 0.2 milliLiter(s) Local Injection once  sodium bicarbonate 650 milliGRAM(s) Oral three times a day  sodium chloride 0.9% lock flush 3 milliLiter(s) IV Push every 8 hours  tamsulosin 0.4 milliGRAM(s) Oral at bedtime    MEDICATIONS  (PRN):  meclizine 25 milliGRAM(s) Oral two times a day PRN Dizziness        CAPILLARY BLOOD GLUCOSE        I&O's Summary    21 Jun 2022 07:01  -  21 Jun 2022 15:52  --------------------------------------------------------  IN: 240 mL / OUT: 850 mL / NET: -610 mL        PHYSICAL EXAM:  GENERAL: NAD, well-developed  HEAD:  Atraumatic, Normocephalic  EYES: EOMI, PERRLA, conjunctiva and sclera clear  NECK: Supple, No JVD  CHEST/LUNG: Clear to auscultation bilaterally; No wheeze  HEART: Regular rate and rhythm; No murmurs, rubs, or gallops  ABDOMEN: Soft, Nontender, Nondistended; Bowel sounds present  EXTREMITIES:  2+ Peripheral Pulses, No clubbing, cyanosis, or edema  PSYCH: AAOx3  NEUROLOGY: non-focal  SKIN: No rashes or lesions    LABS:                    RADIOLOGY & ADDITIONAL TESTS:    Imaging Personally Reviewed:    Consultant(s) Notes Reviewed:      Care Discussed with Consultants/Other Providers:       HPI: 71 male with history of HTN, , O.H. CKD lymphoma, vertigo, hypothyroidism, B/L ureteral stents (placed 15 years ago) was supposed to have surgery for kidney stone   noted to have elevated BP       PAST MEDICAL & SURGICAL HISTORY:  Hypertension      Vertigo      Lymphoma  chemo 2014      Chronic kidney disease, unspecified CKD stage      Hypothyroidism      Hyperlipidemia      BPH (benign prostatic hyperplasia)      H/O orthostatic hypotension      S/P urological surgery  B/L uretral stents years ago          Review of Systems:   CONSTITUTIONAL: No fever, weight loss, or fatigue  EYES: No eye pain, visual disturbances, or discharge  ENMT:  No difficulty hearing, tinnitus, vertigo; No sinus or throat pain  NECK: No pain or stiffness  BREASTS: No pain, masses, or nipple discharge  RESPIRATORY: No cough, wheezing, chills or hemoptysis; No shortness of breath  CARDIOVASCULAR: No chest pain, palpitations, dizziness, or leg swelling  GASTROINTESTINAL: No abdominal or epigastric pain. No nausea, vomiting, or hematemesis; No diarrhea or constipation. No melena or hematochezia.  GENITOURINARY: No dysuria, frequency, hematuria, or incontinence  NEUROLOGICAL: No headaches, memory loss, loss of strength, numbness, or tremors  SKIN: No itching, burning, rashes, or lesions   LYMPH NODES: No enlarged glands  ENDOCRINE: No heat or cold intolerance; No hair loss  MUSCULOSKELETAL: No joint pain or swelling; No muscle, back, or extremity pain  PSYCHIATRIC: No depression, anxiety, mood swings, or difficulty sleeping  HEME/LYMPH: No easy bruising, or bleeding gums  ALLERY AND IMMUNOLOGIC: No hives or eczema    Allergies    No Known Allergies    Intolerances        Social History:     FAMILY HISTORY:  FHx: diabetes mellitus    FHx: hypertension        MEDICATIONS  (STANDING):  aspirin  chewable 81 milliGRAM(s) Oral daily  atorvastatin 20 milliGRAM(s) Oral at bedtime  ceFAZolin   IVPB 2000 milliGRAM(s) IV Intermittent once  chlorhexidine 2% Cloths 1 Application(s) Topical once  ciprofloxacin   IVPB      ciprofloxacin   IVPB 200 milliGRAM(s) IV Intermittent every 24 hours  cloNIDine 0.1 milliGRAM(s) Oral two times a day  heparin   Injectable 5000 Unit(s) SubCutaneous every 8 hours  levothyroxine 25 MICROGram(s) Oral daily  lidocaine 1% Injectable 0.2 milliLiter(s) Local Injection once  sodium bicarbonate 650 milliGRAM(s) Oral three times a day  sodium chloride 0.9% lock flush 3 milliLiter(s) IV Push every 8 hours  tamsulosin 0.4 milliGRAM(s) Oral at bedtime    MEDICATIONS  (PRN):  meclizine 25 milliGRAM(s) Oral two times a day PRN Dizziness        CAPILLARY BLOOD GLUCOSE        I&O's Summary    21 Jun 2022 07:01  -  22 Jun 2022 00:34  --------------------------------------------------------  IN: 940 mL / OUT: 1630 mL / NET: -690 mL        PHYSICAL EXAM:  GENERAL: NAD, well-developed  HEAD:  Atraumatic, Normocephalic  EYES: EOMI, PERRLA, conjunctiva and sclera clear  NECK: Supple, No JVD  CHEST/LUNG: Clear to auscultation bilaterally; No wheeze  HEART: Regular rate and rhythm; No murmurs, rubs, or gallops  ABDOMEN: Soft, Nontender, Nondistended; Bowel sounds present  EXTREMITIES:  2+ Peripheral Pulses, No clubbing, cyanosis, or edema  PSYCH: AAOx3  NEUROLOGY: non-focal  SKIN: No rashes or lesions    LABS:                    RADIOLOGY & ADDITIONAL TESTS:    Imaging Personally Reviewed:    Consultant(s) Notes Reviewed:      Care Discussed with Consultants/Other Providers:

## 2022-06-22 ENCOUNTER — TRANSCRIPTION ENCOUNTER (OUTPATIENT)
Age: 72
End: 2022-06-22

## 2022-06-22 LAB
ANION GAP SERPL CALC-SCNC: 15 MMOL/L — SIGNIFICANT CHANGE UP (ref 5–17)
BLD GP AB SCN SERPL QL: NEGATIVE — SIGNIFICANT CHANGE UP
BUN SERPL-MCNC: 54 MG/DL — HIGH (ref 7–23)
CALCIUM SERPL-MCNC: 8.5 MG/DL — SIGNIFICANT CHANGE UP (ref 8.4–10.5)
CHLORIDE SERPL-SCNC: 105 MMOL/L — SIGNIFICANT CHANGE UP (ref 96–108)
CO2 SERPL-SCNC: 23 MMOL/L — SIGNIFICANT CHANGE UP (ref 22–31)
CREAT SERPL-MCNC: 4.15 MG/DL — HIGH (ref 0.5–1.3)
EGFR: 15 ML/MIN/1.73M2 — LOW
GLUCOSE SERPL-MCNC: 117 MG/DL — HIGH (ref 70–99)
HCT VFR BLD CALC: 31.8 % — LOW (ref 39–50)
HGB BLD-MCNC: 10 G/DL — LOW (ref 13–17)
MCHC RBC-ENTMCNC: 29.2 PG — SIGNIFICANT CHANGE UP (ref 27–34)
MCHC RBC-ENTMCNC: 31.4 GM/DL — LOW (ref 32–36)
MCV RBC AUTO: 93 FL — SIGNIFICANT CHANGE UP (ref 80–100)
NRBC # BLD: 0 /100 WBCS — SIGNIFICANT CHANGE UP (ref 0–0)
PLATELET # BLD AUTO: 163 K/UL — SIGNIFICANT CHANGE UP (ref 150–400)
POTASSIUM SERPL-MCNC: 4.2 MMOL/L — SIGNIFICANT CHANGE UP (ref 3.5–5.3)
POTASSIUM SERPL-SCNC: 4.2 MMOL/L — SIGNIFICANT CHANGE UP (ref 3.5–5.3)
RBC # BLD: 3.42 M/UL — LOW (ref 4.2–5.8)
RBC # FLD: 14.6 % — HIGH (ref 10.3–14.5)
RH IG SCN BLD-IMP: POSITIVE — SIGNIFICANT CHANGE UP
SARS-COV-2 RNA SPEC QL NAA+PROBE: SIGNIFICANT CHANGE UP
SODIUM SERPL-SCNC: 143 MMOL/L — SIGNIFICANT CHANGE UP (ref 135–145)
TSH SERPL-MCNC: 6.91 UIU/ML — HIGH (ref 0.27–4.2)
WBC # BLD: 5.02 K/UL — SIGNIFICANT CHANGE UP (ref 3.8–10.5)
WBC # FLD AUTO: 5.02 K/UL — SIGNIFICANT CHANGE UP (ref 3.8–10.5)

## 2022-06-22 PROCEDURE — 99232 SBSQ HOSP IP/OBS MODERATE 35: CPT | Mod: 25

## 2022-06-22 RX ORDER — MIDODRINE HYDROCHLORIDE 2.5 MG/1
5 TABLET ORAL THREE TIMES A DAY
Refills: 0 | Status: DISCONTINUED | OUTPATIENT
Start: 2022-06-22 | End: 2022-06-23

## 2022-06-22 RX ORDER — SODIUM CHLORIDE 9 MG/ML
1000 INJECTION, SOLUTION INTRAVENOUS
Refills: 0 | Status: DISCONTINUED | OUTPATIENT
Start: 2022-06-22 | End: 2022-06-23

## 2022-06-22 RX ADMIN — SODIUM CHLORIDE 3 MILLILITER(S): 9 INJECTION INTRAMUSCULAR; INTRAVENOUS; SUBCUTANEOUS at 22:00

## 2022-06-22 RX ADMIN — SODIUM CHLORIDE 3 MILLILITER(S): 9 INJECTION INTRAMUSCULAR; INTRAVENOUS; SUBCUTANEOUS at 06:25

## 2022-06-22 RX ADMIN — SODIUM CHLORIDE 3 MILLILITER(S): 9 INJECTION INTRAMUSCULAR; INTRAVENOUS; SUBCUTANEOUS at 13:16

## 2022-06-22 RX ADMIN — TAMSULOSIN HYDROCHLORIDE 0.4 MILLIGRAM(S): 0.4 CAPSULE ORAL at 22:13

## 2022-06-22 RX ADMIN — HEPARIN SODIUM 5000 UNIT(S): 5000 INJECTION INTRAVENOUS; SUBCUTANEOUS at 22:15

## 2022-06-22 RX ADMIN — Medication 0.1 MILLIGRAM(S): at 17:14

## 2022-06-22 RX ADMIN — MIDODRINE HYDROCHLORIDE 5 MILLIGRAM(S): 2.5 TABLET ORAL at 13:12

## 2022-06-22 RX ADMIN — MIDODRINE HYDROCHLORIDE 5 MILLIGRAM(S): 2.5 TABLET ORAL at 17:14

## 2022-06-22 RX ADMIN — Medication 25 MICROGRAM(S): at 06:26

## 2022-06-22 RX ADMIN — Medication 650 MILLIGRAM(S): at 13:11

## 2022-06-22 RX ADMIN — Medication 650 MILLIGRAM(S): at 06:27

## 2022-06-22 RX ADMIN — ATORVASTATIN CALCIUM 20 MILLIGRAM(S): 80 TABLET, FILM COATED ORAL at 22:13

## 2022-06-22 RX ADMIN — HEPARIN SODIUM 5000 UNIT(S): 5000 INJECTION INTRAVENOUS; SUBCUTANEOUS at 13:11

## 2022-06-22 RX ADMIN — Medication 81 MILLIGRAM(S): at 13:12

## 2022-06-22 RX ADMIN — Medication 650 MILLIGRAM(S): at 22:14

## 2022-06-22 RX ADMIN — HEPARIN SODIUM 5000 UNIT(S): 5000 INJECTION INTRAVENOUS; SUBCUTANEOUS at 06:27

## 2022-06-22 RX ADMIN — Medication 100 MILLIGRAM(S): at 09:02

## 2022-06-22 RX ADMIN — Medication 0.1 MILLIGRAM(S): at 06:27

## 2022-06-22 NOTE — PROGRESS NOTE ADULT - ATTENDING COMMENTS
As above  Pt feels well  No pain  Cardiology and medicine eval appreciated  Plan for OR tomorrow pending clearances

## 2022-06-23 ENCOUNTER — APPOINTMENT (OUTPATIENT)
Dept: UROLOGY | Facility: HOSPITAL | Age: 72
End: 2022-06-23

## 2022-06-23 LAB
ANION GAP SERPL CALC-SCNC: 12 MMOL/L — SIGNIFICANT CHANGE UP (ref 5–17)
APTT BLD: 46.6 SEC — HIGH (ref 27.5–35.5)
BUN SERPL-MCNC: 55 MG/DL — HIGH (ref 7–23)
CALCIUM SERPL-MCNC: 8.2 MG/DL — LOW (ref 8.4–10.5)
CHLORIDE SERPL-SCNC: 105 MMOL/L — SIGNIFICANT CHANGE UP (ref 96–108)
CO2 SERPL-SCNC: 25 MMOL/L — SIGNIFICANT CHANGE UP (ref 22–31)
CREAT SERPL-MCNC: 3.74 MG/DL — HIGH (ref 0.5–1.3)
EGFR: 17 ML/MIN/1.73M2 — LOW
GLUCOSE SERPL-MCNC: 147 MG/DL — HIGH (ref 70–99)
HCT VFR BLD CALC: 31.6 % — LOW (ref 39–50)
HGB BLD-MCNC: 10.1 G/DL — LOW (ref 13–17)
INR BLD: 1.06 RATIO — SIGNIFICANT CHANGE UP (ref 0.88–1.16)
MCHC RBC-ENTMCNC: 29.6 PG — SIGNIFICANT CHANGE UP (ref 27–34)
MCHC RBC-ENTMCNC: 32 GM/DL — SIGNIFICANT CHANGE UP (ref 32–36)
MCV RBC AUTO: 92.7 FL — SIGNIFICANT CHANGE UP (ref 80–100)
NRBC # BLD: 0 /100 WBCS — SIGNIFICANT CHANGE UP (ref 0–0)
PLATELET # BLD AUTO: 160 K/UL — SIGNIFICANT CHANGE UP (ref 150–400)
POTASSIUM SERPL-MCNC: 4.1 MMOL/L — SIGNIFICANT CHANGE UP (ref 3.5–5.3)
POTASSIUM SERPL-SCNC: 4.1 MMOL/L — SIGNIFICANT CHANGE UP (ref 3.5–5.3)
PROTHROM AB SERPL-ACNC: 12.2 SEC — SIGNIFICANT CHANGE UP (ref 10.5–13.4)
RBC # BLD: 3.41 M/UL — LOW (ref 4.2–5.8)
RBC # FLD: 14.4 % — SIGNIFICANT CHANGE UP (ref 10.3–14.5)
SODIUM SERPL-SCNC: 142 MMOL/L — SIGNIFICANT CHANGE UP (ref 135–145)
WBC # BLD: 4.56 K/UL — SIGNIFICANT CHANGE UP (ref 3.8–10.5)
WBC # FLD AUTO: 4.56 K/UL — SIGNIFICANT CHANGE UP (ref 3.8–10.5)

## 2022-06-23 PROCEDURE — 52353 CYSTOURETERO W/LITHOTRIPSY: CPT | Mod: LT

## 2022-06-23 PROCEDURE — 50389 REMOVE RENAL TUBE W/FLUORO: CPT | Mod: LT,59

## 2022-06-23 PROCEDURE — 74420 UROGRAPHY RTRGR +-KUB: CPT | Mod: 26

## 2022-06-23 PROCEDURE — 52332 CYSTOSCOPY AND TREATMENT: CPT | Mod: 50,59

## 2022-06-23 DEVICE — LASER FIBER SOLTIVE 200 BALL TIP: Type: IMPLANTABLE DEVICE | Site: LEFT | Status: FUNCTIONAL

## 2022-06-23 DEVICE — IMPLANTABLE DEVICE: Type: IMPLANTABLE DEVICE | Site: LEFT | Status: FUNCTIONAL

## 2022-06-23 DEVICE — URETERAL CATH SOF-FLEX OPEN END 6FR .040" X 70CM: Type: IMPLANTABLE DEVICE | Site: LEFT | Status: FUNCTIONAL

## 2022-06-23 DEVICE — GUIDEWIRE SENSOR DUAL-FLEX NITINOL STRAIGHT .035" X 150CM: Type: IMPLANTABLE DEVICE | Site: LEFT | Status: FUNCTIONAL

## 2022-06-23 DEVICE — URETERAL STENT INLAY OPTIMA 8FR 26CM: Type: IMPLANTABLE DEVICE | Site: LEFT | Status: FUNCTIONAL

## 2022-06-23 DEVICE — STONE BASKET ZEROTIP NITINOL 4-WIRE 1.9FR 120CM X 12MM: Type: IMPLANTABLE DEVICE | Site: LEFT | Status: FUNCTIONAL

## 2022-06-23 DEVICE — AMPLATZ RENAL DILATOR 6FR-30FR X 16CM: Type: IMPLANTABLE DEVICE | Site: LEFT | Status: FUNCTIONAL

## 2022-06-23 RX ORDER — LEVOTHYROXINE SODIUM 125 MCG
25 TABLET ORAL DAILY
Refills: 0 | Status: DISCONTINUED | OUTPATIENT
Start: 2022-06-23 | End: 2022-06-24

## 2022-06-23 RX ORDER — CIPROFLOXACIN LACTATE 400MG/40ML
200 VIAL (ML) INTRAVENOUS EVERY 24 HOURS
Refills: 0 | Status: DISCONTINUED | OUTPATIENT
Start: 2022-06-23 | End: 2022-06-24

## 2022-06-23 RX ORDER — PHENAZOPYRIDINE HCL 100 MG
100 TABLET ORAL EVERY 8 HOURS
Refills: 0 | Status: DISCONTINUED | OUTPATIENT
Start: 2022-06-23 | End: 2022-06-24

## 2022-06-23 RX ORDER — MECLIZINE HCL 12.5 MG
25 TABLET ORAL
Refills: 0 | Status: DISCONTINUED | OUTPATIENT
Start: 2022-06-23 | End: 2022-06-24

## 2022-06-23 RX ORDER — TAMSULOSIN HYDROCHLORIDE 0.4 MG/1
0.4 CAPSULE ORAL AT BEDTIME
Refills: 0 | Status: DISCONTINUED | OUTPATIENT
Start: 2022-06-23 | End: 2022-06-24

## 2022-06-23 RX ORDER — MIDODRINE HYDROCHLORIDE 2.5 MG/1
5 TABLET ORAL THREE TIMES A DAY
Refills: 0 | Status: DISCONTINUED | OUTPATIENT
Start: 2022-06-23 | End: 2022-06-24

## 2022-06-23 RX ORDER — SODIUM BICARBONATE 1 MEQ/ML
650 SYRINGE (ML) INTRAVENOUS THREE TIMES A DAY
Refills: 0 | Status: DISCONTINUED | OUTPATIENT
Start: 2022-06-23 | End: 2022-06-24

## 2022-06-23 RX ORDER — ATORVASTATIN CALCIUM 80 MG/1
20 TABLET, FILM COATED ORAL AT BEDTIME
Refills: 0 | Status: DISCONTINUED | OUTPATIENT
Start: 2022-06-23 | End: 2022-06-24

## 2022-06-23 RX ORDER — SODIUM CHLORIDE 9 MG/ML
1000 INJECTION, SOLUTION INTRAVENOUS
Refills: 0 | Status: DISCONTINUED | OUTPATIENT
Start: 2022-06-23 | End: 2022-06-24

## 2022-06-23 RX ORDER — ASPIRIN/CALCIUM CARB/MAGNESIUM 324 MG
81 TABLET ORAL DAILY
Refills: 0 | Status: DISCONTINUED | OUTPATIENT
Start: 2022-06-23 | End: 2022-06-24

## 2022-06-23 RX ORDER — HEPARIN SODIUM 5000 [USP'U]/ML
5000 INJECTION INTRAVENOUS; SUBCUTANEOUS EVERY 8 HOURS
Refills: 0 | Status: DISCONTINUED | OUTPATIENT
Start: 2022-06-23 | End: 2022-06-24

## 2022-06-23 RX ORDER — SODIUM CHLORIDE 9 MG/ML
3 INJECTION INTRAMUSCULAR; INTRAVENOUS; SUBCUTANEOUS EVERY 8 HOURS
Refills: 0 | Status: DISCONTINUED | OUTPATIENT
Start: 2022-06-23 | End: 2022-06-24

## 2022-06-23 RX ADMIN — ATORVASTATIN CALCIUM 20 MILLIGRAM(S): 80 TABLET, FILM COATED ORAL at 21:12

## 2022-06-23 RX ADMIN — Medication 650 MILLIGRAM(S): at 21:43

## 2022-06-23 RX ADMIN — HEPARIN SODIUM 5000 UNIT(S): 5000 INJECTION INTRAVENOUS; SUBCUTANEOUS at 06:09

## 2022-06-23 RX ADMIN — CHLORHEXIDINE GLUCONATE 1 APPLICATION(S): 213 SOLUTION TOPICAL at 16:37

## 2022-06-23 RX ADMIN — SODIUM CHLORIDE 3 MILLILITER(S): 9 INJECTION INTRAMUSCULAR; INTRAVENOUS; SUBCUTANEOUS at 21:14

## 2022-06-23 RX ADMIN — Medication 0.1 MILLIGRAM(S): at 16:37

## 2022-06-23 RX ADMIN — Medication 100 MILLIGRAM(S): at 11:59

## 2022-06-23 RX ADMIN — HEPARIN SODIUM 5000 UNIT(S): 5000 INJECTION INTRAVENOUS; SUBCUTANEOUS at 21:12

## 2022-06-23 RX ADMIN — Medication 25 MICROGRAM(S): at 06:10

## 2022-06-23 RX ADMIN — Medication 650 MILLIGRAM(S): at 06:09

## 2022-06-23 RX ADMIN — TAMSULOSIN HYDROCHLORIDE 0.4 MILLIGRAM(S): 0.4 CAPSULE ORAL at 21:12

## 2022-06-23 RX ADMIN — SODIUM CHLORIDE 3 MILLILITER(S): 9 INJECTION INTRAMUSCULAR; INTRAVENOUS; SUBCUTANEOUS at 06:07

## 2022-06-23 RX ADMIN — Medication 0.1 MILLIGRAM(S): at 06:09

## 2022-06-23 RX ADMIN — Medication 0.1 MILLIGRAM(S): at 15:09

## 2022-06-23 RX ADMIN — SODIUM CHLORIDE 75 MILLILITER(S): 9 INJECTION, SOLUTION INTRAVENOUS at 00:27

## 2022-06-23 NOTE — PHYSICAL THERAPY INITIAL EVALUATION ADULT - PERTINENT HX OF CURRENT PROBLEM, REHAB EVAL
71 y.o. M PMH lymphoma, S/P chemo via right chest mediport 2014, mediport remains in place, chronic diastolic CHF, CKD stage 4, hypothyroidism, S/P b/l ureteral stents placement 15 years ago. Sent to ER in [place] from his urologist's office w severe left hydronephrosis & RUTH, the right stent was exchanged without difficulty, the left stent is retained and encrusted.

## 2022-06-23 NOTE — PHYSICAL THERAPY INITIAL EVALUATION ADULT - ADDITIONAL COMMENTS
As per patient, he lives with his daughter and son in a private house (5 ENTRY STEPS, BEDROOM IN 2ND FLOOR- HAVE A CHAIR LIFT)  and is independent with most adls.  No recent CHHA/ pt owns and uses a walker and cane to ambulate. Pt -states he designates his daughter- Elvira as his caregiver at this time. Per pt, he has a glucometer at home.

## 2022-06-23 NOTE — BRIEF OPERATIVE NOTE - OPERATION/FINDINGS
L URS, encrusted stent removal, left ureteral stent placement, left NT removal, right ureteral stent exchange

## 2022-06-23 NOTE — PHYSICAL THERAPY INITIAL EVALUATION ADULT - PRECAUTIONS/LIMITATIONS, REHAB EVAL
Pt became hypoxic after sedation for procedure requiring intubation; however, recovered quickly and was not thought to be due to a primary pulmonary issue. Had orthostatic hypotension while admitted to [facility] in April 2022, had cardiology consult with Dr. Goodson, is on midodrine. Had  urine culture 5/17/22 positive for enterococcus faecalis, treated with Cipro./no known precautions/limitations

## 2022-06-23 NOTE — PRE-OP CHECKLIST - 1.
emotional support and preop teaching provided to pt and family.
108
emotional support and preop teaching provided to pt

## 2022-06-23 NOTE — PHYSICAL THERAPY INITIAL EVALUATION ADULT - DID THE PATIENT HAVE SURGERY?
s/p L URS, encrusted stent removal, left ureteral stent placement, left NT removal, right ureteral stent exchange/yes

## 2022-06-23 NOTE — PRE-OP CHECKLIST - INTERNAL PROSTHESES
right chest wall mediport/yes(specify) right chest wall mediport, left nephrostomy tube/yes(specify)

## 2022-06-24 ENCOUNTER — TRANSCRIPTION ENCOUNTER (OUTPATIENT)
Age: 72
End: 2022-06-24

## 2022-06-24 ENCOUNTER — RESULT REVIEW (OUTPATIENT)
Age: 72
End: 2022-06-24

## 2022-06-24 VITALS
DIASTOLIC BLOOD PRESSURE: 65 MMHG | TEMPERATURE: 98 F | RESPIRATION RATE: 18 BRPM | SYSTOLIC BLOOD PRESSURE: 105 MMHG | OXYGEN SATURATION: 99 % | HEART RATE: 84 BPM

## 2022-06-24 LAB
ANION GAP SERPL CALC-SCNC: 18 MMOL/L — HIGH (ref 5–17)
BUN SERPL-MCNC: 57 MG/DL — HIGH (ref 7–23)
CALCIUM SERPL-MCNC: 8.4 MG/DL — SIGNIFICANT CHANGE UP (ref 8.4–10.5)
CHLORIDE SERPL-SCNC: 103 MMOL/L — SIGNIFICANT CHANGE UP (ref 96–108)
CO2 SERPL-SCNC: 21 MMOL/L — LOW (ref 22–31)
CREAT SERPL-MCNC: 4.17 MG/DL — HIGH (ref 0.5–1.3)
EGFR: 14 ML/MIN/1.73M2 — LOW
GLUCOSE SERPL-MCNC: 213 MG/DL — HIGH (ref 70–99)
HCT VFR BLD CALC: 34.3 % — LOW (ref 39–50)
HGB BLD-MCNC: 10.9 G/DL — LOW (ref 13–17)
MCHC RBC-ENTMCNC: 29.4 PG — SIGNIFICANT CHANGE UP (ref 27–34)
MCHC RBC-ENTMCNC: 31.8 GM/DL — LOW (ref 32–36)
MCV RBC AUTO: 92.5 FL — SIGNIFICANT CHANGE UP (ref 80–100)
NRBC # BLD: 0 /100 WBCS — SIGNIFICANT CHANGE UP (ref 0–0)
PLATELET # BLD AUTO: 177 K/UL — SIGNIFICANT CHANGE UP (ref 150–400)
POTASSIUM SERPL-MCNC: 4.9 MMOL/L — SIGNIFICANT CHANGE UP (ref 3.5–5.3)
POTASSIUM SERPL-SCNC: 4.9 MMOL/L — SIGNIFICANT CHANGE UP (ref 3.5–5.3)
RBC # BLD: 3.71 M/UL — LOW (ref 4.2–5.8)
RBC # FLD: 14.7 % — HIGH (ref 10.3–14.5)
SODIUM SERPL-SCNC: 142 MMOL/L — SIGNIFICANT CHANGE UP (ref 135–145)
WBC # BLD: 8.68 K/UL — SIGNIFICANT CHANGE UP (ref 3.8–10.5)
WBC # FLD AUTO: 8.68 K/UL — SIGNIFICANT CHANGE UP (ref 3.8–10.5)

## 2022-06-24 PROCEDURE — 36415 COLL VENOUS BLD VENIPUNCTURE: CPT

## 2022-06-24 PROCEDURE — 51702 INSERT TEMP BLADDER CATH: CPT

## 2022-06-24 PROCEDURE — 85730 THROMBOPLASTIN TIME PARTIAL: CPT

## 2022-06-24 PROCEDURE — 82365 CALCULUS SPECTROSCOPY: CPT

## 2022-06-24 PROCEDURE — 80048 BASIC METABOLIC PNL TOTAL CA: CPT

## 2022-06-24 PROCEDURE — C9399: CPT

## 2022-06-24 PROCEDURE — 86901 BLOOD TYPING SEROLOGIC RH(D): CPT

## 2022-06-24 PROCEDURE — U0005: CPT

## 2022-06-24 PROCEDURE — C1758: CPT

## 2022-06-24 PROCEDURE — U0003: CPT

## 2022-06-24 PROCEDURE — 86900 BLOOD TYPING SEROLOGIC ABO: CPT

## 2022-06-24 PROCEDURE — 97162 PT EVAL MOD COMPLEX 30 MIN: CPT

## 2022-06-24 PROCEDURE — 85027 COMPLETE CBC AUTOMATED: CPT

## 2022-06-24 PROCEDURE — 85610 PROTHROMBIN TIME: CPT

## 2022-06-24 PROCEDURE — 76000 FLUOROSCOPY <1 HR PHYS/QHP: CPT

## 2022-06-24 PROCEDURE — 84443 ASSAY THYROID STIM HORMONE: CPT

## 2022-06-24 PROCEDURE — 86850 RBC ANTIBODY SCREEN: CPT

## 2022-06-24 PROCEDURE — C1769: CPT

## 2022-06-24 PROCEDURE — C1889: CPT

## 2022-06-24 PROCEDURE — 88300 SURGICAL PATH GROSS: CPT | Mod: 26

## 2022-06-24 PROCEDURE — C2625: CPT

## 2022-06-24 PROCEDURE — 88300 SURGICAL PATH GROSS: CPT

## 2022-06-24 RX ORDER — CIPROFLOXACIN LACTATE 400MG/40ML
1 VIAL (ML) INTRAVENOUS
Qty: 0 | Refills: 0 | DISCHARGE

## 2022-06-24 RX ORDER — SODIUM BICARBONATE 1 MEQ/ML
1 SYRINGE (ML) INTRAVENOUS
Qty: 90 | Refills: 0
Start: 2022-06-24

## 2022-06-24 RX ORDER — SODIUM BICARBONATE 1 MEQ/ML
1 SYRINGE (ML) INTRAVENOUS
Qty: 0 | Refills: 0 | DISCHARGE
Start: 2022-06-24

## 2022-06-24 RX ORDER — CIPROFLOXACIN LACTATE 400MG/40ML
1 VIAL (ML) INTRAVENOUS
Qty: 6 | Refills: 0
Start: 2022-06-24

## 2022-06-24 RX ORDER — MIDODRINE HYDROCHLORIDE 2.5 MG/1
1 TABLET ORAL
Qty: 90 | Refills: 0
Start: 2022-06-24

## 2022-06-24 RX ADMIN — MIDODRINE HYDROCHLORIDE 5 MILLIGRAM(S): 2.5 TABLET ORAL at 06:50

## 2022-06-24 RX ADMIN — SODIUM CHLORIDE 3 MILLILITER(S): 9 INJECTION INTRAMUSCULAR; INTRAVENOUS; SUBCUTANEOUS at 12:48

## 2022-06-24 RX ADMIN — Medication 650 MILLIGRAM(S): at 06:50

## 2022-06-24 RX ADMIN — SODIUM CHLORIDE 75 MILLILITER(S): 9 INJECTION, SOLUTION INTRAVENOUS at 00:00

## 2022-06-24 RX ADMIN — Medication 0.1 MILLIGRAM(S): at 06:50

## 2022-06-24 RX ADMIN — Medication 25 MICROGRAM(S): at 06:50

## 2022-06-24 RX ADMIN — Medication 650 MILLIGRAM(S): at 12:16

## 2022-06-24 RX ADMIN — Medication 25 MILLIGRAM(S): at 11:58

## 2022-06-24 RX ADMIN — HEPARIN SODIUM 5000 UNIT(S): 5000 INJECTION INTRAVENOUS; SUBCUTANEOUS at 06:50

## 2022-06-24 RX ADMIN — Medication 100 MILLIGRAM(S): at 09:28

## 2022-06-24 RX ADMIN — Medication 100 MILLIGRAM(S): at 01:00

## 2022-06-24 RX ADMIN — Medication 81 MILLIGRAM(S): at 11:58

## 2022-06-24 RX ADMIN — SODIUM CHLORIDE 3 MILLILITER(S): 9 INJECTION INTRAMUSCULAR; INTRAVENOUS; SUBCUTANEOUS at 07:00

## 2022-06-24 RX ADMIN — MIDODRINE HYDROCHLORIDE 5 MILLIGRAM(S): 2.5 TABLET ORAL at 11:58

## 2022-06-24 NOTE — PROCEDURE NOTE - ADDITIONAL PROCEDURE DETAILS
Bladder scan showed ~700cc of urine; pt only able to void ~200cc since procedure in small volume voids. Pt complaining of suprapubic fullness.    Using aseptic technique, area prepped in traditional sterile fashion, lidocaine urojet instilled into urethra, and 18F coude catheter placed without resistance. 800cc of clear yellow urine urine drained. 10cc sterile water placed into balloon and sow catheter secured with stat lock. pt tolerated procedure well.

## 2022-06-24 NOTE — DISCHARGE NOTE NURSING/CASE MANAGEMENT/SOCIAL WORK - NSDCPEFALRISK_GEN_ALL_CORE
For information on Fall & Injury Prevention, visit: [email or web address] OR  [email or web address] OR  https://www.cdc.gov/steadi/patient.html

## 2022-06-24 NOTE — DISCHARGE NOTE PROVIDER - HOSPITAL COURSE
Pt is a 70 yo m with a pmh of lymphoma, ckd4, hypothy, bph, hld, htn, orthostatic hypotension and bl ureteral stents who arrived at [facility] on 6/21/22  for planned left ureteroscopy, exchange of left ureteral stent, left nt removal and exchange of right ureteral stent. His case was cancelled due to HTN  He was seen by cardio and medicine and it was deemed that he was anxious as elevated BP was in stressful settings   He was seen by cardio - Dr. Goodson and Medicine Dr Weaver for clearance.  On 6/23/22 he underwent the procedure as planned.   Please see operative report  for further details.  On POD 1 he went into urinary retention and then passed his tov a few hrs later   he was dcd home to finish she preop abx

## 2022-06-24 NOTE — DISCHARGE NOTE PROVIDER - NSDCMRMEDTOKEN_GEN_ALL_CORE_FT
aspirin 81 mg oral tablet: orally once a day  atorvastatin 20 mg oral tablet: 1 tab(s) orally once a day  cholecalciferol oral tablet: 2000 unit(s) orally once a day  Cipro 250 mg oral tablet: 1 tab(s) orally every 12 hours  docusate sodium 100 mg oral tablet: 1 tab(s) orally 2 times a day  levothyroxine 25 mcg (0.025 mg) oral tablet: 1 tab(s) orally once a day  meclizine 25 mg oral tablet: 1 tab(s) orally 2 times a day  midodrine 10 mg oral tablet: 1 tab(s) orally 3 times a day  sodium bicarbonate 650 mg oral tablet: 1 tab(s) orally 3 times a day  tamsulosin 0.4 mg oral capsule: 1 cap(s) orally once a day   aspirin 81 mg oral tablet: orally once a day  atorvastatin 20 mg oral tablet: 1 tab(s) orally once a day  cholecalciferol oral tablet: 2000 unit(s) orally once a day  Cipro 250 mg oral tablet: 1 tab(s) orally every 12 hours  cloNIDine 0.1 mg oral tablet: 1 tab(s) orally 2 times a day  docusate sodium 100 mg oral tablet: 1 tab(s) orally 2 times a day  levothyroxine 25 mcg (0.025 mg) oral tablet: 1 tab(s) orally once a day  meclizine 25 mg oral tablet: 1 tab(s) orally 2 times a day  midodrine 5 mg oral tablet: 1 tab(s) orally 3 times a day  sodium bicarbonate 650 mg oral tablet: 1 tab(s) orally 3 times a day  tamsulosin 0.4 mg oral capsule: 1 cap(s) orally once a day   aspirin 81 mg oral tablet: orally once a day  atorvastatin 20 mg oral tablet: 1 tab(s) orally once a day  cholecalciferol oral tablet: 2000 unit(s) orally once a day  cloNIDine 0.1 mg oral tablet: 1 tab(s) orally 2 times a day  docusate sodium 100 mg oral tablet: 1 tab(s) orally 2 times a day  levothyroxine 25 mcg (0.025 mg) oral tablet: 1 tab(s) orally once a day  meclizine 25 mg oral tablet: 1 tab(s) orally 2 times a day  midodrine 5 mg oral tablet: 1 tab(s) orally 3 times a day  sodium bicarbonate 650 mg oral tablet: 1 tab(s) orally 3 times a day  tamsulosin 0.4 mg oral capsule: 1 cap(s) orally once a day

## 2022-06-24 NOTE — DISCHARGE NOTE PROVIDER - CARE PROVIDERS DIRECT ADDRESSES
,[email or web address],DirectAddress_Unknown

## 2022-06-24 NOTE — DISCHARGE NOTE PROVIDER - CARE PROVIDER_API CALL
[facility])  Urology  [address], [address]  [place] [address]  Phone: [phone]  Fax: [phone]  Follow Up Time:     [facility]  CARDIOVASCULAR DISEASE  [address]  [place] [address]  Phone: [phone]  Fax: [phone]  Follow Up Time:     Rosey Weaver)  Internal Medicine  [address] [place] [address]  Phone: [phone]  Fax: [phone]  Follow Up Time:

## 2022-06-24 NOTE — DISCHARGE NOTE NURSING/CASE MANAGEMENT/SOCIAL WORK - PATIENT PORTAL LINK FT
You can access the FollowMyHealth Patient Portal offered by [facility] by registering at the following website: [email or web address]. By joining [facility]’s FollowMyHealth portal, you will also be able to view your health information using other applications (apps) compatible with our system.

## 2022-06-24 NOTE — PROGRESS NOTE ADULT - SUBJECTIVE AND OBJECTIVE BOX
HPI: 71 male with history of HTN, , O.H. CKD lymphoma, vertigo, hypothyroidism, B/L ureteral stents (placed 15 years ago) was supposed to have surgery for kidney stone   noted to have elevated BP       PAST MEDICAL & SURGICAL HISTORY:  Hypertension      Vertigo      Lymphoma  chemo 2014      Chronic kidney disease, unspecified CKD stage      Hypothyroidism      Hyperlipidemia      BPH (benign prostatic hyperplasia)      H/O orthostatic hypotension      S/P urological surgery  B/L uretral stents years ago          Review of Systems:   CONSTITUTIONAL: No fever, weight loss, or fatigue  EYES: No eye pain, visual disturbances, or discharge  ENMT:  No difficulty hearing, tinnitus, vertigo; No sinus or throat pain  NECK: No pain or stiffness  BREASTS: No pain, masses, or nipple discharge  RESPIRATORY: No cough, wheezing, chills or hemoptysis; No shortness of breath  CARDIOVASCULAR: No chest pain, palpitations, dizziness, or leg swelling  GASTROINTESTINAL: No abdominal or epigastric pain. No nausea, vomiting, or hematemesis; No diarrhea or constipation. No melena or hematochezia.  GENITOURINARY: No dysuria, frequency, hematuria, or incontinence  NEUROLOGICAL: No headaches, memory loss, loss of strength, numbness, or tremors  SKIN: No itching, burning, rashes, or lesions   LYMPH NODES: No enlarged glands  ENDOCRINE: No heat or cold intolerance; No hair loss  MUSCULOSKELETAL: No joint pain or swelling; No muscle, back, or extremity pain  PSYCHIATRIC: No depression, anxiety, mood swings, or difficulty sleeping  HEME/LYMPH: No easy bruising, or bleeding gums  ALLERY AND IMMUNOLOGIC: No hives or eczema    Allergies    No Known Allergies    Intolerances        Social History:     FAMILY HISTORY:  FHx: diabetes mellitus    FHx: hypertension        MEDICATIONS  (STANDING):  aspirin  chewable 81 milliGRAM(s) Oral daily  atorvastatin 20 milliGRAM(s) Oral at bedtime  ceFAZolin   IVPB 2000 milliGRAM(s) IV Intermittent once  chlorhexidine 2% Cloths 1 Application(s) Topical once  ciprofloxacin   IVPB      ciprofloxacin   IVPB 200 milliGRAM(s) IV Intermittent every 24 hours  cloNIDine 0.1 milliGRAM(s) Oral two times a day  heparin   Injectable 5000 Unit(s) SubCutaneous every 8 hours  levothyroxine 25 MICROGram(s) Oral daily  lidocaine 1% Injectable 0.2 milliLiter(s) Local Injection once  sodium bicarbonate 650 milliGRAM(s) Oral three times a day  sodium chloride 0.9% lock flush 3 milliLiter(s) IV Push every 8 hours  tamsulosin 0.4 milliGRAM(s) Oral at bedtime    MEDICATIONS  (PRN):  meclizine 25 milliGRAM(s) Oral two times a day PRN Dizziness        CAPILLARY BLOOD GLUCOSE        I&O's Summary    21 Jun 2022 07:01  -  22 Jun 2022 00:34  --------------------------------------------------------  IN: 940 mL / OUT: 1630 mL / NET: -690 mL        PHYSICAL EXAM:  GENERAL: NAD, well-developed  HEAD:  Atraumatic, Normocephalic  EYES: EOMI, PERRLA, conjunctiva and sclera clear  NECK: Supple, No JVD  CHEST/LUNG: Clear to auscultation bilaterally; No wheeze  HEART: Regular rate and rhythm; No murmurs, rubs, or gallops  ABDOMEN: Soft, Nontender, Nondistended; Bowel sounds present  EXTREMITIES:  2+ Peripheral Pulses, No clubbing, cyanosis, or edema  PSYCH: AAOx3  NEUROLOGY: non-focal  SKIN: No rashes or lesions    LABS:                    RADIOLOGY & ADDITIONAL TESTS:    Imaging Personally Reviewed:    Consultant(s) Notes Reviewed:      Care Discussed with Consultants/Other Providers:  
 Post op Check    Pt seen and examined at bedside. Pain is controlled. Denies SOB/CP/N/V. Complaining of some mild dysuria.    Vital Signs Last 24 Hrs  T(C): 36.4 (24 Jun 2022 00:10), Max: 37.1 (23 Jun 2022 05:50)  T(F): 97.6 (24 Jun 2022 00:10), Max: 98.8 (23 Jun 2022 05:50)  HR: 87 (24 Jun 2022 00:10) (56 - 104)  BP: 131/75 (24 Jun 2022 00:10) (98/55 - 188/109)  BP(mean): 87 (23 Jun 2022 21:15) (58 - 101)  RR: 18 (24 Jun 2022 00:10) (16 - 18)  SpO2: 96% (24 Jun 2022 00:10) (96% - 100%)    I&O's Summary    22 Jun 2022 07:01  -  23 Jun 2022 07:00  --------------------------------------------------------  IN: 1645 mL / OUT: 2325 mL / NET: -680 mL    23 Jun 2022 07:01  -  24 Jun 2022 00:22  --------------------------------------------------------  IN: 750 mL / OUT: 775 mL / NET: -25 mL        Physical Exam  Gen: NAD, A&Ox3  Pulm: No respiratory distress, no subcostal retractions  Abd: Soft, NT, ND  Back: L flank c/d/i  : voiding; urine peach                          10.1   4.56  )-----------( 160      ( 23 Jun 2022 07:04 )             31.6       06-23    142  |  105  |  55<H>  ----------------------------<  147<H>  4.1   |  25  |  3.74<H>    Ca    8.2<L>      23 Jun 2022 07:04          
UROLOGY PROGRESS NOTE:     Subjective: Patient seen and examined at bedside.       Objective:  Vital signs  T(F): , Max: 98.5 (06-23-22 @ 10:05)  HR: 89 (06-24-22 @ 06:00)  BP: 115/73 (06-24-22 @ 06:00)  SpO2: 98% (06-24-22 @ 06:00)  Wt(kg): --    Output     I&O's Detail    23 Jun 2022 07:01  -  24 Jun 2022 07:00  --------------------------------------------------------  IN:    dextrose 5% + sodium chloride 0.45%: 750 mL    dextrose 5% + sodium chloride 0.45%: 600 mL    Oral Fluid: 100 mL  Total IN: 1450 mL    OUT:    Indwelling Catheter - Urethral (mL): 925 mL    Voided (mL): 975 mL  Total OUT: 1900 mL    Total NET: -450 mL          Physical Exam:  Gen: no acute distress  Back: no CVAT b/l  Abd: soft nt nd  : sow in place, urine clear yellow    Labs:                        10.9   8.68  )-----------( 177      ( 24 Jun 2022 07:24 )             34.3     06-24    142  |  103  |  57<H>  ----------------------------<  213<H>  4.9   |  21<L>  |  4.17<H>    Ca    8.4      24 Jun 2022 07:23      PT/INR - ( 23 Jun 2022 07:04 )   PT: 12.2 sec;   INR: 1.06 ratio         PTT - ( 23 Jun 2022 07:04 )  PTT:46.6 sec      
  Patient is a 71y old  Male who presents with a chief complaint of   Hydronephrosis, encrusted L stent       Procedure: L URS, poss PCNL  Surgeon:    LABS                        10.0   5.02  )-----------( 163      ( 22 Jun 2022 07:28 )             31.8       06-22    143  |  105  |  54<H>  ----------------------------<  117<H>  4.2   |  23  |  4.15<H>    Ca    8.5      22 Jun 2022 07:24         Culture - Urine (05.31.22 @ 12:41)    -  Amikacin: S <=16    -  Amoxicillin/Clavulanic Acid: R >16/8    -  Ampicillin: R >16 These ampicillin results predict results for amoxicillin    -  Ampicillin/Sulbactam: R >16/8 Enterobacter, Klebsiella aerogenes, Citrobacter, and Serratia may develop resistance during prolonged therapy (3-4 days)    -  Aztreonam: S <=4    -  Cefazolin: R >16    -  Cefepime: S <=2    -  Cefoxitin: R >16    -  Ceftriaxone: S <=1 Enterobacter, Klebsiella aerogenes, Citrobacter, and Serratia may develop resistance during prolonged therapy    -  Ciprofloxacin: S <=0.25    -  Ertapenem: S <=0.5    -  Gentamicin: S <=2    -  Imipenem: S <=1    -  Levofloxacin: S <=0.5    -  Meropenem: S <=1    -  Nitrofurantoin: I 64 Should not be used to treat pyelonephritis    -  Piperacillin/Tazobactam: S <=8    -  Tigecycline: S <=2    -  Tobramycin: S <=2    -  Trimethoprim/Sulfamethoxazole: S <=0.5/9.5    Specimen Source: .Urine Nephrostomy - Left    Culture Results:   >100,000 CFU/ml Enterobacter cloacae complex    Organism Identification: Enterobacter cloacae complex    Organism: Enterobacter cloacae complex    Method Type: SHEREE          DIAGNOSTIC PROCEDURES    Chest X-Ray:       Electrocardiogram:      Assessment and Plan  71yMale with encrusted L stent for L URS poss PCNL      IV Fluids  Diet: NPO after midnight  Consent- pending  Cleared by cardiology  COVID swab pending                            
DATE OF SERVICE: 06-22-22 @ 07:43    Subjective: Patient seen and examined. No new events except as noted.     SUBJECTIVE/ROS:  nad      MEDICATIONS:  MEDICATIONS  (STANDING):  aspirin  chewable 81 milliGRAM(s) Oral daily  atorvastatin 20 milliGRAM(s) Oral at bedtime  ceFAZolin   IVPB 2000 milliGRAM(s) IV Intermittent once  chlorhexidine 2% Cloths 1 Application(s) Topical once  ciprofloxacin   IVPB      ciprofloxacin   IVPB 200 milliGRAM(s) IV Intermittent every 24 hours  cloNIDine 0.1 milliGRAM(s) Oral two times a day  heparin   Injectable 5000 Unit(s) SubCutaneous every 8 hours  levothyroxine 25 MICROGram(s) Oral daily  lidocaine 1% Injectable 0.2 milliLiter(s) Local Injection once  midodrine. 5 milliGRAM(s) Oral three times a day  sodium bicarbonate 650 milliGRAM(s) Oral three times a day  sodium chloride 0.9% lock flush 3 milliLiter(s) IV Push every 8 hours  tamsulosin 0.4 milliGRAM(s) Oral at bedtime      PHYSICAL EXAM:  T(C): 36.4 (06-22-22 @ 05:36), Max: 36.8 (06-21-22 @ 18:36)  HR: 68 (06-22-22 @ 05:36) (68 - 86)  BP: 156/84 (06-22-22 @ 05:36) (126/72 - 173/104)  RR: 18 (06-22-22 @ 05:36) (18 - 18)  SpO2: 100% (06-22-22 @ 05:36) (98% - 100%)  Wt(kg): --  I&O's Summary    21 Jun 2022 07:01  -  22 Jun 2022 07:00  --------------------------------------------------------  IN: 1180 mL / OUT: 2380 mL / NET: -1200 mL            JVP: Normal  Neck: supple  Lung: clear   CV: S1 S2 , Murmur:  Abd: soft  Ext: No edema  neuro: Awake / alert  Psych: flat affect  Skin: normal``    LABS/DATA:    CARDIAC MARKERS:                  proBNP:   Lipid Profile:   HgA1c:   TSH:     TELE:  EKG:        
DATE OF SERVICE: 06-24-22 @ 07:25    Subjective: Patient seen and examined. No new events except as noted.     SUBJECTIVE/ROS:  No chest pain, dyspnea, palpitation, or dizziness.       MEDICATIONS:  MEDICATIONS  (STANDING):  aspirin  chewable 81 milliGRAM(s) Oral daily  atorvastatin 20 milliGRAM(s) Oral at bedtime  ciprofloxacin   IVPB 200 milliGRAM(s) IV Intermittent every 24 hours  cloNIDine 0.1 milliGRAM(s) Oral two times a day  dextrose 5% + sodium chloride 0.45%. 1000 milliLiter(s) (75 mL/Hr) IV Continuous <Continuous>  heparin   Injectable 5000 Unit(s) SubCutaneous every 8 hours  levothyroxine 25 MICROGram(s) Oral daily  midodrine. 5 milliGRAM(s) Oral three times a day  sodium bicarbonate 650 milliGRAM(s) Oral three times a day  sodium chloride 0.9% lock flush 3 milliLiter(s) IV Push every 8 hours  tamsulosin 0.4 milliGRAM(s) Oral at bedtime      PHYSICAL EXAM:  T(C): 36.9 (06-24-22 @ 06:00), Max: 36.9 (06-23-22 @ 10:05)  HR: 89 (06-24-22 @ 06:00) (56 - 104)  BP: 115/73 (06-24-22 @ 06:00) (98/55 - 188/109)  RR: 18 (06-24-22 @ 06:00) (16 - 18)  SpO2: 98% (06-24-22 @ 06:00) (95% - 100%)  Wt(kg): --  I&O's Summary    23 Jun 2022 07:01  -  24 Jun 2022 07:00  --------------------------------------------------------  IN: 850 mL / OUT: 1900 mL / NET: -1050 mL      Height (cm): 177.8 (06-23 @ 18:26)  Weight (kg): 91.6 (06-23 @ 18:26)  BMI (kg/m2): 29 (06-23 @ 18:26)  BSA (m2): 2.1 (06-23 @ 18:26)      JVP: Normal  Neck: supple  Lung: clear   CV: S1 S2 , Murmur:  Abd: soft  Ext: No edema  neuro: Awake / alert  Psych: flat affect  Skin: normal``    LABS/DATA:    CARDIAC MARKERS:                                10.1   4.56  )-----------( 160      ( 23 Jun 2022 07:04 )             31.6     06-23    142  |  105  |  55<H>  ----------------------------<  147<H>  4.1   |  25  |  3.74<H>    Ca    8.2<L>      23 Jun 2022 07:04      proBNP:   Lipid Profile:   HgA1c:   TSH:     TELE:  EKG:        
DATE OF SERVICE: 06-23-22 @ 09:07    Subjective: Patient seen and examined. No new events except as noted.     SUBJECTIVE/ROS:  feels ok   No chest pain, dyspnea, palpitation, or dizziness.       MEDICATIONS:  MEDICATIONS  (STANDING):  aspirin  chewable 81 milliGRAM(s) Oral daily  atorvastatin 20 milliGRAM(s) Oral at bedtime  ceFAZolin   IVPB 2000 milliGRAM(s) IV Intermittent once  chlorhexidine 2% Cloths 1 Application(s) Topical once  ciprofloxacin   IVPB      ciprofloxacin   IVPB 200 milliGRAM(s) IV Intermittent every 24 hours  cloNIDine 0.1 milliGRAM(s) Oral two times a day  dextrose 5% + sodium chloride 0.45%. 1000 milliLiter(s) (75 mL/Hr) IV Continuous <Continuous>  heparin   Injectable 5000 Unit(s) SubCutaneous every 8 hours  levothyroxine 25 MICROGram(s) Oral daily  lidocaine 1% Injectable 0.2 milliLiter(s) Local Injection once  midodrine. 5 milliGRAM(s) Oral three times a day  sodium bicarbonate 650 milliGRAM(s) Oral three times a day  sodium chloride 0.9% lock flush 3 milliLiter(s) IV Push every 8 hours  tamsulosin 0.4 milliGRAM(s) Oral at bedtime      PHYSICAL EXAM:  T(C): 37.1 (06-23-22 @ 05:50), Max: 37.1 (06-23-22 @ 05:50)  HR: 75 (06-23-22 @ 05:50) (74 - 79)  BP: 163/87 (06-23-22 @ 05:50) (112/71 - 163/87)  RR: 18 (06-23-22 @ 05:50) (16 - 18)  SpO2: 99% (06-23-22 @ 05:50) (99% - 100%)  Wt(kg): --  I&O's Summary    22 Jun 2022 07:01  -  23 Jun 2022 07:00  --------------------------------------------------------  IN: 1645 mL / OUT: 2325 mL / NET: -680 mL            JVP: Normal  Neck: supple  Lung: clear   CV: S1 S2 , Murmur:  Abd: soft  Ext: No edema  neuro: Awake / alert  Psych: flat affect  Skin: normal``    LABS/DATA:    CARDIAC MARKERS:                                10.1   4.56  )-----------( 160      ( 23 Jun 2022 07:04 )             31.6     06-23    142  |  105  |  55<H>  ----------------------------<  147<H>  4.1   |  25  |  3.74<H>    Ca    8.2<L>      23 Jun 2022 07:04      proBNP:   Lipid Profile:   HgA1c:   TSH:     TELE:  EKG:        
The patient was seen and examined at bedside.  No acute events overnight and the patient is without acute complaints this AM.    T(C): 37.1 (06-23-22 @ 05:50), Max: 37.1 (06-23-22 @ 05:50)  HR: 75 (06-23-22 @ 05:50) (74 - 79)  BP: 163/87 (06-23-22 @ 05:50) (112/71 - 163/87)  RR: 18 (06-23-22 @ 05:50) (16 - 18)  SpO2: 99% (06-23-22 @ 05:50) (99% - 100%)  Wt(kg): --    Physical Exam:    General: NAD, A+Ox3  Abdomen: soft, non-tender, non-distended  Back: dressing clean/dry/intact      06-22 @ 07:01  -  06-23 @ 07:00  --------------------------------------------------------  IN: 1645 mL / OUT: 2325 mL / NET: -680 mL      void - 800cc    L NT - 1050cc clear  
UROLOGY DAILY PROGRESS NOTE:     Subjective: Patient seen and examined at bedside. No overnight events.       Objective:  Vital signs  T(F): , Max: 98.3 (06-21-22 @ 18:36)  HR: 68 (06-22-22 @ 05:36)  BP: 156/84 (06-22-22 @ 05:36)  SpO2: 100% (06-22-22 @ 05:36)  Wt(kg): --    I&O's Summary    21 Jun 2022 07:01  -  22 Jun 2022 06:46  --------------------------------------------------------  IN: 1180 mL / OUT: 2380 mL / NET: -1200 mL        Gen: NAD  Pulm: No respiratory distress, no subcostal retractions  CV: RRR, no JVD  Abd: Soft, NT, ND  Back: No CVAT     Labs:                Urine Cx:

## 2022-06-24 NOTE — DISCHARGE NOTE PROVIDER - PROVIDER TOKENS
PROVIDER:[TOKEN:[[id number]]],PROVIDER:[TOKEN:[[id number]]],PROVIDER:[TOKEN:[[id number]]]

## 2022-06-24 NOTE — PROGRESS NOTE ADULT - ASSESSMENT
71 year old male admitted for uncontrolled hypertension prior to ureteroscopy- case cancelled   - Continue Clonidine  - f/u cardiology, medicine  - orthostatics q12h  - pending clearance for OR tomorrow  - cont reg diet  
71 male with history of HTN, , O.H. CKD lymphoma, vertigo, hypothyroidism, B/L ureteral stents (placed 15 years ago) was supposed to have surgery for kidney stone   noted to have elevated Blood pressure       # Nephrolithiasis Plan for left Ureteroscopy today   Medically optimized BP acceptable with hx Orthostatic Hypotension     # HTN Uncontrolled Cards consulted.   Continue with Cloniidine.   BP acceptable range with hx Orthostatic Hypotension   Continue with current dose Clonidine and Midodrine   D/W primary   Patient medically optimized fro the procedure       # Hypothyroidism Continue with Synthroid   TSH around 6   Continue with same dose       # CKD Monitor  in the setting of Renal stone         
72 y/o male with encrusted L stent for L URS, poss PCNL    -AM labs  -NPO for left ureteroscopy today  -f/u cardiology regarding high blood pressure  -f/u medicine  -OOB/DVT prophylaxis
A/P: 71y Male s/p L URS, encrusted stent removal, L stent placement, left NT removal, R stent exchange  DVT prophylaxis/OOB  Incentive spirometry  Strict I&O's  Analgesia and antiemetics as needed  Diet  pyridium prn  trend BP  f/u PVR  continue cipro  dc planning in AM
HTN  has Orthostatic Hypotension   cont clonidine   cont midodrine    needs aggressive PT     Kidney stone  fu with   has encrusted ureteral stent planned for removal     Diastolic CHF  mild   grade I   stable     Pre-Operative Cardiac Risk Stratification and Optimization  Based on patient history and physical exam, the patient is considered to have elevated risk   he has encrusted ureteral stent with hydronephrosis   can proceed to this time sensitive procedure with acceptable elevated risk , Vitals are more stable today   would not treat his supine hypertension aggressively      Advanced care planning was discussed with patient and family.  Risks, benefits and alternatives of the cardiac treatments and medical therapy including procedures were discussed in detail and all questions were answered. Importance of compliance with medical therapy and lifestyle modification to improve cardiovascular health were addressed. Appropriate forms and patient educational materials were reviewed. 30 minutes face to face spent.    
71y Male s/p L URS, encrusted stent removal, L stent placement, left NT removal, R stent exchange POD#1    -tov  -discharge planning for today
HTN  has Orthostatic Hypotension   cont clonidine   add midodrine      Kidney stone  fu with   has encrusted ureteral stent planned for removal     Diastolic CHF  mild   grade I   stable     Pre-Operative Cardiac Risk Stratification and Optimization  Based on patient history and physical exam, the patient is considered to have elevated risk   he has encrusted ureteral stent with hydronephrosis   can proceed to this time sensitive procedure with acceptable elevated risk , Vitals are more stable      Advanced care planning was discussed with patient and family.  Risks, benefits and alternatives of the cardiac treatments and medical therapy including procedures were discussed in detail and all questions were answered. Importance of compliance with medical therapy and lifestyle modification to improve cardiovascular health were addressed. Appropriate forms and patient educational materials were reviewed. 30 minutes face to face spent.    
HTN  has Orthostatic Hypotension   cont clonidine   cont midodrine    needs aggressive PT     Kidney stone  fu with     Diastolic CHF  mild   grade I   stable

## 2022-06-24 NOTE — DISCHARGE NOTE PROVIDER - NSDCCPCAREPLAN_GEN_ALL_CORE_FT
PRINCIPAL DISCHARGE DIAGNOSIS  Diagnosis: Nephrolithiasis  Assessment and Plan of Treatment: you had your old left mephrosomy tube and stent removed. A new left stent was placed and your right stent was exchanged.  Stents are not permanent.  Please finish the Cipro that you have at home  , continue to walk frequently, return to daily living activities slowly, no heavy lifting greater than 10lbs for 4-6 weeks, follow up Dr Desir for further stent plans within two weeks         SECONDARY DISCHARGE DIAGNOSES  Diagnosis: Orthostatic hypertension  Assessment and Plan of Treatment: Please continue to take both your clonidine and Midodrine at home   Please see your primary care doctor within one week of discharge for a blood pressure check,   You were seen in the hospital by the cardiologi Dr. Goodson and the Internist Dr. Weaver.

## 2022-06-29 NOTE — ED PROVIDER NOTE - CONDITION AT DISCHARGE:
General Discharge Instructions:  Please resume all regular home medications unless specifically advised not to take a particular medication. Also, please take any new medications as prescribed.  Please get plenty of rest, continue to ambulate several times per day, and drink adequate amounts of fluids. Avoid lifting weights greater than 5-10 lbs until you follow-up with your surgeon, who will instruct you further regarding activity restrictions.  Avoid driving or operating heavy machinery while taking pain medications.  Please follow-up with your surgeon and Primary Care Provider (PCP) as advised.  Incision Care:  *Please call your doctor, nurse practitioner, or PA if you have increased pain, swelling, redness, or drainage from the incision site.  *Avoid swimming and baths until your follow-up appointment.  *You may shower, and wash surgical incisions with a mild soap and warm water. Gently pat the area dry.  *If you have staples, they will be removed at your follow-up appointment.  *If you have steri-strips, they will fall off on their own. Please remove any remaining strips 7-10 days after surgery.  Warning Signs:  Please call your doctor, nurse practitioner or PA if you are experiencing the following:  *You experience new chest pain, pressure, squeezing or tightness.  *New or worsening cough, shortness of breath, or wheeze.  *If you are vomiting and cannot keep down fluids or your medications.  *You are getting dehydrated due to continued vomiting, diarrhea, or other reasons. Signs of dehydration include dry mouth, rapid heartbeat, or feeling dizzy or faint when standing.  *You see blood or dark/black material when you vomit or have a bowel movement.  *You experience burning when you urinate, have blood in your urine, or experience a discharge.  *Your pain is not improving within 8-12 hours or is not gone within 24 hours. Call or return immediately if your pain is getting worse, changes location, or moves to your chest or back.  *You have shaking, chills, or fever greater than 100.5 degrees Fahrenheit or 38 degrees Celsius.  *Any change in your symptoms, or any new symptoms that concern you.   Improved

## 2022-07-01 LAB — NIDUS STONE QN: SIGNIFICANT CHANGE UP

## 2022-07-03 LAB — SURGICAL PATHOLOGY STUDY: SIGNIFICANT CHANGE UP

## 2022-07-06 ENCOUNTER — APPOINTMENT (OUTPATIENT)
Dept: UROLOGY | Facility: CLINIC | Age: 72
End: 2022-07-06

## 2022-07-06 VITALS
TEMPERATURE: 98.2 F | DIASTOLIC BLOOD PRESSURE: 98 MMHG | HEIGHT: 70 IN | HEART RATE: 94 BPM | WEIGHT: 204 LBS | BODY MASS INDEX: 29.2 KG/M2 | RESPIRATION RATE: 16 BRPM | SYSTOLIC BLOOD PRESSURE: 158 MMHG

## 2022-07-06 PROCEDURE — 99213 OFFICE O/P EST LOW 20 MIN: CPT

## 2022-07-06 NOTE — HISTORY OF PRESENT ILLNESS
[FreeTextEntry1] : Patient is here for evaluation\par s/p LEFT URS laser lith, removal of retained left ureteral stent and change of bilateral ureteral stents and removal of left nephrostomy removal\par Pt feels well\par tolerating new bilateral ureteral stents \par \par stone analysis of left ureteral stone: 60% Calcium oxalate monohydrate and 40% Calcium oxalate dihydrate\par \par old nephrostomy site healed well

## 2022-07-06 NOTE — PHYSICAL EXAM
[General Appearance - Well Developed] : well developed [Normal Appearance] : normal appearance [General Appearance - Well Nourished] : well nourished [Well Groomed] : well groomed [General Appearance - In No Acute Distress] : no acute distress [Abdomen Soft] : soft [Abdomen Tenderness] : non-tender [Costovertebral Angle Tenderness] : no ~M costovertebral angle tenderness [Urinary Bladder Findings] : the bladder was normal on palpation [Skin Color & Pigmentation] : normal skin color and pigmentation [Edema] : no peripheral edema [] : no respiratory distress [Respiration, Rhythm And Depth] : normal respiratory rhythm and effort [Exaggerated Use Of Accessory Muscles For Inspiration] : no accessory muscle use [Oriented To Time, Place, And Person] : oriented to person, place, and time [Affect] : the affect was normal [Mood] : the mood was normal [Not Anxious] : not anxious [Normal Station and Gait] : the gait and station were normal for the patient's age

## 2022-09-08 ENCOUNTER — OUTPATIENT (OUTPATIENT)
Dept: OUTPATIENT SERVICES | Facility: HOSPITAL | Age: 72
LOS: 1 days | End: 2022-09-08
Payer: COMMERCIAL

## 2022-09-08 VITALS
HEIGHT: 70 IN | OXYGEN SATURATION: 98 % | DIASTOLIC BLOOD PRESSURE: 86 MMHG | SYSTOLIC BLOOD PRESSURE: 129 MMHG | RESPIRATION RATE: 18 BRPM | TEMPERATURE: 98 F | WEIGHT: 201.94 LBS | HEART RATE: 86 BPM

## 2022-09-08 DIAGNOSIS — E78.5 HYPERLIPIDEMIA, UNSPECIFIED: ICD-10-CM

## 2022-09-08 DIAGNOSIS — Z01.818 ENCOUNTER FOR OTHER PREPROCEDURAL EXAMINATION: ICD-10-CM

## 2022-09-08 DIAGNOSIS — N13.30 UNSPECIFIED HYDRONEPHROSIS: ICD-10-CM

## 2022-09-08 DIAGNOSIS — Z98.890 OTHER SPECIFIED POSTPROCEDURAL STATES: Chronic | ICD-10-CM

## 2022-09-08 DIAGNOSIS — E11.9 TYPE 2 DIABETES MELLITUS WITHOUT COMPLICATIONS: ICD-10-CM

## 2022-09-08 DIAGNOSIS — N18.9 CHRONIC KIDNEY DISEASE, UNSPECIFIED: ICD-10-CM

## 2022-09-08 LAB
A1C WITH ESTIMATED AVERAGE GLUCOSE RESULT: 6.9 % — HIGH (ref 4–5.6)
ANION GAP SERPL CALC-SCNC: 14 MMOL/L — SIGNIFICANT CHANGE UP (ref 5–17)
BUN SERPL-MCNC: 54 MG/DL — HIGH (ref 7–23)
CALCIUM SERPL-MCNC: 8.8 MG/DL — SIGNIFICANT CHANGE UP (ref 8.4–10.5)
CHLORIDE SERPL-SCNC: 105 MMOL/L — SIGNIFICANT CHANGE UP (ref 96–108)
CO2 SERPL-SCNC: 24 MMOL/L — SIGNIFICANT CHANGE UP (ref 22–31)
CREAT SERPL-MCNC: 3.94 MG/DL — HIGH (ref 0.5–1.3)
EGFR: 15 ML/MIN/1.73M2 — LOW
ESTIMATED AVERAGE GLUCOSE: 151 MG/DL — HIGH (ref 68–114)
GLUCOSE SERPL-MCNC: 221 MG/DL — HIGH (ref 70–99)
HCT VFR BLD CALC: 35.1 % — LOW (ref 39–50)
HGB BLD-MCNC: 11.2 G/DL — LOW (ref 13–17)
MCHC RBC-ENTMCNC: 29.6 PG — SIGNIFICANT CHANGE UP (ref 27–34)
MCHC RBC-ENTMCNC: 31.9 GM/DL — LOW (ref 32–36)
MCV RBC AUTO: 92.9 FL — SIGNIFICANT CHANGE UP (ref 80–100)
NRBC # BLD: 0 /100 WBCS — SIGNIFICANT CHANGE UP (ref 0–0)
PLATELET # BLD AUTO: 183 K/UL — SIGNIFICANT CHANGE UP (ref 150–400)
POTASSIUM SERPL-MCNC: 4.3 MMOL/L — SIGNIFICANT CHANGE UP (ref 3.5–5.3)
POTASSIUM SERPL-SCNC: 4.3 MMOL/L — SIGNIFICANT CHANGE UP (ref 3.5–5.3)
RBC # BLD: 3.78 M/UL — LOW (ref 4.2–5.8)
RBC # FLD: 15.8 % — HIGH (ref 10.3–14.5)
SODIUM SERPL-SCNC: 143 MMOL/L — SIGNIFICANT CHANGE UP (ref 135–145)
WBC # BLD: 6.6 K/UL — SIGNIFICANT CHANGE UP (ref 3.8–10.5)
WBC # FLD AUTO: 6.6 K/UL — SIGNIFICANT CHANGE UP (ref 3.8–10.5)

## 2022-09-08 PROCEDURE — 87077 CULTURE AEROBIC IDENTIFY: CPT

## 2022-09-08 PROCEDURE — 85027 COMPLETE CBC AUTOMATED: CPT

## 2022-09-08 PROCEDURE — 87186 SC STD MICRODIL/AGAR DIL: CPT

## 2022-09-08 PROCEDURE — 80048 BASIC METABOLIC PNL TOTAL CA: CPT

## 2022-09-08 PROCEDURE — 36415 COLL VENOUS BLD VENIPUNCTURE: CPT

## 2022-09-08 PROCEDURE — G0463: CPT

## 2022-09-08 PROCEDURE — 83036 HEMOGLOBIN GLYCOSYLATED A1C: CPT

## 2022-09-08 PROCEDURE — 87086 URINE CULTURE/COLONY COUNT: CPT

## 2022-09-08 RX ORDER — CEFAZOLIN SODIUM 1 G
2000 VIAL (EA) INJECTION ONCE
Refills: 0 | Status: DISCONTINUED | OUTPATIENT
Start: 2022-09-29 | End: 2022-10-13

## 2022-09-08 RX ORDER — SODIUM CHLORIDE 9 MG/ML
3 INJECTION INTRAMUSCULAR; INTRAVENOUS; SUBCUTANEOUS EVERY 8 HOURS
Refills: 0 | Status: DISCONTINUED | OUTPATIENT
Start: 2022-09-29 | End: 2022-09-29

## 2022-09-08 RX ORDER — LIDOCAINE HCL 20 MG/ML
0.2 VIAL (ML) INJECTION ONCE
Refills: 0 | Status: DISCONTINUED | OUTPATIENT
Start: 2022-09-29 | End: 2022-09-29

## 2022-09-08 NOTE — H&P PST ADULT - HISTORY OF PRESENT ILLNESS
This is a 70 y/o male PMH lymphoma, S/P chemo via right chest mediport 2014, mediport remains in place, chronic diastolic CHF, CKD stage 4, hypothyroidism, S/P bilateral ureteral stents placement 15 years ago.  The patient was sent to the [facility] ER in [place] from his urologist's office with severe left hydronephrosis and RUTH,  the right stent was exchanged without difficulty, the left stent is retained and encrusted,.  The patient became hypoxic after sedation for procedure requiring intubation; however, recovered quickly and was not thought to be due to a primary pulmonary issue.  The patient had orthostatic hypotension while admitted to [facility] in April 2022, had cardiology consult with Dr. Goodson, is on midodrine.  The patient had  urine culture 5/17/22 positive for enterococcus faecalis, treated with Cipro.  Presents today for cystoscopy, left ureteroscopy, removal of retained encrusted ureteral stent fragment, possible percutaneous antegrade approach, bilateral ureteral stent exchange.    No H/O COVID infection, COVID swab scheduled for 6/19/22 at [facility] This is a 72 y/o male PMH lymphoma, S/P chemo via right chest mediport 2014, mediport remains in place, chronic diastolic CHF, CKD stage 4, hypothyroidism, S/P bilateral ureteral stents placement 15 years ago.  The patient was sent to the [facility] ER in [place] from his urologist's office with severe left hydronephrosis and RUTH,  the right stent was exchanged without difficulty, the left stent is retained and encrusted,.  The patient became hypoxic after sedation for procedure requiring intubation; however, recovered quickly and was not thought to be due to a primary pulmonary issue.  The patient had orthostatic hypotension while admitted to [facility] in April 2022, had cardiology consult with Dr. Goodson, was  on midodrine.  6/2022 s/p removal of retained left ureteral stent and change bilateral ureteral stents and removal of left nephrostomy , he presents today as a routine bilateral stents change every 3-4 months on 9/29/22  covid swab to be done 9/26 at [facility], denies recent travel or covid infection This is a 72 y/o male PMH lymphoma, S/P chemo via right chest mediport 2014, mediport remains in place,, CKD , hypothyroidism, S/P bilateral ureteral stents placement 15 years ago.  The patient was sent to the [facility] ER in [place] from his urologist's office with severe left hydronephrosis and RUTH,  the right stent was exchanged without difficulty, the left stent is retained and encrusted,.  The patient became hypoxic after sedation for procedure requiring intubation; however, recovered quickly and was not thought to be due to a primary pulmonary issue.  The patient had orthostatic hypotension while admitted to [facility] in April 2022, had cardiology consult with Dr. Goodson, was  on midodrine.  6/2022 s/p removal of retained left ureteral stent and change bilateral ureteral stents and removal of left nephrostomy , he presents today as a routine bilateral stents change every 3-4 months on 9/29/22  covid swab to be done 9/26 at [facility], denies recent travel or covid infection

## 2022-09-08 NOTE — H&P PST ADULT - PROBLEM SELECTOR PLAN 1
cystoscopy  bilateral ureteral stents change  pt will see PMD for preop evaluation, report to be obtained

## 2022-09-08 NOTE — H&P PST ADULT - NSICDXPASTMEDICALHX_GEN_ALL_CORE_FT
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

## 2022-09-08 NOTE — H&P PST ADULT - HEALTH CARE MAINTENANCE
Sees PCP routinely for medical management, received flu vaccine Sees PCP routinely for medical management, received flu vaccine , moderna vaccine x 3

## 2022-09-15 PROBLEM — E55.9 VITAMIN D DEFICIENCY, UNSPECIFIED: Chronic | Status: ACTIVE | Noted: 2022-09-08

## 2022-09-15 PROBLEM — H54.7 UNSPECIFIED VISUAL LOSS: Chronic | Status: ACTIVE | Noted: 2022-09-08

## 2022-09-15 PROBLEM — E11.9 TYPE 2 DIABETES MELLITUS WITHOUT COMPLICATIONS: Chronic | Status: ACTIVE | Noted: 2022-09-08

## 2022-09-26 ENCOUNTER — OUTPATIENT (OUTPATIENT)
Dept: OUTPATIENT SERVICES | Facility: HOSPITAL | Age: 72
LOS: 1 days | End: 2022-09-26
Payer: COMMERCIAL

## 2022-09-26 DIAGNOSIS — Z11.52 ENCOUNTER FOR SCREENING FOR COVID-19: ICD-10-CM

## 2022-09-26 DIAGNOSIS — Z98.890 OTHER SPECIFIED POSTPROCEDURAL STATES: Chronic | ICD-10-CM

## 2022-09-26 DIAGNOSIS — Z96.0 PRESENCE OF UROGENITAL IMPLANTS: Chronic | ICD-10-CM

## 2022-09-26 LAB — SARS-COV-2 RNA SPEC QL NAA+PROBE: SIGNIFICANT CHANGE UP

## 2022-09-28 ENCOUNTER — TRANSCRIPTION ENCOUNTER (OUTPATIENT)
Age: 72
End: 2022-09-28

## 2022-09-29 ENCOUNTER — TRANSCRIPTION ENCOUNTER (OUTPATIENT)
Age: 72
End: 2022-09-29

## 2022-09-29 ENCOUNTER — APPOINTMENT (OUTPATIENT)
Dept: UROLOGY | Facility: HOSPITAL | Age: 72
End: 2022-09-29

## 2022-09-29 ENCOUNTER — OUTPATIENT (OUTPATIENT)
Dept: INPATIENT UNIT | Facility: HOSPITAL | Age: 72
LOS: 1 days | Discharge: HOME | End: 2022-09-29
Payer: COMMERCIAL

## 2022-09-29 VITALS
DIASTOLIC BLOOD PRESSURE: 72 MMHG | SYSTOLIC BLOOD PRESSURE: 151 MMHG | RESPIRATION RATE: 18 BRPM | HEART RATE: 78 BPM | OXYGEN SATURATION: 99 %

## 2022-09-29 VITALS
WEIGHT: 201.94 LBS | HEART RATE: 84 BPM | TEMPERATURE: 98 F | DIASTOLIC BLOOD PRESSURE: 100 MMHG | OXYGEN SATURATION: 100 % | SYSTOLIC BLOOD PRESSURE: 197 MMHG | HEIGHT: 70 IN | RESPIRATION RATE: 18 BRPM

## 2022-09-29 DIAGNOSIS — N13.30 UNSPECIFIED HYDRONEPHROSIS: ICD-10-CM

## 2022-09-29 DIAGNOSIS — Z96.0 PRESENCE OF UROGENITAL IMPLANTS: Chronic | ICD-10-CM

## 2022-09-29 DIAGNOSIS — Z98.890 OTHER SPECIFIED POSTPROCEDURAL STATES: Chronic | ICD-10-CM

## 2022-09-29 DIAGNOSIS — N18.9 CHRONIC KIDNEY DISEASE, UNSPECIFIED: ICD-10-CM

## 2022-09-29 LAB
GLUCOSE BLDC GLUCOMTR-MCNC: 122 MG/DL — HIGH (ref 70–99)
GLUCOSE BLDC GLUCOMTR-MCNC: 129 MG/DL — HIGH (ref 70–99)
POTASSIUM BLDV-SCNC: 4.1 MMOL/L — SIGNIFICANT CHANGE UP (ref 3.5–5.1)

## 2022-09-29 PROCEDURE — 52332 CYSTOSCOPY AND TREATMENT: CPT | Mod: 50

## 2022-09-29 PROCEDURE — 82962 GLUCOSE BLOOD TEST: CPT

## 2022-09-29 PROCEDURE — 76000 FLUOROSCOPY <1 HR PHYS/QHP: CPT

## 2022-09-29 PROCEDURE — U0005: CPT

## 2022-09-29 PROCEDURE — 74420 UROGRAPHY RTRGR +-KUB: CPT | Mod: 26

## 2022-09-29 PROCEDURE — U0003: CPT

## 2022-09-29 PROCEDURE — C1758: CPT

## 2022-09-29 PROCEDURE — C9803: CPT

## 2022-09-29 PROCEDURE — 84132 ASSAY OF SERUM POTASSIUM: CPT

## 2022-09-29 PROCEDURE — C1769: CPT

## 2022-09-29 PROCEDURE — C2625: CPT

## 2022-09-29 DEVICE — URETERAL CATH SOF-FLEX OPEN END 6FR .040" X 70CM: Type: IMPLANTABLE DEVICE | Site: BILATERAL | Status: FUNCTIONAL

## 2022-09-29 DEVICE — GUIDEWIRE SENSOR DUAL-FLEX NITINOL STRAIGHT .035" X 150CM: Type: IMPLANTABLE DEVICE | Site: BILATERAL | Status: FUNCTIONAL

## 2022-09-29 DEVICE — STENT URET INLAY 8FRX26CM 1USE W/O GD WIRE: Type: IMPLANTABLE DEVICE | Site: BILATERAL | Status: FUNCTIONAL

## 2022-09-29 RX ORDER — CIPROFLOXACIN LACTATE 400MG/40ML
1 VIAL (ML) INTRAVENOUS
Qty: 5 | Refills: 0
Start: 2022-09-29 | End: 2022-10-03

## 2022-09-29 RX ORDER — ONDANSETRON 8 MG/1
4 TABLET, FILM COATED ORAL ONCE
Refills: 0 | Status: DISCONTINUED | OUTPATIENT
Start: 2022-09-29 | End: 2022-09-29

## 2022-09-29 RX ORDER — HYDROMORPHONE HYDROCHLORIDE 2 MG/ML
0.25 INJECTION INTRAMUSCULAR; INTRAVENOUS; SUBCUTANEOUS
Refills: 0 | Status: DISCONTINUED | OUTPATIENT
Start: 2022-09-29 | End: 2022-09-29

## 2022-09-29 NOTE — ASU DISCHARGE PLAN (ADULT/PEDIATRIC) - CARE PROVIDER_API CALL
Le Desir)  Urology  [address] [place] [address]  Phone: [phone]  Fax: [phone]  Follow Up Time:

## 2022-09-29 NOTE — ASU DISCHARGE PLAN (ADULT/PEDIATRIC) - ASU DC SPECIAL INSTRUCTIONSFT
You may take over the counter pain medicine as needed for pain.    Please complete course of antibiotics which was prescribed preoperatively.    You may have intermittent blood tinged urine and slight flank pain when you urinate.  This is normal and due to the stent in your ureter.   If your urine becomes bright red or with clots, please call the office.     Followup with Dr. Desir at your usual appointment interval. Please call to schedule an appointment.

## 2022-09-29 NOTE — PRE-ANESTHESIA EVALUATION ADULT - NSANTHASARD_GEN_ALL_CORE
Virtual Telephone Check-In    Saeed Donald verbally consents to a Virtual/Telephone Check-In visit on 05/15/21. Patient has been referred to the [facility] website at [email or web address] to review the yearly Consent to Treat document.     Patient Thoreau Suspension, 2 sprays by Nasal route daily. , Disp: 1 Bottle, Rfl: 6    No current facility-administered medications on file prior to visit. Review of Systems :  Review of Systems   Constitutional: Negative for chills and fever.    HENT: Positive for con Patient/Caregiver Education: There are no barriers to learning. Medical education done. Outcome: Patient verbalizes understanding.     Problem List:  Patient Active Problem List:     HGSIL (high grade squamous intraepithelial lesion) on Pap smear of cervix 3

## 2022-09-29 NOTE — ASU PATIENT PROFILE, ADULT - NSICDXPASTMEDICALHX_GEN_ALL_CORE_FT
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

## 2022-09-29 NOTE — ASU PATIENT PROFILE, ADULT - FALL HARM RISK - HARM RISK INTERVENTIONS

## 2022-09-29 NOTE — ASU DISCHARGE PLAN (ADULT/PEDIATRIC) - NS MD DC FALL RISK RISK
For information on Fall & Injury Prevention, visit: [email or web address] OR  [email or web address] OR  https://www.cdc.gov/steadi/patient.html

## 2022-11-09 ENCOUNTER — APPOINTMENT (OUTPATIENT)
Dept: UROLOGY | Facility: CLINIC | Age: 72
End: 2022-11-09

## 2022-11-09 PROCEDURE — 99442: CPT

## 2022-11-09 NOTE — HISTORY OF PRESENT ILLNESS
[Home] : at home, [unfilled] , at the time of the visit. [Medical Office: ([facility])___] : at the medical office located in  [Verbal consent obtained from patient] : the patient, [unfilled] [FreeTextEntry1] : See note from last visit\par Stents changed September 29, 2022\par \par Patient tolerating stents well so far\par No complaints\par \par No infections since last encounter\par \par Plan next stent change about every 3 to 4 months\par Next bilateral ureteral stents change due January 2023

## 2022-12-29 ENCOUNTER — OUTPATIENT (OUTPATIENT)
Dept: OUTPATIENT SERVICES | Facility: HOSPITAL | Age: 72
LOS: 1 days | End: 2022-12-29
Payer: COMMERCIAL

## 2022-12-29 VITALS
OXYGEN SATURATION: 100 % | DIASTOLIC BLOOD PRESSURE: 88 MMHG | HEART RATE: 88 BPM | SYSTOLIC BLOOD PRESSURE: 150 MMHG | HEIGHT: 70 IN | WEIGHT: 201.06 LBS | RESPIRATION RATE: 16 BRPM | TEMPERATURE: 98 F

## 2022-12-29 DIAGNOSIS — E11.9 TYPE 2 DIABETES MELLITUS WITHOUT COMPLICATIONS: ICD-10-CM

## 2022-12-29 DIAGNOSIS — Z98.890 OTHER SPECIFIED POSTPROCEDURAL STATES: Chronic | ICD-10-CM

## 2022-12-29 DIAGNOSIS — E03.9 HYPOTHYROIDISM, UNSPECIFIED: ICD-10-CM

## 2022-12-29 DIAGNOSIS — E78.5 HYPERLIPIDEMIA, UNSPECIFIED: ICD-10-CM

## 2022-12-29 DIAGNOSIS — Z96.0 PRESENCE OF UROGENITAL IMPLANTS: Chronic | ICD-10-CM

## 2022-12-29 DIAGNOSIS — Z01.818 ENCOUNTER FOR OTHER PREPROCEDURAL EXAMINATION: ICD-10-CM

## 2022-12-29 DIAGNOSIS — N13.30 UNSPECIFIED HYDRONEPHROSIS: ICD-10-CM

## 2022-12-29 LAB
A1C WITH ESTIMATED AVERAGE GLUCOSE RESULT: 7.3 % — HIGH (ref 4–5.6)
ANION GAP SERPL CALC-SCNC: 12 MMOL/L — SIGNIFICANT CHANGE UP (ref 5–17)
BUN SERPL-MCNC: 51 MG/DL — HIGH (ref 7–23)
CALCIUM SERPL-MCNC: 8.3 MG/DL — LOW (ref 8.4–10.5)
CHLORIDE SERPL-SCNC: 107 MMOL/L — SIGNIFICANT CHANGE UP (ref 96–108)
CO2 SERPL-SCNC: 24 MMOL/L — SIGNIFICANT CHANGE UP (ref 22–31)
CREAT SERPL-MCNC: 3.91 MG/DL — HIGH (ref 0.5–1.3)
EGFR: 16 ML/MIN/1.73M2 — LOW
ESTIMATED AVERAGE GLUCOSE: 163 MG/DL — HIGH (ref 68–114)
GLUCOSE SERPL-MCNC: 121 MG/DL — HIGH (ref 70–99)
HCT VFR BLD CALC: 35.2 % — LOW (ref 39–50)
HGB BLD-MCNC: 11.1 G/DL — LOW (ref 13–17)
MCHC RBC-ENTMCNC: 29.7 PG — SIGNIFICANT CHANGE UP (ref 27–34)
MCHC RBC-ENTMCNC: 31.5 GM/DL — LOW (ref 32–36)
MCV RBC AUTO: 94.1 FL — SIGNIFICANT CHANGE UP (ref 80–100)
NRBC # BLD: 0 /100 WBCS — SIGNIFICANT CHANGE UP (ref 0–0)
PLATELET # BLD AUTO: 219 K/UL — SIGNIFICANT CHANGE UP (ref 150–400)
POTASSIUM SERPL-MCNC: 4.2 MMOL/L — SIGNIFICANT CHANGE UP (ref 3.5–5.3)
POTASSIUM SERPL-SCNC: 4.2 MMOL/L — SIGNIFICANT CHANGE UP (ref 3.5–5.3)
RBC # BLD: 3.74 M/UL — LOW (ref 4.2–5.8)
RBC # FLD: 13.6 % — SIGNIFICANT CHANGE UP (ref 10.3–14.5)
SODIUM SERPL-SCNC: 143 MMOL/L — SIGNIFICANT CHANGE UP (ref 135–145)
WBC # BLD: 5.15 K/UL — SIGNIFICANT CHANGE UP (ref 3.8–10.5)
WBC # FLD AUTO: 5.15 K/UL — SIGNIFICANT CHANGE UP (ref 3.8–10.5)

## 2022-12-29 PROCEDURE — 80048 BASIC METABOLIC PNL TOTAL CA: CPT

## 2022-12-29 PROCEDURE — 83036 HEMOGLOBIN GLYCOSYLATED A1C: CPT

## 2022-12-29 PROCEDURE — 85027 COMPLETE CBC AUTOMATED: CPT

## 2022-12-29 PROCEDURE — G0463: CPT

## 2022-12-29 PROCEDURE — 87077 CULTURE AEROBIC IDENTIFY: CPT

## 2022-12-29 PROCEDURE — 87186 SC STD MICRODIL/AGAR DIL: CPT

## 2022-12-29 PROCEDURE — 87086 URINE CULTURE/COLONY COUNT: CPT

## 2022-12-29 NOTE — H&P PST ADULT - PROBLEM SELECTOR PLAN 1
Cystoscopy, bilateral ureteral stent exchange  Labs- CBC, BMP, HA1C, urine C&S  Pre op instructions discussed  Stop aspirin 7 days prior to OR as per Surgeon

## 2022-12-29 NOTE — H&P PST ADULT - HISTORY OF PRESENT ILLNESS
71 y/o male PMH lymphoma, S/P chemo via right chest mediport (2014) mediport remains in place,, CKD , hypothyroidism, S/P bilateral ureteral stents placement 15 years ago.  The patient was sent to the [facility] ER in [place] from his urologist's office with severe left hydronephrosis and RUTH,  the right stent was exchanged without difficulty, the left stent is retained and encrusted,.  The patient became hypoxic after sedation for procedure requiring intubation; however, recovered quickly and was not thought to be due to a primary pulmonary issue.  The patient had orthostatic hypotension while admitted to [facility] in April 2022, had cardiology consult with Dr. Goodson, was  on midodrine.  6/2022 s/p removal of retained left ureteral stent and change bilateral ureteral stents and removal of left nephrostomy , he presents today as a routine bilateral stents change every 3-4 months, last stent exchange on 9/29/22. Scheduled for cystoscopy, bilateral ureteral stent exchange on 01/19/2023  **covid swab to be done on 1/16/2023 at [facility],  ** denies any fever, chills, s/s UTI, recent travel or covid infection 71 y/o male PMH lymphoma, S/P chemo via right chest mediport (2014) mediport remains in place, CKD , hypothyroidism, hydronephrosis- presents today as a routine bilateral stents change every 3-4 months, last stent exchange on 9/29/22. Scheduled for cystoscopy, bilateral ureteral stent exchange on 01/19/2023  **covid swab to be done on 1/16/2023 at [facility],  ** denies any fever, chills, s/s UTI, recent travel or covid infection  **Of note pt has h/o bilateral ureteral stents placement 15 years ago.  The patient was sent to the [facility] ER in [place] from his urologist's office with severe left hydronephrosis and RUTH,  the right stent was exchanged without difficulty, the left stent is retained and encrusted,.  The patient became hypoxic after sedation for procedure requiring intubation; however, recovered quickly and was not thought to be due to a primary pulmonary issue.  The patient had orthostatic hypotension while admitted to [facility] in April 2022, had cardiology consult with Dr. Goodson, was  on midodrine.  6/2022 s/p removal of retained left ureteral stent and change bilateral ureteral stents and removal of left nephrostomy ,

## 2022-12-29 NOTE — H&P PST ADULT - PROBLEM SELECTOR PROBLEM 4
PRIMARY DIAGNOSIS: Social Concerns  OUTPATIENT/OBSERVATION GOALS TO BE MET BEFORE DISCHARGE:  ADLs back to baseline: Yes    Activity and level of assistance: Ax1 in bed, baseline    Pain status: Pain free.    Return to near baseline physical activity: Yes     Discharge Planner Nurse   Safe discharge environment identified: Yes - [facility]  Barriers to discharge: Yes - [facility], safe discharge disposition       Entered by: Celina Adkins RN 10/28/2022      Please review provider order for any additional goals.   Nurse to notify provider when observation goals have been met and patient is ready for discharge.     Type 2 diabetes mellitus

## 2022-12-29 NOTE — H&P PST ADULT - NSICDXPASTMEDICALHX_GEN_ALL_CORE_FT
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

## 2022-12-29 NOTE — H&P PST ADULT - EYES COMMENTS
Pt was brought by ems for high blood pressure.  Pt was found sleeping behind wheel of car by police, pt chose  to come to ED
right eye blind

## 2023-01-02 LAB
-  AMIKACIN: SIGNIFICANT CHANGE UP
-  AMOXICILLIN/CLAVULANIC ACID: SIGNIFICANT CHANGE UP
-  AMPICILLIN/SULBACTAM: SIGNIFICANT CHANGE UP
-  AMPICILLIN: SIGNIFICANT CHANGE UP
-  AZTREONAM: SIGNIFICANT CHANGE UP
-  CEFAZOLIN: SIGNIFICANT CHANGE UP
-  CEFEPIME: SIGNIFICANT CHANGE UP
-  CEFOXITIN: SIGNIFICANT CHANGE UP
-  CEFTRIAXONE: SIGNIFICANT CHANGE UP
-  CIPROFLOXACIN: SIGNIFICANT CHANGE UP
-  ERTAPENEM: SIGNIFICANT CHANGE UP
-  GENTAMICIN: SIGNIFICANT CHANGE UP
-  IMIPENEM: SIGNIFICANT CHANGE UP
-  LEVOFLOXACIN: SIGNIFICANT CHANGE UP
-  MEROPENEM: SIGNIFICANT CHANGE UP
-  NITROFURANTOIN: SIGNIFICANT CHANGE UP
-  PIPERACILLIN/TAZOBACTAM: SIGNIFICANT CHANGE UP
-  TOBRAMYCIN: SIGNIFICANT CHANGE UP
-  TRIMETHOPRIM/SULFAMETHOXAZOLE: SIGNIFICANT CHANGE UP
CULTURE RESULTS: SIGNIFICANT CHANGE UP
METHOD TYPE: SIGNIFICANT CHANGE UP
ORGANISM # SPEC MICROSCOPIC CNT: SIGNIFICANT CHANGE UP
ORGANISM # SPEC MICROSCOPIC CNT: SIGNIFICANT CHANGE UP
SPECIMEN SOURCE: SIGNIFICANT CHANGE UP

## 2023-01-16 ENCOUNTER — OUTPATIENT (OUTPATIENT)
Dept: OUTPATIENT SERVICES | Facility: HOSPITAL | Age: 73
LOS: 1 days | End: 2023-01-16
Payer: COMMERCIAL

## 2023-01-16 DIAGNOSIS — Z96.0 PRESENCE OF UROGENITAL IMPLANTS: Chronic | ICD-10-CM

## 2023-01-16 DIAGNOSIS — Z11.52 ENCOUNTER FOR SCREENING FOR COVID-19: ICD-10-CM

## 2023-01-16 LAB — SARS-COV-2 RNA SPEC QL NAA+PROBE: SIGNIFICANT CHANGE UP

## 2023-01-18 ENCOUNTER — TRANSCRIPTION ENCOUNTER (OUTPATIENT)
Age: 73
End: 2023-01-18

## 2023-01-19 ENCOUNTER — APPOINTMENT (OUTPATIENT)
Dept: UROLOGY | Facility: HOSPITAL | Age: 73
End: 2023-01-19

## 2023-01-19 ENCOUNTER — TRANSCRIPTION ENCOUNTER (OUTPATIENT)
Age: 73
End: 2023-01-19

## 2023-01-19 ENCOUNTER — OUTPATIENT (OUTPATIENT)
Dept: INPATIENT UNIT | Facility: HOSPITAL | Age: 73
LOS: 1 days | End: 2023-01-19
Payer: COMMERCIAL

## 2023-01-19 VITALS
WEIGHT: 201.06 LBS | HEART RATE: 86 BPM | TEMPERATURE: 97 F | DIASTOLIC BLOOD PRESSURE: 106 MMHG | OXYGEN SATURATION: 99 % | HEIGHT: 70 IN | RESPIRATION RATE: 16 BRPM | SYSTOLIC BLOOD PRESSURE: 188 MMHG

## 2023-01-19 VITALS
OXYGEN SATURATION: 100 % | SYSTOLIC BLOOD PRESSURE: 117 MMHG | TEMPERATURE: 98 F | DIASTOLIC BLOOD PRESSURE: 68 MMHG | RESPIRATION RATE: 16 BRPM

## 2023-01-19 DIAGNOSIS — Z96.0 PRESENCE OF UROGENITAL IMPLANTS: Chronic | ICD-10-CM

## 2023-01-19 DIAGNOSIS — N13.30 UNSPECIFIED HYDRONEPHROSIS: ICD-10-CM

## 2023-01-19 DIAGNOSIS — Z01.818 ENCOUNTER FOR OTHER PREPROCEDURAL EXAMINATION: ICD-10-CM

## 2023-01-19 LAB
BASE EXCESS BLDV CALC-SCNC: -1.3 MMOL/L — SIGNIFICANT CHANGE UP (ref -2–3)
CA-I SERPL-SCNC: 1.12 MMOL/L — LOW (ref 1.15–1.33)
CHLORIDE BLDV-SCNC: 106 MMOL/L — SIGNIFICANT CHANGE UP (ref 96–108)
CO2 BLDV-SCNC: 28 MMOL/L — HIGH (ref 22–26)
GAS PNL BLDV: 141 MMOL/L — SIGNIFICANT CHANGE UP (ref 136–145)
GAS PNL BLDV: SIGNIFICANT CHANGE UP
GLUCOSE BLDC GLUCOMTR-MCNC: 103 MG/DL — HIGH (ref 70–99)
GLUCOSE BLDC GLUCOMTR-MCNC: 193 MG/DL — HIGH (ref 70–99)
GLUCOSE BLDV-MCNC: 104 MG/DL — HIGH (ref 70–99)
HCO3 BLDV-SCNC: 27 MMOL/L — SIGNIFICANT CHANGE UP (ref 22–29)
HCT VFR BLDA CALC: 38 % — LOW (ref 39–51)
HGB BLD CALC-MCNC: 12.6 G/DL — SIGNIFICANT CHANGE UP (ref 12.6–17.4)
LACTATE BLDV-MCNC: 0.7 MMOL/L — SIGNIFICANT CHANGE UP (ref 0.5–2)
PCO2 BLDV: 58 MMHG — HIGH (ref 42–55)
PH BLDV: 7.27 — LOW (ref 7.32–7.43)
PO2 BLDV: 26 MMHG — SIGNIFICANT CHANGE UP (ref 25–45)
POTASSIUM BLDV-SCNC: 4.2 MMOL/L — SIGNIFICANT CHANGE UP (ref 3.5–5.1)
SAO2 % BLDV: 40.9 % — LOW (ref 67–88)

## 2023-01-19 PROCEDURE — 76000 FLUOROSCOPY <1 HR PHYS/QHP: CPT

## 2023-01-19 PROCEDURE — 84295 ASSAY OF SERUM SODIUM: CPT

## 2023-01-19 PROCEDURE — C1758: CPT

## 2023-01-19 PROCEDURE — U0003: CPT

## 2023-01-19 PROCEDURE — 52332 CYSTOSCOPY AND TREATMENT: CPT | Mod: 50

## 2023-01-19 PROCEDURE — C1769: CPT

## 2023-01-19 PROCEDURE — 87077 CULTURE AEROBIC IDENTIFY: CPT

## 2023-01-19 PROCEDURE — 83605 ASSAY OF LACTIC ACID: CPT

## 2023-01-19 PROCEDURE — C2625: CPT

## 2023-01-19 PROCEDURE — 82435 ASSAY OF BLOOD CHLORIDE: CPT

## 2023-01-19 PROCEDURE — 85014 HEMATOCRIT: CPT

## 2023-01-19 PROCEDURE — 82330 ASSAY OF CALCIUM: CPT

## 2023-01-19 PROCEDURE — 82962 GLUCOSE BLOOD TEST: CPT

## 2023-01-19 PROCEDURE — 82803 BLOOD GASES ANY COMBINATION: CPT

## 2023-01-19 PROCEDURE — 74420 UROGRAPHY RTRGR +-KUB: CPT | Mod: 26

## 2023-01-19 PROCEDURE — 84132 ASSAY OF SERUM POTASSIUM: CPT

## 2023-01-19 PROCEDURE — C9803: CPT

## 2023-01-19 PROCEDURE — 82947 ASSAY GLUCOSE BLOOD QUANT: CPT

## 2023-01-19 PROCEDURE — U0005: CPT

## 2023-01-19 PROCEDURE — 85018 HEMOGLOBIN: CPT

## 2023-01-19 PROCEDURE — 87186 SC STD MICRODIL/AGAR DIL: CPT

## 2023-01-19 PROCEDURE — 87086 URINE CULTURE/COLONY COUNT: CPT

## 2023-01-19 DEVICE — URETERAL CATH SOF-FLEX OPEN END 6FR .040" X 70CM: Type: IMPLANTABLE DEVICE | Status: FUNCTIONAL

## 2023-01-19 DEVICE — GUIDEWIRE SENSOR DUAL-FLEX NITINOL STRAIGHT .035" X 150CM: Type: IMPLANTABLE DEVICE | Status: FUNCTIONAL

## 2023-01-19 DEVICE — URETERAL CATH DUAL LUMEN 10FR 54CM: Type: IMPLANTABLE DEVICE | Status: FUNCTIONAL

## 2023-01-19 DEVICE — STENT BARD INLAY OPTIMA W/ NICORE 8 FR 26CM: Type: IMPLANTABLE DEVICE | Status: FUNCTIONAL

## 2023-01-19 RX ORDER — LIDOCAINE HCL 20 MG/ML
0.2 VIAL (ML) INJECTION ONCE
Refills: 0 | Status: DISCONTINUED | OUTPATIENT
Start: 2023-01-19 | End: 2023-01-19

## 2023-01-19 RX ORDER — SODIUM CHLORIDE 9 MG/ML
3 INJECTION INTRAMUSCULAR; INTRAVENOUS; SUBCUTANEOUS EVERY 8 HOURS
Refills: 0 | Status: DISCONTINUED | OUTPATIENT
Start: 2023-01-19 | End: 2023-01-19

## 2023-01-19 RX ORDER — HYDRALAZINE HCL 50 MG
10 TABLET ORAL ONCE
Refills: 0 | Status: COMPLETED | OUTPATIENT
Start: 2023-01-19 | End: 2023-01-19

## 2023-01-19 RX ORDER — SODIUM CHLORIDE 9 MG/ML
1000 INJECTION, SOLUTION INTRAVENOUS
Refills: 0 | Status: DISCONTINUED | OUTPATIENT
Start: 2023-01-19 | End: 2023-02-02

## 2023-01-19 RX ORDER — CEFAZOLIN SODIUM 1 G
2000 VIAL (EA) INJECTION ONCE
Refills: 0 | Status: COMPLETED | OUTPATIENT
Start: 2023-01-19 | End: 2023-01-19

## 2023-01-19 RX ORDER — HYDRALAZINE HCL 50 MG
25 TABLET ORAL ONCE
Refills: 0 | Status: COMPLETED | OUTPATIENT
Start: 2023-01-19 | End: 2023-01-19

## 2023-01-19 RX ORDER — HYDROMORPHONE HYDROCHLORIDE 2 MG/ML
0.25 INJECTION INTRAMUSCULAR; INTRAVENOUS; SUBCUTANEOUS
Refills: 0 | Status: DISCONTINUED | OUTPATIENT
Start: 2023-01-19 | End: 2023-01-19

## 2023-01-19 RX ORDER — ONDANSETRON 8 MG/1
4 TABLET, FILM COATED ORAL ONCE
Refills: 0 | Status: DISCONTINUED | OUTPATIENT
Start: 2023-01-19 | End: 2023-01-19

## 2023-01-19 RX ADMIN — SODIUM CHLORIDE 50 MILLILITER(S): 9 INJECTION, SOLUTION INTRAVENOUS at 16:14

## 2023-01-19 RX ADMIN — Medication 10 MILLIGRAM(S): at 13:34

## 2023-01-19 RX ADMIN — Medication 25 MILLIGRAM(S): at 15:42

## 2023-01-19 RX ADMIN — Medication 0.1 MILLIGRAM(S): at 15:50

## 2023-01-19 NOTE — CONSULT NOTE ADULT - SUBJECTIVE AND OBJECTIVE BOX
HPI:      PAST MEDICAL & SURGICAL HISTORY:  Hypertension      Vertigo      Lymphoma  chemo 2014      Chronic kidney disease, unspecified CKD stage      Hypothyroidism      Hyperlipidemia      BPH (benign prostatic hyperplasia)      H/O orthostatic hypotension      DM (diabetes mellitus)  on no med      Vitamin D deficiency      Blindness  right eye secondary to trauma and diabetes      H/O hydronephrosis      S/P cystoscopy with ureteral stent placement          Review of Systems:   CONSTITUTIONAL: No fever, weight loss, or fatigue  EYES: No eye pain, visual disturbances, or discharge  ENMT:  No difficulty hearing, tinnitus, vertigo; No sinus or throat pain  NECK: No pain or stiffness  BREASTS: No pain, masses, or nipple discharge  RESPIRATORY: No cough, wheezing, chills or hemoptysis; No shortness of breath  CARDIOVASCULAR: No chest pain, palpitations, dizziness, or leg swelling  GASTROINTESTINAL: No abdominal or epigastric pain. No nausea, vomiting, or hematemesis; No diarrhea or constipation. No melena or hematochezia.  GENITOURINARY: No dysuria, frequency, hematuria, or incontinence  NEUROLOGICAL: No headaches, memory loss, loss of strength, numbness, or tremors  SKIN: No itching, burning, rashes, or lesions   LYMPH NODES: No enlarged glands  ENDOCRINE: No heat or cold intolerance; No hair loss  MUSCULOSKELETAL: No joint pain or swelling; No muscle, back, or extremity pain  PSYCHIATRIC: No depression, anxiety, mood swings, or difficulty sleeping  HEME/LYMPH: No easy bruising, or bleeding gums  ALLERY AND IMMUNOLOGIC: No hives or eczema    Allergies    No Known Allergies    Intolerances        Social History:     FAMILY HISTORY:  FHx: diabetes mellitus    FHx: hypertension        MEDICATIONS  (STANDING):  cloNIDine 0.1 milliGRAM(s) Oral two times a day  hydrALAZINE Injectable 25 milliGRAM(s) IV Push once  lactated ringers. 1000 milliLiter(s) (50 mL/Hr) IV Continuous <Continuous>    MEDICATIONS  (PRN):  HYDROmorphone  Injectable 0.25 milliGRAM(s) IV Push every 10 minutes PRN Moderate Pain (4 - 6)  ondansetron Injectable 4 milliGRAM(s) IV Push once PRN Nausea and/or Vomiting        CAPILLARY BLOOD GLUCOSE      POCT Blood Glucose.: 103 mg/dL (19 Jan 2023 10:27)    I&O's Summary    19 Jan 2023 07:01  -  19 Jan 2023 15:40  --------------------------------------------------------  IN: 0 mL / OUT: 500 mL / NET: -500 mL        PHYSICAL EXAM:  GENERAL: NAD, well-developed  HEAD:  Atraumatic, Normocephalic  EYES: EOMI, PERRLA, conjunctiva and sclera clear  NECK: Supple, No JVD  CHEST/LUNG: Clear to auscultation bilaterally; No wheeze  HEART: Regular rate and rhythm; No murmurs, rubs, or gallops  ABDOMEN: Soft, Nontender, Nondistended; Bowel sounds present  EXTREMITIES:  2+ Peripheral Pulses, No clubbing, cyanosis, or edema  PSYCH: AAOx3  NEUROLOGY: non-focal  SKIN: No rashes or lesions    LABS:                    RADIOLOGY & ADDITIONAL TESTS:    Imaging Personally Reviewed:    Consultant(s) Notes Reviewed:      Care Discussed with Consultants/Other Providers:

## 2023-01-19 NOTE — ASU DISCHARGE PLAN (ADULT/PEDIATRIC) - CARE PROVIDER_API CALL
Le Desir)  Urology  [address] [place] [address]  Phone: [phone]  Fax: [phone]  Follow Up Time: Routine

## 2023-01-19 NOTE — ASU PATIENT PROFILE, ADULT - NSICDXPASTMEDICALHX_GEN_ALL_CORE_FT
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

## 2023-01-19 NOTE — ASU PREOP CHECKLIST - NOTHING BY MOUTH SINCE
18-Jan-2023 22:00 Taltz Counseling: I discussed with the patient the risks of ixekizumab including but not limited to immunosuppression, serious infections, worsening of inflammatory bowel disease and drug reactions.  The patient understands that monitoring is required including a PPD at baseline and must alert us or the primary physician if symptoms of infection or other concerning signs are noted.

## 2023-01-19 NOTE — ASU DISCHARGE PLAN (ADULT/PEDIATRIC) - ASU DC SPECIAL INSTRUCTIONSFT
STENT: You have internal stents (hollow tubes that run from the kidney to your bladder) after your procedure. Some patients experience urinary frequency, burning, or even back pain (especially with urination). These sensations will gradually get better. Increasing your fluid intake can also improve these symptoms. While the stents are in place, your urine may continue to be bloody. These stents are temporary and must be exchanged in the future.  GENERAL: It is common to have blood in your urine after your procedure. It may be pink or even red; inform your doctor if you have a significant amount of clot in the urine or if you are unable to void at all. The urine may clear and then become bloody again especially as you are more physically active.  BATHING: You may shower or bathe.  DIET: You may resume your regular diet and regular medication regimen.  PAIN: You may take Tylenol (acetaminophen) 650-975mg and/or Motrin (ibuprofen) 400-600mg, both available over the counter, for pain every 6 hours as needed. Do not exceed 4000mg of Tylenol (acetaminophen) daily. You may alternate these medications such that you take one or the other every 3 hours for around the clock pain coverage.   ANTIBIOTICS: You may be given a prescription for an antibiotic, please take this medication as instructed and be sure to complete the entire course.  STOOL SOFTENERS: Do not allow yourself to become constipated as straining may cause bleeding. Take stool softeners or a laxative (ex. Miralax, Colace, Senokot, ExLax, etc), available over the counter, if needed.  ACTIVITY: No heavy lifting or strenuous exercise until you are evaluated at your post-operative appointment. Otherwise, you may return to your usual level of physical activity.  ANTICOAGULATION: If you are taking any blood thinning medications, please discuss with your urologist prior to restarting these medications unless otherwise specified.  FOLLOW-UP: If you did not already schedule your post-operative appointment, please call your urologist to schedule a follow-up appointment.  CALL YOUR UROLOGIST IF: You have any bleeding that does not stop, inability to void >8 hours, fever over 100.4 F, chills, persistent nausea/vomiting, changes in your incision concerning for infection, or if your pain is not controlled on your discharge pain medications. CATHETER: Some patients are sent home with a sow catheter, while others go home urinating on their own. If you still have a catheter, the nurses will review instructions and care before you go home. For men, you may have a prescription for lidocaine jelly to apply to the tip of your penis, as needed, for catheter related discomfort.  STENT: You have internal stents (hollow tubes that run from the kidney to your bladder) after your procedure. Some patients experience urinary frequency, burning, or even back pain (especially with urination). These sensations will gradually get better. Increasing your fluid intake can also improve these symptoms. While the stents are in place, your urine may continue to be bloody. These stents are temporary and must be exchanged in the future.  GENERAL: It is common to have blood in your urine after your procedure. It may be pink or even red; inform your doctor if you have a significant amount of clot in the urine or if you are unable to void at all. The urine may clear and then become bloody again especially as you are more physically active.  BATHING: You may shower or bathe.  DIET: You may resume your regular diet and regular medication regimen.  PAIN: You may take Tylenol (acetaminophen) 650-975mg and/or Motrin (ibuprofen) 400-600mg, both available over the counter, for pain every 6 hours as needed. Do not exceed 4000mg of Tylenol (acetaminophen) daily. You may alternate these medications such that you take one or the other every 3 hours for around the clock pain coverage.   ANTIBIOTICS: You may be given a prescription for an antibiotic, please take this medication as instructed and be sure to complete the entire course.  STOOL SOFTENERS: Do not allow yourself to become constipated as straining may cause bleeding. Take stool softeners or a laxative (ex. Miralax, Colace, Senokot, ExLax, etc), available over the counter, if needed.  ACTIVITY: No heavy lifting or strenuous exercise until you are evaluated at your post-operative appointment. Otherwise, you may return to your usual level of physical activity.  ANTICOAGULATION: If you are taking any blood thinning medications, please discuss with your urologist prior to restarting these medications unless otherwise specified.  FOLLOW-UP: If you did not already schedule your post-operative appointment, please call your urologist to schedule a follow-up appointment.  CALL YOUR UROLOGIST IF: You have any bleeding that does not stop, inability to void >8 hours, fever over 100.4 F, chills, persistent nausea/vomiting, changes in your incision concerning for infection, or if your pain is not controlled on your discharge pain medications.

## 2023-01-19 NOTE — PRE-ANESTHESIA EVALUATION ADULT - NSANTHADDINFOFT_GEN_ALL_CORE
Patient's initial BP in 's/110's. Repeat 188/106. Patient took po Midodrine this AM. Will repeat pre induction after IV Midazolam and proceed if diastolic is < 110 for this low risk, rapid procedure that will benefit the patient's renal function. Medical clearance / evaluation Dr. OSMAN Francis read and apprecitated.

## 2023-01-19 NOTE — ASU DISCHARGE PLAN (ADULT/PEDIATRIC) - NS MD DC FALL RISK RISK
For information on Fall & Injury Prevention, visit: [email or web address] OR  [email or web address] OR  https://www.cdc.gov/steadi/patient.html

## 2023-01-19 NOTE — PROVIDER CONTACT NOTE (OTHER) - ASSESSMENT
Dr Harris to PACU to see pt- hematuria and pts overall status addressed including BP and disposition.

## 2023-01-19 NOTE — PROCEDURE NOTE - ADDITIONAL PROCEDURE DETAILS
Patient uncomfortable and unable to void despite feeling the urge to urinate.   Bladder scan performed by nurse showed >500cc of urine retained in bladder  Elevated blood pressure.  Sterile procedure performed during Jacques catheter placement.  Clear yellow urine return once catheter placed, draining well  Systolic blood pressure decreased to 170s once catheter placed  Patient tolerated procedure well

## 2023-01-19 NOTE — ASU PATIENT PROFILE, ADULT - FALL HARM RISK - PATIENT NEEDS ASSISTANCE
Mother of patient is requesting for Dr. Moss to give her a return call back regarding her daughter feeling really sick. [phone].  
Patient  put on acutes for tomorrow at 3:20 patient mother informed.   
No assistance needed

## 2023-01-19 NOTE — PROVIDER CONTACT NOTE (OTHER) - SITUATION
Jacques placed by PA at 1500-clear yellow obtained. Hematuria noted at this time - no clots noted.
/106 in SDA

## 2023-01-21 LAB
-  AMIKACIN: SIGNIFICANT CHANGE UP
-  AMOXICILLIN/CLAVULANIC ACID: SIGNIFICANT CHANGE UP
-  AMPICILLIN/SULBACTAM: SIGNIFICANT CHANGE UP
-  AMPICILLIN: SIGNIFICANT CHANGE UP
-  AMPICILLIN: SIGNIFICANT CHANGE UP
-  AZTREONAM: SIGNIFICANT CHANGE UP
-  CEFAZOLIN: SIGNIFICANT CHANGE UP
-  CEFEPIME: SIGNIFICANT CHANGE UP
-  CEFOXITIN: SIGNIFICANT CHANGE UP
-  CEFTRIAXONE: SIGNIFICANT CHANGE UP
-  CIPROFLOXACIN: SIGNIFICANT CHANGE UP
-  CIPROFLOXACIN: SIGNIFICANT CHANGE UP
-  ERTAPENEM: SIGNIFICANT CHANGE UP
-  GENTAMICIN: SIGNIFICANT CHANGE UP
-  IMIPENEM: SIGNIFICANT CHANGE UP
-  LEVOFLOXACIN: SIGNIFICANT CHANGE UP
-  LEVOFLOXACIN: SIGNIFICANT CHANGE UP
-  MEROPENEM: SIGNIFICANT CHANGE UP
-  NITROFURANTOIN: SIGNIFICANT CHANGE UP
-  NITROFURANTOIN: SIGNIFICANT CHANGE UP
-  PIPERACILLIN/TAZOBACTAM: SIGNIFICANT CHANGE UP
-  TETRACYCLINE: SIGNIFICANT CHANGE UP
-  TOBRAMYCIN: SIGNIFICANT CHANGE UP
-  TRIMETHOPRIM/SULFAMETHOXAZOLE: SIGNIFICANT CHANGE UP
-  VANCOMYCIN: SIGNIFICANT CHANGE UP
CULTURE RESULTS: SIGNIFICANT CHANGE UP
METHOD TYPE: SIGNIFICANT CHANGE UP
METHOD TYPE: SIGNIFICANT CHANGE UP
ORGANISM # SPEC MICROSCOPIC CNT: SIGNIFICANT CHANGE UP
SPECIMEN SOURCE: SIGNIFICANT CHANGE UP

## 2023-01-22 LAB
-  AMPICILLIN: SIGNIFICANT CHANGE UP
-  CIPROFLOXACIN: SIGNIFICANT CHANGE UP
-  LEVOFLOXACIN: SIGNIFICANT CHANGE UP
-  NITROFURANTOIN: SIGNIFICANT CHANGE UP
-  TETRACYCLINE: SIGNIFICANT CHANGE UP
-  VANCOMYCIN: SIGNIFICANT CHANGE UP
METHOD TYPE: SIGNIFICANT CHANGE UP

## 2023-01-23 ENCOUNTER — OUTPATIENT (OUTPATIENT)
Dept: OUTPATIENT SERVICES | Facility: HOSPITAL | Age: 73
LOS: 1 days | End: 2023-01-23
Payer: COMMERCIAL

## 2023-01-23 ENCOUNTER — APPOINTMENT (OUTPATIENT)
Dept: UROLOGY | Facility: CLINIC | Age: 73
End: 2023-01-23
Payer: MEDICARE

## 2023-01-23 VITALS
HEART RATE: 93 BPM | SYSTOLIC BLOOD PRESSURE: 155 MMHG | BODY MASS INDEX: 29.2 KG/M2 | DIASTOLIC BLOOD PRESSURE: 79 MMHG | TEMPERATURE: 97.4 F | WEIGHT: 204 LBS | HEIGHT: 70 IN

## 2023-01-23 DIAGNOSIS — R35.0 FREQUENCY OF MICTURITION: ICD-10-CM

## 2023-01-23 PROBLEM — Z87.448 PERSONAL HISTORY OF OTHER DISEASES OF URINARY SYSTEM: Chronic | Status: ACTIVE | Noted: 2022-12-29

## 2023-01-23 LAB
-  AMIKACIN: SIGNIFICANT CHANGE UP
-  AMOXICILLIN/CLAVULANIC ACID: SIGNIFICANT CHANGE UP
-  AMPICILLIN/SULBACTAM: SIGNIFICANT CHANGE UP
-  AMPICILLIN: SIGNIFICANT CHANGE UP
-  AZTREONAM: SIGNIFICANT CHANGE UP
-  CEFAZOLIN: SIGNIFICANT CHANGE UP
-  CEFEPIME: SIGNIFICANT CHANGE UP
-  CEFOXITIN: SIGNIFICANT CHANGE UP
-  CEFTRIAXONE: SIGNIFICANT CHANGE UP
-  CIPROFLOXACIN: SIGNIFICANT CHANGE UP
-  ERTAPENEM: SIGNIFICANT CHANGE UP
-  GENTAMICIN: SIGNIFICANT CHANGE UP
-  IMIPENEM: SIGNIFICANT CHANGE UP
-  LEVOFLOXACIN: SIGNIFICANT CHANGE UP
-  MEROPENEM: SIGNIFICANT CHANGE UP
-  NITROFURANTOIN: SIGNIFICANT CHANGE UP
-  PIPERACILLIN/TAZOBACTAM: SIGNIFICANT CHANGE UP
-  TOBRAMYCIN: SIGNIFICANT CHANGE UP
-  TRIMETHOPRIM/SULFAMETHOXAZOLE: SIGNIFICANT CHANGE UP
CULTURE RESULTS: SIGNIFICANT CHANGE UP
METHOD TYPE: SIGNIFICANT CHANGE UP
ORGANISM # SPEC MICROSCOPIC CNT: SIGNIFICANT CHANGE UP
SPECIMEN SOURCE: SIGNIFICANT CHANGE UP

## 2023-01-23 PROCEDURE — 51703 INSERT BLADDER CATH COMPLEX: CPT

## 2023-01-23 RX ORDER — FINASTERIDE 5 MG/1
5 TABLET, FILM COATED ORAL DAILY
Qty: 90 | Refills: 3 | Status: ACTIVE | COMMUNITY
Start: 2023-01-23 | End: 1900-01-01

## 2023-01-23 RX ORDER — CIPROFLOXACIN HYDROCHLORIDE 250 MG/1
250 TABLET, FILM COATED ORAL
Qty: 10 | Refills: 0 | Status: COMPLETED | COMMUNITY
Start: 2023-01-11 | End: 2023-01-23

## 2023-01-23 RX ORDER — CIPROFLOXACIN HYDROCHLORIDE 250 MG/1
250 TABLET, FILM COATED ORAL
Qty: 5 | Refills: 0 | Status: COMPLETED | COMMUNITY
Start: 2022-05-13 | End: 2023-01-23

## 2023-01-24 NOTE — HISTORY OF PRESENT ILLNESS
[FreeTextEntry1] : Patient unable to void\par High postvoid residuals\par New Jacques catheter inserted\par \par Plan:\par Increase Flomax to 0.8 mg daily\par Start Finasteride 5mg daily\par Attempt voiding trial again next week.

## 2023-01-25 DIAGNOSIS — N40.1 BENIGN PROSTATIC HYPERPLASIA WITH LOWER URINARY TRACT SYMPTOMS: ICD-10-CM

## 2023-01-25 DIAGNOSIS — R33.8 OTHER RETENTION OF URINE: ICD-10-CM

## 2023-01-30 ENCOUNTER — OUTPATIENT (OUTPATIENT)
Dept: OUTPATIENT SERVICES | Facility: HOSPITAL | Age: 73
LOS: 1 days | End: 2023-01-30
Payer: COMMERCIAL

## 2023-01-30 ENCOUNTER — APPOINTMENT (OUTPATIENT)
Dept: UROLOGY | Facility: CLINIC | Age: 73
End: 2023-01-30
Payer: MEDICARE

## 2023-01-30 VITALS — SYSTOLIC BLOOD PRESSURE: 130 MMHG | DIASTOLIC BLOOD PRESSURE: 82 MMHG | HEART RATE: 88 BPM

## 2023-01-30 DIAGNOSIS — Z96.0 PRESENCE OF UROGENITAL IMPLANTS: Chronic | ICD-10-CM

## 2023-01-30 DIAGNOSIS — R35.0 FREQUENCY OF MICTURITION: ICD-10-CM

## 2023-01-30 PROCEDURE — 51700 IRRIGATION OF BLADDER: CPT

## 2023-01-30 RX ORDER — TAMSULOSIN HYDROCHLORIDE 0.4 MG/1
0.4 CAPSULE ORAL
Qty: 180 | Refills: 3 | Status: ACTIVE | COMMUNITY
Start: 2023-01-30 | End: 1900-01-01

## 2023-01-30 NOTE — HISTORY OF PRESENT ILLNESS
[FreeTextEntry1] : pt here for voiding trial\par \par Pt able to void \par \par PVR 11cc\par \par return in 2 weeks for PVR check, sooner if unable to void

## 2023-01-31 DIAGNOSIS — R33.8 OTHER RETENTION OF URINE: ICD-10-CM

## 2023-02-15 ENCOUNTER — APPOINTMENT (OUTPATIENT)
Dept: UROLOGY | Facility: CLINIC | Age: 73
End: 2023-02-15
Payer: MEDICARE

## 2023-02-15 PROCEDURE — 99213 OFFICE O/P EST LOW 20 MIN: CPT

## 2023-02-15 NOTE — PHYSICAL EXAM
[General Appearance - Well Developed] : well developed [General Appearance - Well Nourished] : well nourished [Normal Appearance] : normal appearance [Abdomen Soft] : soft [Abdomen Tenderness] : non-tender [Urinary Bladder Findings] : the bladder was normal on palpation [] : no respiratory distress [No Focal Deficits] : no focal deficits [FreeTextEntry1] : walking with walker

## 2023-02-15 NOTE — HISTORY OF PRESENT ILLNESS
[FreeTextEntry1] : pt here for follow up PVR. \par Doing well with tamsulosin 0.8 mg and finasteride 5 mg\par \par Pt able to void \par \par -150cc\par \par Feels like stream is good. Denies any recent episodes of retention.

## 2023-02-15 NOTE — ASSESSMENT
[FreeTextEntry1] : continue tamsulosin 0.8 mg and finasteride 5 mg daily\par schedule for bilateral stent exchange 5/2023\par advised to call if develops retention for sooner appointment

## 2023-04-13 ENCOUNTER — OUTPATIENT (OUTPATIENT)
Dept: OUTPATIENT SERVICES | Facility: HOSPITAL | Age: 73
LOS: 1 days | End: 2023-04-13
Payer: COMMERCIAL

## 2023-04-13 VITALS
HEART RATE: 78 BPM | RESPIRATION RATE: 16 BRPM | OXYGEN SATURATION: 97 % | WEIGHT: 197.98 LBS | HEIGHT: 70 IN | SYSTOLIC BLOOD PRESSURE: 152 MMHG | TEMPERATURE: 98 F | DIASTOLIC BLOOD PRESSURE: 84 MMHG

## 2023-04-13 DIAGNOSIS — Z96.0 PRESENCE OF UROGENITAL IMPLANTS: Chronic | ICD-10-CM

## 2023-04-13 DIAGNOSIS — N13.30 UNSPECIFIED HYDRONEPHROSIS: ICD-10-CM

## 2023-04-13 LAB
A1C WITH ESTIMATED AVERAGE GLUCOSE RESULT: 6.6 % — HIGH (ref 4–5.6)
ANION GAP SERPL CALC-SCNC: 13 MMOL/L — SIGNIFICANT CHANGE UP (ref 5–17)
BUN SERPL-MCNC: 43 MG/DL — HIGH (ref 7–23)
CALCIUM SERPL-MCNC: 8.3 MG/DL — LOW (ref 8.4–10.5)
CHLORIDE SERPL-SCNC: 107 MMOL/L — SIGNIFICANT CHANGE UP (ref 96–108)
CO2 SERPL-SCNC: 23 MMOL/L — SIGNIFICANT CHANGE UP (ref 22–31)
CREAT SERPL-MCNC: 3.66 MG/DL — HIGH (ref 0.5–1.3)
EGFR: 17 ML/MIN/1.73M2 — LOW
ESTIMATED AVERAGE GLUCOSE: 143 MG/DL — HIGH (ref 68–114)
GLUCOSE SERPL-MCNC: 199 MG/DL — HIGH (ref 70–99)
HCT VFR BLD CALC: 33.6 % — LOW (ref 39–50)
HGB BLD-MCNC: 10.5 G/DL — LOW (ref 13–17)
MCHC RBC-ENTMCNC: 29.6 PG — SIGNIFICANT CHANGE UP (ref 27–34)
MCHC RBC-ENTMCNC: 31.3 GM/DL — LOW (ref 32–36)
MCV RBC AUTO: 94.6 FL — SIGNIFICANT CHANGE UP (ref 80–100)
NRBC # BLD: 0 /100 WBCS — SIGNIFICANT CHANGE UP (ref 0–0)
PLATELET # BLD AUTO: 148 K/UL — LOW (ref 150–400)
POTASSIUM SERPL-MCNC: 4.4 MMOL/L — SIGNIFICANT CHANGE UP (ref 3.5–5.3)
POTASSIUM SERPL-SCNC: 4.4 MMOL/L — SIGNIFICANT CHANGE UP (ref 3.5–5.3)
RBC # BLD: 3.55 M/UL — LOW (ref 4.2–5.8)
RBC # FLD: 13.4 % — SIGNIFICANT CHANGE UP (ref 10.3–14.5)
SODIUM SERPL-SCNC: 143 MMOL/L — SIGNIFICANT CHANGE UP (ref 135–145)
WBC # BLD: 5.76 K/UL — SIGNIFICANT CHANGE UP (ref 3.8–10.5)
WBC # FLD AUTO: 5.76 K/UL — SIGNIFICANT CHANGE UP (ref 3.8–10.5)

## 2023-04-13 NOTE — H&P PST ADULT - ASSESSMENT
Air way MP 2   upper & lower partial dentures  DASI 5 , ambulates with walker, ADL  72 year old male PMH lymphoma, S/P chemo via right chest Mediport (2014/mediport remains in place), CKD, hypothyroidism,  BPH with chronic urinary obstruction/  hydronephrosis with bilateral indwelling stents, initially placed during lymphoma treatment 16 years ago, on routine stent  exchange every 3-4 months, last stent change was in 01/19/2023.  Presents today as a routine bilateral stents change on 05/04/23.     ANES:    Activity:  walks 30 minutes 2x/day, walks indoor, home chores, take stairs                 ambulates with walker, ADL  (DASI SCORE METS):  5.8  Symptoms : denies chest pain, palpitations, dyspnea, dizziness or  HERNANDEZ,     Airway: normal  Dental: Patient denies Loose teeth/Dentures +

## 2023-04-13 NOTE — H&P PST ADULT - NSICDXPROCEDURE_GEN_ALL_CORE_FT
PROCEDURES:  Cystoscopic placement of bilateral ureteral stents 13-Apr-2023 10:51:48  Tay Arboleda

## 2023-04-13 NOTE — H&P PST ADULT - BLOOD TRANSFUSION, PREVIOUS, PROFILE
Nutrition Care Plan    Nutrition Diagnosis:   Inadequate energy intake related to decreased appetite since admission, new perforation of sigmoid colon d/t diverticulosis as evidenced by po intake 0-25% of meals, now NPO/CLD x 2 days, no full liquid diet day #3 with documented intake at 0% with poor appetite, trending wt loss of 5 kg/5% over 5 months.     Intervention:  General/healthful diet:   Advanced to Full Liquid, Consistent Carbohydrate Moderate Diet 3/15, but minimal intake.   Recommend: When medically appropriate, resume Consistent Carbohydrate Moderate Diet.   Recommend: Noted confusion/delirium improving, thus may consider d/c of standard trays.      Commercial beverage:   RD adjusting oral nutrition supplement to glucose intolerant oral nutrition supplement twice daily between meals to maximize energy intake while having minimal intake. Each provides 220 calories, 10 g protein. Unclear if pt accepting ONS, pt ROBBIN.     Medications:   Noted BM 3/17, prior last BM 3/13. No emesis documented. Prescribed senokot twice daily, miralax once daily, dulcolax suppository given today.     Monitoring and Evaluation:  Amount of food:   Goal: Consume >50% of meals, oral nutrition supplements. Consuming 0% of most full liquid meals x 2-3 days per flow sheet.      no

## 2023-04-13 NOTE — H&P PST ADULT - HISTORY OF PRESENT ILLNESS
73 y/o male PMH lymphoma, S/P chemo via right chest mediport (2014) mediport remains in place, CKD , hypothyroidism, hydronephrosis- presents today as a routine bilateral stents change every 3-4 months, last stent exchange on 9/29/22. Scheduled for cystoscopy, bilateral ureteral stent exchange on 01/19/2023    ** denies any fever, chills, s/s UTI, recent travel or covid infection  **Of note pt has h/o bilateral ureteral stents placement 15 years ago.  The patient was sent to the [facility] ER in [place] from his urologist's office with severe left hydronephrosis and RUTH,  the right stent was exchanged without difficulty, the left stent is retained and encrusted,.  The patient became hypoxic after sedation for procedure requiring intubation; however, recovered quickly and was not thought to be due to a primary pulmonary issue.  The patient had orthostatic hypotension while admitted to [facility] in April 2022, had cardiology consult with Dr. Goodson, was  on midodrine.  6/2022 s/p removal of retained left ureteral stent and change bilateral ureteral stents and removal of left nephrostomy ,  72 year old male PMH lymphoma, S/P chemo via right chest Mediport (2014/mediport remains in place), CKD, right eye blindness complication of glaucoma,, vertigo-s/p syncopal episode in 03/11,  hypothyroidism,  BPH with chronic urinary obstruction/  hydronephrosis with bilateral indwelling stents, initially placed during lymphoma treatment 16 years ago, on routine stent  exchange every 3-4 months, last stent change was in 01/19/2023.  Presents today as a routine bilateral stents change on 05/04/23.     ** Denies h/o covid infection, denies any fever, chills, s/s UTI, recent travel or covid infection.  **Of note while collecting history , pt states he is on Eliquis& metoprolol for a recent syncopal episode & was hospitalized in [facility] for 7 days, where they found a clot in the X-ray not in the lungs probably in heart, name of the cardiologist is not sure, seeing him in last week of may".       *** Pt has h/o bilateral ureteral stents placement 16 years ago.  The patient was sent to the [facility] ER in [place] from his urologist's office with severe left hydronephrosis and RUTH,  In 04/2022 During stent insertion  patient became hypoxic after sedation for procedure requiring intubation; however, recovered quickly and was not thought to be due to a primary pulmonary issue.  The patient had orthostatic hypotension while admitted to [facility] in April 2022, had cardiology consult with Dr. Goodson, was  on midodrine that time.

## 2023-04-13 NOTE — H&P PST ADULT - RESPIRATORY AND THORAX COMMENTS
s/p" hospitalized for 7 days in 03/2026 due to blood clot in chest X-ray, not PE", denies h/o  any lung disease in the past.

## 2023-04-13 NOTE — H&P PST ADULT - PROBLEM SELECTOR PLAN 1
Cystoscopy, bilateral ureteral stent exchange  Labs- CBC, BMP, HA1C, urine C&S  Pre op instructions discussed  Stop aspirin 7 days prior to OR as per Surgeon Bilateral ureteral stent exchange  Labs- CBC, BMP, HA1C, urine C&S sent  Pre op instructions discussed  Stop Eliquis 3 days preop last dose 4/30. Pt states "Surgeon advised to stop Eliquis 3 days preop"   case discussed with MIKE Garcia about resent changes in health status in 03/2023 on Eliquis & Metoprolol due to blot clot seen in chest X ray and patient not having preop cardiac eval  "> need  preop medical eval & inform the surgeon> email sent to surgeon.

## 2023-04-13 NOTE — H&P PST ADULT - MUSCULOSKELETAL
Alert-The patient is alert, awake and responds to voice. The patient is oriented to time, place, and person. The triage nurse is able to obtain subjective information. details… ROM intact/no joint swelling/no joint erythema/no joint warmth/no calf tenderness/normal gait/strength 5/5 bilateral upper extremities/strength 5/5 bilateral lower extremities

## 2023-04-13 NOTE — H&P PST ADULT - NSICDXPASTMEDICALHX_GEN_ALL_CORE_FT
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 diabetes    BPH (benign prostatic hyperplasia)     Chronic kidney disease, unspecified CKD stage     DM (diabetes mellitus) on no med    H/O hydronephrosis     H/O orthostatic hypotension     Hyperlipidemia     Hypertension     Hypothyroidism     Lymphoma chemo 2014    Vertigo     Vitamin D deficiency      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 diabetes    BPH (benign prostatic hyperplasia)     Chronic kidney disease, unspecified CKD stage     Deep vein thrombosis (DVT)     DM (diabetes mellitus) on no med    H/O hydronephrosis     H/O orthostatic hypotension     Hyperlipidemia     Hypertension     Hypothyroidism     Lymphoma chemo 2014    Syncope     Vertigo     Vitamin D deficiency

## 2023-05-03 ENCOUNTER — TRANSCRIPTION ENCOUNTER (OUTPATIENT)
Age: 73
End: 2023-05-03

## 2023-05-04 ENCOUNTER — OUTPATIENT (OUTPATIENT)
Dept: OUTPATIENT SERVICES | Facility: HOSPITAL | Age: 73
LOS: 1 days | End: 2023-05-04
Payer: COMMERCIAL

## 2023-05-04 ENCOUNTER — APPOINTMENT (OUTPATIENT)
Dept: UROLOGY | Facility: HOSPITAL | Age: 73
End: 2023-05-04

## 2023-05-04 ENCOUNTER — TRANSCRIPTION ENCOUNTER (OUTPATIENT)
Age: 73
End: 2023-05-04

## 2023-05-04 VITALS
HEART RATE: 78 BPM | OXYGEN SATURATION: 97 % | DIASTOLIC BLOOD PRESSURE: 84 MMHG | SYSTOLIC BLOOD PRESSURE: 152 MMHG | RESPIRATION RATE: 16 BRPM | HEIGHT: 70 IN | TEMPERATURE: 98 F | WEIGHT: 197.98 LBS

## 2023-05-04 VITALS
DIASTOLIC BLOOD PRESSURE: 66 MMHG | OXYGEN SATURATION: 97 % | TEMPERATURE: 97 F | RESPIRATION RATE: 17 BRPM | SYSTOLIC BLOOD PRESSURE: 137 MMHG | HEART RATE: 67 BPM

## 2023-05-04 DIAGNOSIS — N13.30 UNSPECIFIED HYDRONEPHROSIS: ICD-10-CM

## 2023-05-04 DIAGNOSIS — Z96.0 PRESENCE OF UROGENITAL IMPLANTS: Chronic | ICD-10-CM

## 2023-05-04 LAB
ANION GAP SERPL CALC-SCNC: 15 MMOL/L — SIGNIFICANT CHANGE UP (ref 5–17)
CHLORIDE SERPL-SCNC: 106 MMOL/L — SIGNIFICANT CHANGE UP (ref 96–108)
CO2 SERPL-SCNC: 24 MMOL/L — SIGNIFICANT CHANGE UP (ref 22–31)
GLUCOSE BLDC GLUCOMTR-MCNC: 117 MG/DL — HIGH (ref 70–99)
GLUCOSE BLDC GLUCOMTR-MCNC: 118 MG/DL — HIGH (ref 70–99)
POTASSIUM SERPL-MCNC: 4 MMOL/L — SIGNIFICANT CHANGE UP (ref 3.5–5.3)
POTASSIUM SERPL-SCNC: 4 MMOL/L — SIGNIFICANT CHANGE UP (ref 3.5–5.3)
SODIUM SERPL-SCNC: 145 MMOL/L — SIGNIFICANT CHANGE UP (ref 135–145)

## 2023-05-04 PROCEDURE — C2625: CPT

## 2023-05-04 PROCEDURE — 76000 FLUOROSCOPY <1 HR PHYS/QHP: CPT

## 2023-05-04 PROCEDURE — 74420 UROGRAPHY RTRGR +-KUB: CPT | Mod: 26

## 2023-05-04 PROCEDURE — C1758: CPT

## 2023-05-04 PROCEDURE — 87077 CULTURE AEROBIC IDENTIFY: CPT

## 2023-05-04 PROCEDURE — G0463: CPT

## 2023-05-04 PROCEDURE — 80048 BASIC METABOLIC PNL TOTAL CA: CPT

## 2023-05-04 PROCEDURE — 80051 ELECTROLYTE PANEL: CPT

## 2023-05-04 PROCEDURE — 87186 SC STD MICRODIL/AGAR DIL: CPT

## 2023-05-04 PROCEDURE — 87086 URINE CULTURE/COLONY COUNT: CPT

## 2023-05-04 PROCEDURE — 52332 CYSTOSCOPY AND TREATMENT: CPT | Mod: 50

## 2023-05-04 PROCEDURE — C9399: CPT

## 2023-05-04 PROCEDURE — C1769: CPT

## 2023-05-04 PROCEDURE — 83036 HEMOGLOBIN GLYCOSYLATED A1C: CPT

## 2023-05-04 PROCEDURE — 85027 COMPLETE CBC AUTOMATED: CPT

## 2023-05-04 PROCEDURE — 82962 GLUCOSE BLOOD TEST: CPT

## 2023-05-04 DEVICE — URETERAL CATH SOF-FLEX OPEN END 6FR .040" X 70CM: Type: IMPLANTABLE DEVICE | Site: BILATERAL | Status: FUNCTIONAL

## 2023-05-04 DEVICE — URETERAL STENT INLAY OPTIMA 8FR 26CM: Type: IMPLANTABLE DEVICE | Site: BILATERAL | Status: FUNCTIONAL

## 2023-05-04 DEVICE — GUIDEWIRE SENSOR DUAL-FLEX NITINOL STRAIGHT .035" X 150CM: Type: IMPLANTABLE DEVICE | Site: BILATERAL | Status: FUNCTIONAL

## 2023-05-04 RX ORDER — ACETAMINOPHEN 500 MG
1000 TABLET ORAL ONCE
Refills: 0 | Status: DISCONTINUED | OUTPATIENT
Start: 2023-05-04 | End: 2023-05-04

## 2023-05-04 RX ORDER — HYDROMORPHONE HYDROCHLORIDE 2 MG/ML
0.5 INJECTION INTRAMUSCULAR; INTRAVENOUS; SUBCUTANEOUS
Refills: 0 | Status: DISCONTINUED | OUTPATIENT
Start: 2023-05-04 | End: 2023-05-04

## 2023-05-04 RX ORDER — ONDANSETRON 8 MG/1
4 TABLET, FILM COATED ORAL ONCE
Refills: 0 | Status: DISCONTINUED | OUTPATIENT
Start: 2023-05-04 | End: 2023-05-04

## 2023-05-04 RX ORDER — CEFAZOLIN SODIUM 1 G
2000 VIAL (EA) INJECTION ONCE
Refills: 0 | Status: COMPLETED | OUTPATIENT
Start: 2023-05-04 | End: 2023-05-04

## 2023-05-04 RX ORDER — SODIUM CHLORIDE 9 MG/ML
3 INJECTION INTRAMUSCULAR; INTRAVENOUS; SUBCUTANEOUS EVERY 8 HOURS
Refills: 0 | Status: DISCONTINUED | OUTPATIENT
Start: 2023-05-04 | End: 2023-05-04

## 2023-05-04 RX ORDER — LIDOCAINE HCL 20 MG/ML
0.2 VIAL (ML) INJECTION ONCE
Refills: 0 | Status: DISCONTINUED | OUTPATIENT
Start: 2023-05-04 | End: 2023-05-04

## 2023-05-04 RX ORDER — HYDROMORPHONE HYDROCHLORIDE 2 MG/ML
0.25 INJECTION INTRAMUSCULAR; INTRAVENOUS; SUBCUTANEOUS
Refills: 0 | Status: DISCONTINUED | OUTPATIENT
Start: 2023-05-04 | End: 2023-05-04

## 2023-05-04 NOTE — PRE-ANESTHESIA EVALUATION ADULT - NSANTHADDINFOFT_GEN_ALL_CORE
Patient held ASA and Eliquis for 7 days. Will resume both medications once he is home this evening. Discussed with Dr. Desir and Dr. Harris.

## 2023-05-04 NOTE — ASU PREOP CHECKLIST - ANTIBIOTIC
[facility] Internal Medicine Discharge Summary   Patient ID:  Santiago Lomas  [id number]  80 year old  1940    Admit date: 9/14/2020    Discharge date and time: 9/16/2020     Attending Physician: Samira Burks MD     Primary Care Physician: Shamir Johnson MD     Admit Dx: Chest pain [R07.9]  Chest pain, unspecified type [R07.9]  Chest pain [R07.9]      Primary Diagnosis at discharge from Hospital:   Angina - CAD    Secondary Diagnoses:     Left eye ptosis/left arm paresthesias     Uncontrolled hypertension     Hyponatremia     CKD stage 3    Chronic diastolic heart failure     BPH      Risk of readmission: low    Discharged Condition: good    Disposition: Home    Important follow up:  -PCP in 1-2 weeks  Ravindra Multani PA-C  [address] [place] [address]  [phone]    Schedule an appointment as soon as possible for a visit in 2 weeks  See Dr Weaver's MIGUEL Waite for cardiology follow up.     -Labs: none   -Radiology: none       Discharge meds     Summary of your Discharge Medications      Take these Medications      Details   amLODIPine 5 MG tablet  Commonly known as: NORVASC   Take 1 tablet by mouth daily.  Comment: Do not start before July 13, 2020.     aspirin 81 MG EC tablet  Commonly known as: ECOTRIN   Take 81 mg by mouth daily.     clopidogrel 75 MG tablet  Commonly known as: PLAVIX   Take 75 mg by mouth daily.     ezetimibe 10 MG tablet  Commonly known as: ZETIA   Take 10 mg by mouth daily.     hydrALAZINE 25 MG tablet  Commonly known as: APRESOLINE   Take 25 mg by mouth daily as needed. If sbp > 140     isosorbide mononitrate 30 MG 24 hr tablet  Commonly known as: IMDUR  Start taking on: September 17, 2020   Take 1 tablet by mouth daily. Do not start before September 17, 2020.     Nexletol 180 MG Tab   Generic drug: Bempedoic Acid  Take 1 tablet by mouth daily.     nitroGLYcerin 0.6 MG sublingual tablet  Commonly known as: NITROSTAT   Place 0.6 mg under the tongue every 5 minutes as  needed for Chest pain.     pantoprazole 40 MG tablet  Commonly known as: PROTONIX   Take 40 mg by mouth daily.     rosuvastatin 10 MG tablet  Commonly known as: CRESTOR  Start taking on: September 17, 2020   Take 1 tablet by mouth 2 days a week.     Toprol XL 25 MG 24 hr tablet   Generic drug: metoPROLOL succinate  Take 25 mg by mouth daily.     Welchol 625 MG tablet   Generic drug: colesevelam  Take 1,875 mg by mouth 2 times daily (with meals).            Consults:   IP Consult Orders (From admission, onward)     Start     Ordered    09/14/20 1111  Inpatient consult to Physician  ONE TIME     Provider:  Ceferino Blas MD    09/14/20 1122    09/14/20 0955  Inpatient consult to Cardiology  ONE TIME     Provider:  Antione Jessica MD    09/14/20 0954    09/14/20 0822  Inpatient consult to Cardiology  ONE TIME     Provider:  Antione Jessica MD    09/14/20 0821                 Radiology: Mra Head Wo Contrast    Result Date: 9/14/2020  Narrative: MRA OF THE BRAIN DATED 09/14/2020: CLINICAL INDICATION: Left-sided numbness.  Ataxia. COMPARISON: 02/04/2019 TECHNIQUE: 3-D time-of-flight MRA of the brain was performed.  Maximum intensity projection images of the anterior and posterior circulation were created.  Please note that MRA has decreased sensitivity for the detection of small aneurysms. FINDINGS:  Anterior circulation: There is significant atherosclerotic plaque involving the cavernous and supraclinoid portions of the right ICA, with moderate narrowing.  No occlusion is identified.  There is mild narrowing involving the cavernous and supraclinoid  portions of the left ICA secondary to atherosclerotic plaque.  No occlusion.  The right A1 segment is small or not present.  Flow to the right NOEL is provided from left-sided collateral circulation.  Otherwise, anterior cerebral arteries appear unremarkable without evidence of focal flow-limiting stenosis or occlusion. Middle cerebral arteries also appear within normal  limits. Posterior circulation: Intracranial portions of the vertebral arteries, basilar, and posterior cerebral arteries demonstrate no evidence of focal flow-limiting stenosis or occlusion.  Right P1 segment is small or not present.  Most of the flow to the right posterior cerebral arteries provided by prominent right posterior commuting artery. No aneurysm is identified. The stenosis estimation is based on comparison of residual internal carotid lumen at the level of stenosis compared to the distal internal carotid lumen.     Impression: 1.  Moderate narrowing of the cavernous and supraclinoid right ICA secondary to atherosclerotic plaque. 2.  No evidence of focal flow-limiting stenosis or occlusion involving the major intracranial arteries. 3.  Variant anatomy as above. Electronically Signed by: OTILIA KAM M.D. Signed on: 9/14/2020 2:42 PM     Mra Neck W Wo Contrast    Result Date: 9/14/2020  Narrative: MRA OF THE NECK VESSELS DATED 09/14/2020: CLINICAL INDICATION: Left-sided weakness and numbness. COMPARISON: There are no prior examinations currently available for comparison. TECHNIQUE: Axial noncontrast 3-D time-of-flight MRA of the neck vessels was performed.  Axial T1 fat-sat images were obtained. This was followed by coronal contrast enhanced MRA. Maximum intensity projection images of the carotid and vertebral arteries were created.  Please note that MRA has decreased sensitivity for the detection of small aneurysms. FINDINGS: On the T1 fat-saturated sequence, no hyperintense signal is identified within the walls of the carotid or vertebral arteries to suggest hemosiderin deposition from dissection.   Right carotid: Brachiocephalic artery appears within normal limits.  Origin, course, and caliber of the right common carotid artery appears within normal limits without evidence of focal flow-limiting stenosis or occlusion.  No significant narrowing is identified at the right bifurcation or proximal ICA.   Remainder of the cervical right ICA appears within normal limits.   Left carotid: Origin is within normal limits.  Course and caliber of the left common carotid artery appears within normal limits.  No significant plaque identified at the right left bifurcation or proximal ICA.  Remainder of the cervical left ICA appears within normal limits.   Vertebrals: Origin, course, and caliber of both vertebral arteries appears within normal limits without evidence of focal flow-limiting stenosis, occlusion, or dissection.  No aneurysm is identified. The stenosis estimation is based on comparison of residual internal carotid lumen at the level of stenosis compared to the distal internal carotid lumen.     Impression: 1.  No evidence of focal flow-limiting stenosis, occlusion, or dissection involving the cervical carotid or vertebral arteries. Electronically Signed by: OTILIA KAM M.D. Signed on: 9/14/2020 2:38 PM     Mri Brain W Wo Contrast    Result Date: 9/14/2020  Narrative: MRI OF THE BRAIN WITHOUT AND WITH CONTRAST DATED 09/14/2020 CLINICAL INDICATION: Left-sided numbness.  Ataxia.  Ptosis.  Evaluate for brainstem stroke. COMPARISON: 02/04/2019 TECHNIQUE: Sagittal T1, coronal T1, and axial T2, FLAIR, DWI, and T2-GRE images were obtained. Post-infusion coronal T1 and axial 3-D SPGR images were also obtained. FINDINGS:  There is mild generalized parenchymal volume loss.  On the diffusion-weighted images, there is no evidence of an acute/recent infarct.   There are few scattered foci of T2/FLAIR hyperintense signal in the subcortical and periventricular white matter, nonspecific, but likely representing mild chronic microvascular ischemic changes.  There are bilateral cerebellar lacunar infarcts.  No sulcal effacement or confluent cerebral edema is identified.   There are scattered punctate foci of susceptibly artifact in the cerebellar hemispheres which appears stable.  Other punctate scattered foci susceptibly artifact  are identified.  These are nonspecific but may represent mild chronic microhemorrhages.  These are stable.  Flow voids are maintained in the major intracranial vessels.  Midline structures appear within normal limits.  After the administration of intravenous contrast, no enhancing mass lesion is identified.  There is no abnormal dural or leptomeningeal enhancement.  Visualized dural venous sinuses are well opacified without filling defects to suggest dural venous sinus  thrombosis.     Impression: 1.  No evidence of acute infarct. 2.  Mild chronic microvascular ischemic changes.  No sulcal effacement or confluent cerebral edema. Electronically Signed by: OTILIA KAM M.D. Signed on: 9/14/2020 2:42 PM     Xr Chest Pa Or Ap 1 View    Result Date: 9/14/2020  Narrative: EXAM: XR CHEST PA OR AP 1 VIEW CLINICAL INDICATION: Possible covid19 exposure. Chest pain. COMPARISON: Chest 12/18/2019, CT of the chest 07/10/2020 FINDINGS: AP portable upright chest. Sequelae of coronary bypass surgery and coronary stenting are noted.  The heart is nonenlarged.  There is no pulmonary edema.  No focal lung consolidation or mass.  No pleural effusion.  There is no pneumothorax.  Severe left glenohumeral osteoarthritis.   There are calcified intra-articular bodies within the left glenohumeral joint.  Mild convex left curvature of the thoracic spine.  Degenerative facet changes in the cervical spine.     Impression: 1.  No acute findings with chronic changes as above. Electronically Signed by: QUINTIN VALLE M.D. Signed on: 9/14/2020 8:28 AM     Cath/pv Case    Result Date: 9/15/2020  Narrative: · 1st Mrg lesion with 90% stenosis. · Ost LM lesion with 50% stenosis. · Mid LM lesion with 50% stenosis. · Prox LAD lesion with 80% stenosis. · Mid LAD lesion with 99% stenosis. · Prox RCA to Mid RCA lesion with 90% stenosis.  Cardiac cath was performed.  Cardiac cath shows diffuse coronary arterial calcification.  Left main was engaged with a  4 left diagnostic catheter.  No dampening of pressures noted.  There is an ostial left main stenosis of 50% in the mid left main stenosis of 50%.  Circumflex at the takeoff of left main had a 50% lesion.  The distal circumflex had diffuse disease.  The first obtuse marginal that had previously been stented just proximal to the stent had a 90% stenosis.  There was not a stenosis there in the past.  The vein graft to the obtuse marginal superior branch was patent however flow was compromised the inferior branch.  The LAD was diffusely diseased.  The mid circumflex had a 70% stenosis.  The LAD had 80 to 90% stenosis and in the mid LAD was 99% stenosed.  Right coronary arteriogram: The right coronary artery had severe diffuse stenosis to 90 to 95%.  The vein graft was anastomosed into the right coronary artery and beyond that the right coronary was occluded.  The right coronary artery was collateralized.  Vein graft angiography: The vein graft to the right coronary artery was engaged.  This is a 70% proximal lesion.  All of the above anatomy was unchanged compared to before.  The vein graft to the obtuse marginal was patent.  This was engaged there was no dampening of pressure.  The vein graft to the diagonal was engaged and there was no dampening of pressure.  This was patent as well with a 50% anastomotic lesion that was unchanged compared to before.  LIMA to the LAD was engaged.  The LIMA to the LAD was patent with a good anastomosis into the LAD. Diffuse coronary disease described.  New obtuse marginal lesion.  Recommendation is for intervention.  Dr. hilario was called for proceeding with intervention     Cath/pv Case    Result Date: 9/15/2020  Narrative: · See separate report for details of diagnostic coronary angiography. Successful PCI with CAROLYN x2 of severe lesion from mid LCX into OM.  1) ASA/plavix without cessation for one year. 2) Post-procedural IVF hydration in setting of CKD 3) Cardiology to follow.         Operative Procedures: Procedure(s) (LRB):  CORONARY ANGIOGRAM/POSSIBLE PTCA - CV (N/A)  LEFT HEART CATH - CV (N/A)  Bypass Graft Angiogram (N/A)  PTCA with Stent (N/A)     Please refer to prior H&P by Dr. Casper on 9/14/20  Santiago Lomas  is a 80 year old male presenting with several weeks of ongoing left chest pain with exertion that improves with rest. Over the last few days, he noticed it was also coming without exertion. He has some associated left arm paresthesias. He denies nausea/vomiting, dypsnea, or diaphoresis. Yesterday throughout the day he was having intermittent symptoms and noted his BP was uncontrolled. He took hydralazine and then nitroglycerin without much improvement. His SBP was in 220s last night and he continued to have symptoms so he called EMS and came to ED.     Of note he reports some left eyelid drooping that his PCP mentioned at a recent follow up visit one week ago. He denies headache, dizziness, slurred speech, or facial droop.    Hospital Course:   Pt continued to c/o persistent CP. Pt underwent cath. PCI w/ CAROLYN x 2 of severe lesion from mid LCx into OM.  Pt d/c in stable condition.    Chest pain in setting of known CAD  --with extensive cardiac history including 5V CABG in 2014 and multiple stents since then  --recent stress test in 7/2020 showed reversible defect but was stable from previous; at that time patient apparently was declining procedures so plan made for medical management  --troponins neg  --cath -> PCI w/ CAROLYN x 2 of severe lesions from mid LCx into OM  --asa, plavix     HL  - statin, add nexletol per cards as well    Left eye ptosis/left arm paresthesias  --symptoms ongoing for several days  --neuro consult  --MRI neg for acute stroke  --sxs actually resolved post cath     Uncontrolled hypertension  --bp did improve on meds  --f/u w/ cards as outpt     Hyponatremia  --likely mild volume depletion  --Na improved w/ IVF  --Na 134 at d/c    CKD stage 3  --Cr currently  baseline per chart review  --monitor with repeat BMP in AM  --monitor UOP, avoid nephrotoxins     Chronic diastolic heart failure  --clinically compensated  --continue current cardiac medications     BPH  --stable, continue current medications    Day of discharge Exam   Vitals:    09/16/20 1124   BP: 105/55   Pulse: 60   Resp:    Temp: 98.4 °F (36.9 °C)       Exam on day of discharge:  Gen: No acute distress, alert and oriented  CV: RRR, +s1/s2  Lungs: CTAB, good respiratory effort  Abdomen: s/nt/nd  Ext: Moves all 4 extremities, no c/c/e  Neuro: no focal deficits      Total Time Coordinating Care: Greater than 30 minutes    Patient had opportunity to ask questions and state understand and agree with therapeutic plan as outlined       Negative yes

## 2023-05-04 NOTE — PRE-ANESTHESIA EVALUATION ADULT - NSANTHAIRWAYFT_ENT_ALL_CORE
Decreased ROM  Beard  Poor dentition, missing various teeth. Dentures removed  Neck circumference <17cm  TM distance >2 finger breadths  Interincisor distance >2 finger breadths

## 2023-05-04 NOTE — ASU DISCHARGE PLAN (ADULT/PEDIATRIC) - ASU DC SPECIAL INSTRUCTIONSFT
STENT: You have internal stents (hollow tubes that run from the kidney to your bladder) after your procedure, which helps urine drain from the kidney to your bladder. Some patients experience urinary frequency, burning, or even back pain (especially with urination). These sensations will gradually get better. Increasing your fluid intake can also improve these symptoms. While the stent is in place, your urine may continue to be bloody. These stents are temporary and must be exchanged by your urologist GENERAL: It is common to have blood in your urine after your procedure. It may be pink or even red; inform your doctor if you have a significant amount of clot in the urine or if you are unable to void at all. The urine may clear and then become bloody again especially as you are more physically active.  BATHING: You may shower or bathe.  DIET: You may resume your regular diet and regular medication regimen.  PAIN: You may take Tylenol (acetaminophen) 650-975mg and/or Motrin (ibuprofen) 400-600mg, both available over the counter, for pain every 6 hours as needed. Do not exceed 4000mg of Tylenol (acetaminophen) daily. You may alternate these medications such that you take one or the other every 3 hours for around the clock pain coverage.   ANTIBIOTICS: You may be given a prescription for an antibiotic, please take this medication as instructed and be sure to complete the entire course.  STOOL SOFTENERS: Do not allow yourself to become constipated as straining may cause bleeding. Take stool softeners or a laxative (ex. Miralax, Colace, Senokot, ExLax, etc), available over the counter, if needed.  ACTIVITY: No heavy lifting or strenuous exercise until you are evaluated at your post-operative appointment. Otherwise, you may return to your usual level of physical activity.  ANTICOAGULATION: Continue taking aspirin and eliquis, may resume tonight 5/4/2023.  FOLLOW-UP: If you did not already schedule your post-operative appointment, please call your urologist to schedule a follow-up appointment.  CALL YOUR UROLOGIST IF: You have any bleeding that does not stop, inability to void >8 hours, fever over 100.4 F, chills, persistent nausea/vomiting, changes in your incision concerning for infection, or if your pain is not controlled on your discharge pain medications.

## 2023-05-04 NOTE — BRIEF OPERATIVE NOTE - OPERATION/FINDINGS
Procedure: cystoscopy, bilateral ureteral stent exchange  Preop dx: bilateral hydronephrosis  Postop dx: bilateral hydronephrosis

## 2023-05-04 NOTE — ASU PATIENT PROFILE, ADULT - FALL HARM RISK - HARM RISK INTERVENTIONS

## 2023-05-04 NOTE — ASU DISCHARGE PLAN (ADULT/PEDIATRIC) - NS MD DC FALL RISK RISK
For information on Fall & Injury Prevention, visit: [email or web address] OR  [email or web address] OR  https://www.cdc.gov/steadi/patient.html

## 2023-05-04 NOTE — PRE-ANESTHESIA EVALUATION ADULT - NSRADCARDRESULTSFT_GEN_ALL_CORE
4/22/22: TTE:  Ejection Fraction (Mckeon Rule): 65 %  ------------------------------------------------------------------------  OBSERVATIONS:  Mitral Valve: Mitral annular calcification, otherwise normal mitral valve. Minimal mitral regurgitation.  Aortic Root: Normal aortic root.  Aortic Valve: Calcified trileaflet aortic valve with normal opening.  Left Atrium: Normal left atrium.  LA volume index = 29 cc/m2.  Left Ventricle: Normal left ventricular systolic function. No segmental wall motion abnormalities. Normal left ventricular internal dimensions and wall thicknesses. Mild diastolic dysfunction (Stage I).  Right Heart: Normal right atrium. Normal right ventricular size and function. Normal tricuspid valve. Minimal tricuspid regurgitation. Normal pulmonic valve.  Pericardium/Pleura: Normal pericardium with no pericardial effusion.  ------------------------------------------------------------------------  CONCLUSIONS:  1. Mitral annular calcification, otherwise normal mitral valve. Minimal mitral regurgitation.  2. Normal left ventricular internal dimensions and wall thicknesses.  3. Normal left ventricular systolic function. No segmental wall motion abnormalities.  4. Mild diastolic dysfunction (Stage I).  5. Normal right ventricular size and function.  No obvious cardiac source of embolus was identified on this transthoracic study.  If clinical suspicion is high,  consider KATELYNN for further evaluation.

## 2023-05-31 ENCOUNTER — APPOINTMENT (OUTPATIENT)
Dept: UROLOGY | Facility: CLINIC | Age: 73
End: 2023-05-31
Payer: MEDICARE

## 2023-05-31 PROCEDURE — 99443: CPT

## 2023-06-01 RX ORDER — LEVOFLOXACIN 250 MG/1
250 TABLET, FILM COATED ORAL
Qty: 7 | Refills: 0 | Status: COMPLETED | COMMUNITY
Start: 2023-04-26 | End: 2023-06-01

## 2023-06-04 NOTE — HISTORY OF PRESENT ILLNESS
[FreeTextEntry1] : Telehealth appointment scheduled for today for post op visit\par \par Patient had cystoscopy bilateral ureteral stents change on May 4, 2023\par He does not have any difficulties with voiding postoperatively\par He continues to do well\par He complains that sodium bicarbonate appears to be causing him constipation so he discontinued it\par \par I advised him to make his nephrologist aware since sodium bicarbonate has been prescribed and managed by his nephrologist\par \par He will continue finasteride and Flomax \par \par Next stent change in 3 months\par \par

## 2023-06-15 PROBLEM — I82.409 ACUTE EMBOLISM AND THROMBOSIS OF UNSPECIFIED DEEP VEINS OF UNSPECIFIED LOWER EXTREMITY: Chronic | Status: ACTIVE | Noted: 2023-04-13

## 2023-06-15 PROBLEM — R55 SYNCOPE AND COLLAPSE: Chronic | Status: ACTIVE | Noted: 2023-04-13

## 2023-06-19 NOTE — BRIEF OPERATIVE NOTE - NSICDXBRIEFOPLAUNCH_GEN_ALL_CORE
<--- Click to Launch ICDx for PreOp, PostOp and Procedure Carac Counseling:  I discussed with the patient the risks of Carac including but not limited to erythema, scaling, itching, weeping, crusting, and pain.

## 2023-09-08 NOTE — PRE-OP CHECKLIST - VERIFY SURGICAL SITE/SIDE WITH PATIENT
September 8, 2023      [facility]  [address] [address]  Phone: [phone]  Fax: [phone]       Patient: Yesenia Sanches   YOB: 1980  Date of Visit: 09/08/2023    To Whom It May Concern:    DAVI Sanches  was at [facility] on 09/08/2023. The patient may return to work/school on 9/8/2023 with no restrictions. If you have any questions or concerns, or if I can be of further assistance, please do not hesitate to contact me.    Sincerely,    Feroz Dietz NP     
done
done

## 2023-09-25 ENCOUNTER — OUTPATIENT (OUTPATIENT)
Dept: OUTPATIENT SERVICES | Facility: HOSPITAL | Age: 73
LOS: 1 days | End: 2023-09-25
Payer: COMMERCIAL

## 2023-09-25 VITALS
SYSTOLIC BLOOD PRESSURE: 127 MMHG | HEIGHT: 70 IN | DIASTOLIC BLOOD PRESSURE: 63 MMHG | OXYGEN SATURATION: 99 % | WEIGHT: 194.01 LBS | RESPIRATION RATE: 17 BRPM | HEART RATE: 71 BPM | TEMPERATURE: 97 F

## 2023-09-25 DIAGNOSIS — Z01.818 ENCOUNTER FOR OTHER PREPROCEDURAL EXAMINATION: ICD-10-CM

## 2023-09-25 DIAGNOSIS — N20.0 CALCULUS OF KIDNEY: ICD-10-CM

## 2023-09-25 DIAGNOSIS — N13.30 UNSPECIFIED HYDRONEPHROSIS: ICD-10-CM

## 2023-09-25 DIAGNOSIS — N18.9 CHRONIC KIDNEY DISEASE, UNSPECIFIED: ICD-10-CM

## 2023-09-25 DIAGNOSIS — Z96.0 PRESENCE OF UROGENITAL IMPLANTS: Chronic | ICD-10-CM

## 2023-09-25 DIAGNOSIS — I10 ESSENTIAL (PRIMARY) HYPERTENSION: ICD-10-CM

## 2023-09-25 LAB
A1C WITH ESTIMATED AVERAGE GLUCOSE RESULT: 6.3 % — HIGH (ref 4–5.6)
ANION GAP SERPL CALC-SCNC: 13 MMOL/L — SIGNIFICANT CHANGE UP (ref 5–17)
BUN SERPL-MCNC: 42 MG/DL — HIGH (ref 7–23)
CALCIUM SERPL-MCNC: 8.4 MG/DL — SIGNIFICANT CHANGE UP (ref 8.4–10.5)
CHLORIDE SERPL-SCNC: 108 MMOL/L — SIGNIFICANT CHANGE UP (ref 96–108)
CO2 SERPL-SCNC: 22 MMOL/L — SIGNIFICANT CHANGE UP (ref 22–31)
CREAT SERPL-MCNC: 3.9 MG/DL — HIGH (ref 0.5–1.3)
EGFR: 16 ML/MIN/1.73M2 — LOW
ESTIMATED AVERAGE GLUCOSE: 134 MG/DL — HIGH (ref 68–114)
GLUCOSE SERPL-MCNC: 168 MG/DL — HIGH (ref 70–99)
HCT VFR BLD CALC: 37.2 % — LOW (ref 39–50)
HGB BLD-MCNC: 11.9 G/DL — LOW (ref 13–17)
MCHC RBC-ENTMCNC: 29.5 PG — SIGNIFICANT CHANGE UP (ref 27–34)
MCHC RBC-ENTMCNC: 32 GM/DL — SIGNIFICANT CHANGE UP (ref 32–36)
MCV RBC AUTO: 92.1 FL — SIGNIFICANT CHANGE UP (ref 80–100)
NRBC # BLD: 0 /100 WBCS — SIGNIFICANT CHANGE UP (ref 0–0)
PLATELET # BLD AUTO: 180 K/UL — SIGNIFICANT CHANGE UP (ref 150–400)
POTASSIUM SERPL-MCNC: 4.5 MMOL/L — SIGNIFICANT CHANGE UP (ref 3.5–5.3)
POTASSIUM SERPL-SCNC: 4.5 MMOL/L — SIGNIFICANT CHANGE UP (ref 3.5–5.3)
RBC # BLD: 4.04 M/UL — LOW (ref 4.2–5.8)
RBC # FLD: 14.5 % — SIGNIFICANT CHANGE UP (ref 10.3–14.5)
SODIUM SERPL-SCNC: 143 MMOL/L — SIGNIFICANT CHANGE UP (ref 135–145)
WBC # BLD: 5.51 K/UL — SIGNIFICANT CHANGE UP (ref 3.8–10.5)
WBC # FLD AUTO: 5.51 K/UL — SIGNIFICANT CHANGE UP (ref 3.8–10.5)

## 2023-09-25 PROCEDURE — 85027 COMPLETE CBC AUTOMATED: CPT

## 2023-09-25 PROCEDURE — G0463: CPT

## 2023-09-25 PROCEDURE — 83036 HEMOGLOBIN GLYCOSYLATED A1C: CPT

## 2023-09-25 PROCEDURE — 87086 URINE CULTURE/COLONY COUNT: CPT

## 2023-09-25 PROCEDURE — 80048 BASIC METABOLIC PNL TOTAL CA: CPT

## 2023-09-25 RX ORDER — DOCUSATE SODIUM 100 MG
1 CAPSULE ORAL
Qty: 0 | Refills: 0 | DISCHARGE

## 2023-09-25 RX ORDER — SODIUM CHLORIDE 9 MG/ML
1000 INJECTION, SOLUTION INTRAVENOUS
Refills: 0 | Status: DISCONTINUED | OUTPATIENT
Start: 2023-10-06 | End: 2023-10-20

## 2023-09-25 RX ORDER — SODIUM CHLORIDE 9 MG/ML
3 INJECTION INTRAMUSCULAR; INTRAVENOUS; SUBCUTANEOUS EVERY 8 HOURS
Refills: 0 | Status: DISCONTINUED | OUTPATIENT
Start: 2023-10-06 | End: 2023-10-20

## 2023-09-25 NOTE — H&P PST ADULT - HISTORY OF PRESENT ILLNESS
This is a 73 year old male with past medical history of HTN, HLD, DMT2, CKD and lymphoma treated with chemotherapy, h/o prior cytoscopues and stent placments. Today presenting to [facility] for schedule Cystoscopy, bilateral ureteral stent change on 10/6/23 with Dr. Desir.     **Of note, review of documentation in April 2022- urology, imaging showed left sided hydronephrosis and was sent to the ED to have nephrostomy tube placement with  IR. The patient had the placement of the L nephrostomy tube under light sedation with fentanyl and propofol and the case was uneventful. At the conclusion, the patients was noted to desaturated to as low as 50s/60s and was quickly moved into supine position and intubated. His O2 sats immediately improve. He was monitored in the IR suite and successfully extubated. Per anesthesia, it was believed that the patient had a delayed response to the anesthetics."  Subsequent procedure have been tolerated and uneventful.    This is a 73 year old male with past medical history of HTN, HLD, DMT2 (no longer on medications), CKD and lymphoma treated with chemotherapy years ago, h/o prior cytoscopes and stent placements. Today presenting to [facility] for schedule Cystoscopy, bilateral ureteral stent change on 10/6/23 with Dr. Desir.     **Of note, review of documentation in April 2022- urology, imaging showed left sided hydronephrosis and was sent to the ED to have nephrostomy tube placement with  IR. The patient had the placement of the L nephrostomy tube under light sedation with fentanyl and propofol and the case was uneventful. At the conclusion, the patients was noted to desaturated to as low as 50s/60s and was quickly moved into supine position and intubated. His O2 sats immediately improve. He was monitored in the IR suite and successfully extubated. Per anesthesia, it was believed that the patient had a delayed response to the anesthetics."  Subsequent procedure have been tolerated and uneventful.    This is a 73 year old male with past medical history of HTN, HLD, DMT2 (no longer on medications), CKD and lymphoma treated with chemotherapy years ago, h/o prior cytoscopes and stent placements. Today presenting to [facility] for schedule Cystoscopy, bilateral ureteral stent change on 10/6/23 with Dr. Desir.     **Of note, review of documentation in April 2022- urology, imaging showed left sided hydronephrosis and was sent to the ED to have nephrostomy tube placement with  IR. The patient had the placement of the L nephrostomy tube under light sedation with fentanyl and propofol and the case was uneventful. At the conclusion, the patients was noted to desaturated to as low as 50s/60s and was quickly moved into supine position and intubated. His O2 sats immediately improve. He was monitored in the IR suite and successfully extubated. Per anesthesia, it was believed that the patient had a delayed response to the anesthetics."  Subsequent procedures have been tolerated and uneventful. Last Cystoscopy.  Bilateral retrograde pyelograms.  Bilateral ureteral stents exchange performed on 5/4/23 in which Eliquis was held preoperatively.    This is a 73 year old male with past medical history of HTN, HLD, DMT2 (no longer on medications), CKD and lymphoma treated with chemotherapy years ago, h/o prior cytoscopes and stent placements. Today presenting to [facility] for schedule Cystoscopy, bilateral ureteral stent change on 10/6/23 with Dr. Desir.     **Of note, review of documentation in April 2022- urology, imaging showed left sided hydronephrosis and was sent to the ED to have nephrostomy tube placement with  IR. The patient had the placement of the L nephrostomy tube under light sedation with fentanyl and propofol and the case was uneventful. At the conclusion, the patients was noted to desaturated to as low as 50s/60s and was quickly moved into supine position and intubated. His O2 sats immediately improve. He was monitored in the IR suite and successfully extubated. Per anesthesia, it was believed that the patient had a delayed response to the anesthetics."  Subsequent procedures have been tolerated and uneventful. Last Cystoscopy.  Bilateral retrograde pyelograms.  Bilateral ureteral stents exchange performed on 5/4/23 in which Eliquis was held preoperatively for ? occlusion and stenosis left vertebral artery .

## 2023-09-25 NOTE — H&P PST ADULT - PROBLEM SELECTOR PLAN 1
Preop instructions given  Labs CBC BMP A1c UC performed in PST  Last dose of Eliquis to Preop instructions given to pt and daughter  Labs CBC BMP A1c performed in PST  PT unable to provided urine specimen while in PST, will return   Last dose of Eliquis on 10/2/23  Medical eval on 9/23

## 2023-09-25 NOTE — H&P PST ADULT - NSICDXPASTMEDICALHX_GEN_ALL_CORE_FT
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

## 2023-09-25 NOTE — H&P PST ADULT - ASSESSMENT
Stop Eliquis 3 days preop last dose 4/30. Pt states "Surgeon advised to stop Eliquis 3 days preop"   case discussed with MIKE Garcia about resent changes in health status in 03/2023 on Eliquis & Metoprolol due to blot clot seen in chest X ray and patient not having preop cardiac eval  "> need  preop medical eval & inform the surgeon> email sent to surgeon. DASI Score:  DASI Activity: able to go up one flight of stairs or walk 1-2 blocks with out difficulty  Loose or removable teeth: denies        top Eliquis 3 days preop last dose 4/30. Pt states "Surgeon advised to stop Eliquis 3 days preop"   case discussed with MIKE Garcia about resent changes in health status in 03/2023 on Eliquis & Metoprolol due to blot clot seen in chest X ray and patient not having preop cardiac eval  "> need  preop medical eval & inform the surgeon> email sent to surgeon. DASI Score: 4.5  DASI Activity: Uses rollator during ambulation  Loose or removable teeth: partial dentures top and bottom

## 2023-09-26 LAB
CULTURE RESULTS: NO GROWTH — SIGNIFICANT CHANGE UP
SPECIMEN SOURCE: SIGNIFICANT CHANGE UP

## 2023-10-05 ENCOUNTER — TRANSCRIPTION ENCOUNTER (OUTPATIENT)
Age: 73
End: 2023-10-05

## 2023-10-06 ENCOUNTER — APPOINTMENT (OUTPATIENT)
Dept: UROLOGY | Facility: HOSPITAL | Age: 73
End: 2023-10-06

## 2023-10-06 ENCOUNTER — OUTPATIENT (OUTPATIENT)
Dept: OUTPATIENT SERVICES | Facility: HOSPITAL | Age: 73
LOS: 1 days | End: 2023-10-06
Payer: COMMERCIAL

## 2023-10-06 ENCOUNTER — TRANSCRIPTION ENCOUNTER (OUTPATIENT)
Age: 73
End: 2023-10-06

## 2023-10-06 VITALS
HEIGHT: 70 IN | TEMPERATURE: 98 F | SYSTOLIC BLOOD PRESSURE: 155 MMHG | RESPIRATION RATE: 16 BRPM | HEART RATE: 63 BPM | OXYGEN SATURATION: 100 % | DIASTOLIC BLOOD PRESSURE: 74 MMHG | WEIGHT: 194.01 LBS

## 2023-10-06 VITALS
OXYGEN SATURATION: 98 % | SYSTOLIC BLOOD PRESSURE: 122 MMHG | DIASTOLIC BLOOD PRESSURE: 65 MMHG | RESPIRATION RATE: 15 BRPM | HEART RATE: 65 BPM

## 2023-10-06 DIAGNOSIS — Z96.0 PRESENCE OF UROGENITAL IMPLANTS: Chronic | ICD-10-CM

## 2023-10-06 DIAGNOSIS — N18.9 CHRONIC KIDNEY DISEASE, UNSPECIFIED: ICD-10-CM

## 2023-10-06 DIAGNOSIS — N13.30 UNSPECIFIED HYDRONEPHROSIS: ICD-10-CM

## 2023-10-06 PROCEDURE — C1758: CPT

## 2023-10-06 PROCEDURE — 74420 UROGRAPHY RTRGR +-KUB: CPT | Mod: 26

## 2023-10-06 PROCEDURE — 76000 FLUOROSCOPY <1 HR PHYS/QHP: CPT

## 2023-10-06 PROCEDURE — 74420 UROGRAPHY RTRGR +-KUB: CPT | Mod: 26,AS

## 2023-10-06 PROCEDURE — 52332 CYSTOSCOPY AND TREATMENT: CPT | Mod: 50

## 2023-10-06 PROCEDURE — C2625: CPT

## 2023-10-06 PROCEDURE — C1769: CPT

## 2023-10-06 DEVICE — GUIDEWIRE SENSOR DUAL-FLEX NITINOL STRAIGHT .035" X 150CM: Type: IMPLANTABLE DEVICE | Status: FUNCTIONAL

## 2023-10-06 DEVICE — URETERAL STENT INLAY OPTIMA 8FR 26CM: Type: IMPLANTABLE DEVICE | Status: FUNCTIONAL

## 2023-10-06 DEVICE — URETERAL CATH OPEN END 5FR 70CM: Type: IMPLANTABLE DEVICE | Status: FUNCTIONAL

## 2023-10-06 RX ORDER — FENTANYL CITRATE 50 UG/ML
25 INJECTION INTRAVENOUS
Refills: 0 | Status: DISCONTINUED | OUTPATIENT
Start: 2023-10-06 | End: 2023-10-06

## 2023-10-06 RX ORDER — LIDOCAINE HCL 20 MG/ML
0.2 VIAL (ML) INJECTION ONCE
Refills: 0 | Status: COMPLETED | OUTPATIENT
Start: 2023-10-06 | End: 2023-10-06

## 2023-10-06 RX ORDER — ONDANSETRON 8 MG/1
4 TABLET, FILM COATED ORAL ONCE
Refills: 0 | Status: DISCONTINUED | OUTPATIENT
Start: 2023-10-06 | End: 2023-10-20

## 2023-10-06 RX ORDER — CEFAZOLIN SODIUM 1 G
2000 VIAL (EA) INJECTION ONCE
Refills: 0 | Status: COMPLETED | OUTPATIENT
Start: 2023-10-06 | End: 2023-10-06

## 2023-10-06 RX ADMIN — SODIUM CHLORIDE 3 MILLILITER(S): 9 INJECTION INTRAMUSCULAR; INTRAVENOUS; SUBCUTANEOUS at 07:43

## 2023-10-06 RX ADMIN — SODIUM CHLORIDE 100 MILLILITER(S): 9 INJECTION, SOLUTION INTRAVENOUS at 07:43

## 2023-10-06 NOTE — ASU PATIENT PROFILE, ADULT - FALL HARM RISK - HARM RISK INTERVENTIONS

## 2023-10-06 NOTE — ASU DISCHARGE PLAN (ADULT/PEDIATRIC) - NS MD DC FALL RISK RISK
For information on Fall & Injury Prevention, visit: [email or web address] OR  [email or web address] OR  https://www.cdc.gov/steadi/patient.html

## 2023-11-01 ENCOUNTER — APPOINTMENT (OUTPATIENT)
Dept: UROLOGY | Facility: CLINIC | Age: 73
End: 2023-11-01
Payer: MEDICARE

## 2023-11-01 PROCEDURE — 99442: CPT

## 2023-11-05 NOTE — H&P PST ADULT - PSYCHIATRIC
negative Affect and characteristics of appearance, verbalizations, behaviors are appropriate Discharged

## 2023-12-06 NOTE — BRIEF OPERATIVE NOTE - SPECIMENS
[facility]  CARDIOLOGY  PROGRESS  NOTE:       Dee Sierra Patient Status:  Inpatient    3/7/1929 MRN [id number]   Location [address] TELEMETRY Attending Ambika Owusu MD   Hosp Day # 4 PCP Lamin Marinelli, DO     SUBJECTIVE:      CC:  Cardiology Consulted for:  HF.    Family @ bedside.  Sitting up in bed.  Poor p.o. intake.  BM today.  'Will try to eat dinner.'  No chest pain or dizziness.  Shortness of breath slowly improving.  On 2L.   Orthopnea improving.  No PND.    Ambulates at home with walker.  Lives alone.       Patient dry, decreased urination today.  Bladder scan 40 mL.    Patient stated 'she has not drank fluids today or yesterday due to her constipation.'  (+) BM today.  Gentle hydration per Attending.  Breathing treatments per Attending.  Nursing to ACE compression wrap BLE to knees (chronic venous insufficiency).   Keep BLE elevated.    ROS:  GEN:  No fever.  No chills.  Fatigue.  HENT:  No sore throat.  No nosebleed.  (+) Nasal congestion.    CV:  No chest pain.  No palpitations.  Peripheral edema improving.    RESP:  No shortness of breath with rest.  Productive cough, white sputum.  No Wheezing.  GI:  No nausea.  No vomiting.  No Abd pain.  No rectal bleeding.  :  No dysuria.  No hematuria.  NEURO:  Alert.  No headache.  Remainder of 12 point systems reviewed and negative (or as mentioned in HPI)    OBJECTIVE:   MEDICATIONS:  Current Facility-Administered Medications   Medication Dose Route Frequency Provider Last Rate Last Admin    carvedilol (COREG) tablet 12.5 mg  12.5 mg Oral BID  Liliam Nova MD   12.5 mg at 23 0916    spironolactone (ALDACTONE) tablet 12.5 mg  12.5 mg Oral Daily Liliam Nova MD   12.5 mg at 23 0916    furosemide (LASIX INJECT) injection 40 mg  40 mg Intravenous BID Joy Gallardo MD   40 mg at 23 0920    aspirin (ECOTRIN) enteric coated tablet 81 mg  81 mg Oral Daily Joy Gallardo MD   81 mg at 23 0916     sodium chloride 0.9 % injection 2 mL  2 mL Intracatheter 2 times per day Joy Gallardo MD   2 mL at 12/06/23 0917    Potassium Standard Replacement Protocol (Levels 3.5 and lower)   Does not apply See Admin Instructions Joy Gallardo MD        Magnesium Standard Replacement Protocol   Does not apply See Admin Instructions Joy Gallardo MD        Phosphorus Standard Replacement Protocol   Does not apply See Admin Instructions Joy Gallardo MD        [Held by provider] apixaBAN (ELIQUIS) tablet 5 mg  5 mg Oral 2 times per day Joy Gallardo MD        cinacalcet (SENSIPAR) tablet 30 mg  30 mg Oral Daily Joy Gallardo MD   30 mg at 12/06/23 0916    dorzolamide (TRUSOPT) 2 % ophthalmic solution 1 drop  1 drop Both Eyes BID Joy Gallardo MD   1 drop at 12/06/23 0922    lisinopril (ZESTRIL) tablet 5 mg  5 mg Oral Daily Joy Gallardo MD   5 mg at 12/06/23 0916    loratadine (CLARITIN) tablet 10 mg  10 mg Oral Daily Joy Gallardo MD   10 mg at 12/06/23 0944    magnesium oxide (MAG-OX) tablet 400 mg  400 mg Oral Daily Joy Gallardo MD   400 mg at 12/06/23 0916    timolol (TIMOPTIC) 0.5 % ophthalmic solution 1 drop  1 drop Both Eyes BID Joy Gallardo MD   1 drop at 12/06/23 0922        ALLERGIES:  ALLERGIES:  No Known Allergies   PHYSICAL  EXAM:  Vital Last Value 24 Hour Range   Temperature 97.2 °F (36.2 °C) (12/06/23 1450) Temp  Min: 96.8 °F (36 °C)  Max: 97.7 °F (36.5 °C)   Pulse 62 (12/06/23 1450) Pulse  Min: 61  Max: 65   Respiratory 18 (12/06/23 1450) Resp  Min: 16  Max: 18   Non-Invasive  Blood Pressure 106/63 (12/06/23 1450) BP  Min: 94/59  Max: 150/71   Pulse Oximetry 95 % (12/06/23 1450) SpO2  Min: 90 %  Max: 99 %   Arterial   Blood Pressure   No data recorded     TELE:  I personally reviewed Telemetry:  SR @ 68.  IVCD.  1st Degree AVB.  No ectopy, runs or pauses seen over night.  HR 60's.    INTAKE/ OUTPUT:    Intake/Output Summary (Last 24 hours) at 12/6/2023 1127  Last  data filed at 12/6/2023 0600  Gross per 24 hour   Intake --   Output 600 ml   Net -600 ml       Weight    12/02/23 2258 12/04/23 0354 12/05/23 0300 12/06/23 0359   Weight: 103.2 kg (227 lb 8.2 oz) 103 kg (227 lb 1.2 oz) 102.8 kg (226 lb 10.1 oz) 102.8 kg (226 lb 10.1 oz)      GEN:  Alert.  Afebrile.  Appears comfortable.    EYE:  Normal conjunctiva.    HENT:  Oral mucosa is moist.    NECK:  Supple.  Non-tender.  No JVD visualized.    RESP:  Non-labored.  Scattered expiratory wheezes bilaterally.    CV:  Normal rate.  Regular rhythm.  S1  S2.  Systolic murmur.  1-2+ BLE pitting edema up to knees, softening (On Adm edema pitting, tight to upper thighs).  Chronic BLE edema, close to baseline.  GI:  Soft.  No distention.  (+) Bowel sounds.    :  No costovertebral angle tenderness.    Musculoskeletal:   Overall weakness.    SKIN:  Warm.  Dry.    NEURO:  Alert.  Follows commands.  Oriented.    Cognition and Speech:  Speech clear, and coherent.    PSYCH:  Cooperative.     Clinical Data:   (PERSONALLY REVIEWED)    LABS:    CBC  Recent Labs   Lab 12/06/23  1045 12/05/23  0441 12/04/23  0421   WBC 4.9 4.3 4.9   HCT 36.1 36.8 35.2*   HGB 10.9* 11.5* 11.0*   PLT 79* 87* 91*       CMP  Recent Labs   Lab 12/06/23  1045 12/05/23  0441 12/04/23  0421 12/02/23  1752   SODIUM 132* 132* 133* 125*   POTASSIUM 4.5 4.3 4.3 4.2   CHLORIDE 95* 93* 93* 88*   CO2 37* 38* 35* 33*   GLUCOSE 144* 109* 99 117*   BUN 32* 25* 26* 24*   CREATININE 1.02* 0.82 0.94 0.79   CALCIUM 7.9* 8.1* 7.9* 8.1*   TOTPROTEIN  --   --   --  6.6   ALBUMIN  --   --   --  2.6*   BILIRUBIN  --   --   --  1.2*   AST  --   --   --  53*   GPT  --   --   --  48   ALKPT  --   --   --  80       Cardiac Labs  Recent Labs   Lab 12/02/23  2338 12/02/23  1752   TSH 1.071  --    NTPROB  --  2,972*         Lipid Panel  Recent Labs   Lab 12/03/23  0421   CHOLESTEROL 152   HDL 51   CALCLDL 94   TRIGLYCERIDE 33         Coags  Recent Labs   Lab 12/03/23  1038 12/02/23  1752   INR  1.1 1.3   PTT 26  --          ABG  No results found  IMAGING:    ECG:   Encounter Date: 12/02/23   Electrocardiogram 12-Lead   Result Value    Ventricular Rate EKG/Min (BPM) 71    Atrial Rate (BPM) 71    NC-Interval (MSEC) 210    QRS-Interval (MSEC) 126    QT-Interval (MSEC) 446    QTc 485    P Axis (Degrees) 71    R Axis (Degrees) -76    T Axis (Degrees) 85    REPORT TEXT      Sinus rhythm  with 1st degree AV block  Left axis deviation  Nonspecific intraventricular block  Minimal voltage criteria for LVH, may be normal variant  (  Avalon product  )  Cannot rule out  Anterior infarct  , age undetermined  Abnormal ECG  No previous ECGs available  Confirmed by BREE ORNELAS MD ([id number]) on 12/3/2023 2:00:33 PM     12/03/2023  TTE:  SUMMARY:     1. Left ventricle: The cavity size is normal. Wall thickness is moderately     increased. There is concentric hypertrophy. Systolic function is normal.     EF55% Doppler parameters are consistent with abnormal left ventricular     relaxation (grade 1 diastolic dysfunction).  2. Left atrium: The atrium is moderately dilated.  3. Right ventricle: The cavity size is at the upper limits of normal.  4. Right atrium: The atrium is mildly to moderately dilated.  5. Tricuspid valve: There is moderate-severe regurgitation.  6. Small posterior MAC.  7. PA pressure at least 46 mmHg.     LANRE MPI:  No results found for this or any previous visit.    CARDIAC  CATH:   No results found for this or any previous visit.  ASSESSMENT and PLAN:      Acute on Chronic HFpEF/ EF 55%/ pro BNP 2972  -CHF clinic consult.  -CHF NYHA - Class III.  -TSH 1.071  -Hx  04/04/2023  TTE -->  EF 50%.  No WMA.  Diastolic dysfunction grade 2.  Mild MR/TR.  -12/03/2023  TTE -->  LVH.  EF 55%.  No WMA.  Diastolic dysfunction grade 1.  Mild MR.  Mod-Severe TR.  RVSP 55 mmHg.    -Continue Coreg, Lisinopril, Aldactone, and Lasix.  -Consider Jardiance prior to discharge.     Chronic BLE Edema/ Chronic Venous  Kathy  -Consider Compression therapy as outpatient.    1st Degree AVB  IVCD    1/2023  RLL Pul Embolus  -Heme consult.  -Resume Eliquis when OK with Heme.    HTN Heart Disease   -Continue current medicine regimen.     Hyponatremia (Adm Na+ 125) - monitor  Hypomagnesemia (Adm Mg++ 1.5) - monitor     Thrombocytopenia (Adm Plts 35K) - monitor   -HemeOnc consult.   -Resume Eliquis when OK with Heme.    Mixed HLD - on statin  -LDL 94.  HDL 51.  .  TRIG 33.  -LFTs nl.   Goal  LDL < 70 mg/dL.    Valvular Dz/ Mild MR/ Mod-Severe TR  -Clinical management.    Obesity/ Body mass index is 36.58 kg/m².   -I personally discussed weight reduction, dietary restrictions, and increase activity with patient ~3 min.  Patient understood.    -Sleep study as outpatient.     PLAN:  Heme consult.  On Coreg, Lisinopril, and Aldactone.  Switch IV Lasix to Lasix 20 mg bid.  Start Jardiance in the next day or two.     Continue bASA, and statin.  Resume Eliquis when OK with Heme.  Monitor Plts (79K today).  PT/OT recommend Rehab (Patient lives alone).    Breathing Treatments per Attending.   Patient dry, gentle hydration per Attending.  Nursing to ACE compression wrap BLE up to knees.  Elevate BLE with pillows while in bed.    Consider Compression therapy as outpatient.  Follow-up with primary cardiologist Luis Alberto Colon, information in discharge section.    D/w Nurse Jeri, and Dr Owusu.      Thank you for allowing us to participate in this patient's care.  Please do not hesitate to call with any questions or concerns.    Alivia Birch,  ANP-BC  ACNP-BC  Nurse Practitioner  [facility].  12/6/2023 4:27 PM    Portions of this note may have been created using the Dragon voice recognition system.  Errors in content may be related to improper recognition of the system.  Effort to review and correct the note has been made but irregularities may still be present.    none

## 2024-02-01 ENCOUNTER — OUTPATIENT (OUTPATIENT)
Dept: OUTPATIENT SERVICES | Facility: HOSPITAL | Age: 74
LOS: 1 days | End: 2024-02-01
Payer: COMMERCIAL

## 2024-02-01 VITALS
OXYGEN SATURATION: 100 % | WEIGHT: 203.05 LBS | HEIGHT: 70 IN | SYSTOLIC BLOOD PRESSURE: 157 MMHG | RESPIRATION RATE: 18 BRPM | DIASTOLIC BLOOD PRESSURE: 81 MMHG | HEART RATE: 65 BPM | TEMPERATURE: 98 F

## 2024-02-01 DIAGNOSIS — Z96.0 PRESENCE OF UROGENITAL IMPLANTS: Chronic | ICD-10-CM

## 2024-02-01 DIAGNOSIS — Z01.818 ENCOUNTER FOR OTHER PREPROCEDURAL EXAMINATION: ICD-10-CM

## 2024-02-01 DIAGNOSIS — N13.30 UNSPECIFIED HYDRONEPHROSIS: ICD-10-CM

## 2024-02-01 LAB
A1C WITH ESTIMATED AVERAGE GLUCOSE RESULT: 6.3 % — HIGH (ref 4–5.6)
ANION GAP SERPL CALC-SCNC: 11 MMOL/L — SIGNIFICANT CHANGE UP (ref 5–17)
BUN SERPL-MCNC: 45 MG/DL — HIGH (ref 7–23)
CALCIUM SERPL-MCNC: 8.2 MG/DL — LOW (ref 8.4–10.5)
CHLORIDE SERPL-SCNC: 108 MMOL/L — SIGNIFICANT CHANGE UP (ref 96–108)
CO2 SERPL-SCNC: 26 MMOL/L — SIGNIFICANT CHANGE UP (ref 22–31)
CREAT SERPL-MCNC: 3.58 MG/DL — HIGH (ref 0.5–1.3)
EGFR: 17 ML/MIN/1.73M2 — LOW
ESTIMATED AVERAGE GLUCOSE: 134 MG/DL — HIGH (ref 68–114)
GLUCOSE SERPL-MCNC: 112 MG/DL — HIGH (ref 70–99)
HCT VFR BLD CALC: 35.5 % — LOW (ref 39–50)
HGB BLD-MCNC: 11.2 G/DL — LOW (ref 13–17)
MCHC RBC-ENTMCNC: 30.1 PG — SIGNIFICANT CHANGE UP (ref 27–34)
MCHC RBC-ENTMCNC: 31.5 GM/DL — LOW (ref 32–36)
MCV RBC AUTO: 95.4 FL — SIGNIFICANT CHANGE UP (ref 80–100)
NRBC # BLD: 0 /100 WBCS — SIGNIFICANT CHANGE UP (ref 0–0)
PLATELET # BLD AUTO: 170 K/UL — SIGNIFICANT CHANGE UP (ref 150–400)
POTASSIUM SERPL-MCNC: 4.4 MMOL/L — SIGNIFICANT CHANGE UP (ref 3.5–5.3)
POTASSIUM SERPL-SCNC: 4.4 MMOL/L — SIGNIFICANT CHANGE UP (ref 3.5–5.3)
RBC # BLD: 3.72 M/UL — LOW (ref 4.2–5.8)
RBC # FLD: 14.4 % — SIGNIFICANT CHANGE UP (ref 10.3–14.5)
SODIUM SERPL-SCNC: 145 MMOL/L — SIGNIFICANT CHANGE UP (ref 135–145)
WBC # BLD: 4.79 K/UL — SIGNIFICANT CHANGE UP (ref 3.8–10.5)
WBC # FLD AUTO: 4.79 K/UL — SIGNIFICANT CHANGE UP (ref 3.8–10.5)

## 2024-02-01 RX ORDER — LEVOTHYROXINE SODIUM 125 MCG
1 TABLET ORAL
Qty: 0 | Refills: 0 | DISCHARGE

## 2024-02-01 RX ORDER — MECLIZINE HCL 12.5 MG
1 TABLET ORAL
Qty: 0 | Refills: 0 | DISCHARGE

## 2024-02-01 RX ORDER — APIXABAN 2.5 MG/1
1 TABLET, FILM COATED ORAL
Refills: 0 | DISCHARGE

## 2024-02-01 RX ORDER — METOPROLOL TARTRATE 50 MG
1 TABLET ORAL
Refills: 0 | DISCHARGE

## 2024-02-01 NOTE — H&P PST ADULT - FEMALE-SPECIFIC SYMPTOMS
HISTORY OF PRESENT ILLNESS  Mr. Oro is a pleasant 20 year old year old male here for Covid clearance exam  No symptoms  Feels well  Completed isolation and cardiac testing      MEDICAL HISTORY  There is no problem list on file for this patient.      No current outpatient medications on file.       Allergies   Allergen Reactions     Penicillin G      Sulfa Drugs        No family history on file.  Social History     Socioeconomic History     Marital status: Single     Spouse name: Not on file     Number of children: Not on file     Years of education: Not on file     Highest education level: Not on file   Occupational History     Not on file   Tobacco Use     Smoking status: Not on file     Smokeless tobacco: Not on file   Substance and Sexual Activity     Alcohol use: Not on file     Drug use: Not on file     Sexual activity: Not on file   Other Topics Concern     Not on file   Social History Narrative     Not on file     Social Determinants of Health     Financial Resource Strain: Not on file   Food Insecurity: Not on file   Transportation Needs: Not on file   Physical Activity: Not on file   Stress: Not on file   Social Connections: Not on file   Intimate Partner Violence: Not on file   Housing Stability: Not on file       Additional medical/Social/Surgical histories reviewed in [facility] and updated as appropriate.     REVIEW OF SYSTEMS (1/16/2022)  10 point ROS of systems including Constitutional, Eyes, Respiratory, Cardiovascular, Gastroenterology, Genitourinary, Integumentary, Musculoskeletal, Psychiatric, Allergic/Immunologic were all negative except for pertinent positives noted in my HPI.     PHYSICAL EXAM  Exam:  Constitutional: healthy, alert and no distress  Head: Normocephalic. No masses, lesions, tenderness or abnormalities  Neck: Neck supple. No adenopathy. Thyroid symmetric, normal size,, Carotids without bruits.  ENT: ENT exam normal, no neck nodes or sinus tenderness  Cardiovascular: negative, PMI  normal. No lifts, heaves, or thrills. RRR. No murmurs, clicks gallops or rub  Respiratory: negative, Percussion normal. Good diaphragmatic excursion. Lungs clear  Gastrointestinal: Abdomen soft, non-tender. BS normal. No masses, organomegaly    Skin: no suspicious lesions or rashes  Neurologic: Gait normal. Reflexes normal and symmetric. Sensation grossly WNL.  Psychiatric: mentation appears normal and affect normal/bright  Hematologic/Lymphatic/Immunologic: Normal cervical lymph nodes  ASSESSMENT & PLAN  19 yo male s/p covid infection, improved, clearance exam    I independently reviewed the following imaging studies:  troponing and EKG are WNL  No concerns  Can RTP per ATC discussed with athlete  Cleared for participation  Appropriate PPE was utilized for prevention of spread of Covid-19.  Prashant Swain MD, CAM     not applicable

## 2024-02-01 NOTE — H&P PST ADULT - SKIN
"[id number]  Orientation/Cognitive: A&Ox4  Observation Goals (Met/ Not Met): inpatient status   Mobility Level/Assist Equipment: strict bedrest, not OOB this shift, left foot has splint WBAT in CAM boot, NWB for right LE  Fall Risk (Y/N): yes  Behavior Concerns: none  Pain Management: PRN Oxycodone. Tylenol and ice   Tele/VS/O2: VSS on RA  ABNL Lab/BG: creat 0.97, glucose 136  Diet: regular   Bowel/Bladder: continent-using bedpan  Skin Concerns: bruising on left ankle  Drains/Devices: IVF @100mL/hr  Tests/Procedures for next shift: ORIF 5/25/23  Anticipated DC date & active delays: TBD  Patient Stated Goal for Today: \"pain control\"    " warm and dry/color normal/normal/no rashes

## 2024-02-01 NOTE — H&P PST ADULT - PROBLEM SELECTOR PLAN 2
continue on antihypertensive medications    eliquis to be held 3 days prior per doctor - last dose 2/11  pt instructed to continue asa 81 mg

## 2024-02-01 NOTE — H&P PST ADULT - PROBLEM SELECTOR PLAN 1
instructions given to patient and verbalizes understanding   Labs CBC BMP UC A1C  day of: FS     medical clearance pending

## 2024-02-01 NOTE — H&P PST ADULT - ADDITIONAL PE
DASI score: 6.64  DASI activity: walks daily denies any cardiac symptoms   Loose teeth or denture: upper and lower dentures

## 2024-02-01 NOTE — H&P PST ADULT - HISTORY OF PRESENT ILLNESS
Patient comes to [facility] for cystoscopy bilateral ureteral stents change with MD Desir on 2/15/24. Patient has a pmhx of HTN, HLD, DM ( no medication), CKD, lymphoma hasn't had chemo since 1980's.  Patient reports having cystoscopy with bilateral ureteral stents placed 04/2022, and has had about 7-8 stent exchanges since. Reports last exchange was 10/2023. patient denies any hematuria, denies any urinary symptoms. Denies any pain or discomfort. Patient denies any other complaints at this time.       **Of note, review of documentation in April 2022- urology, imaging showed left sided hydronephrosis and was sent to the ED to have nephrostomy tube placement with  IR. The patient had the placement of the L nephrostomy tube under light sedation with fentanyl and propofol and the case was uneventful. At the conclusion, the patients was noted to desaturated to as low as 50s/60s and was quickly moved into supine position and intubated. His O2 sats immediately improve. He was monitored in the IR suite and successfully extubated. Per anesthesia, it was believed that the patient had a delayed response to the anesthetics."  Subsequent procedures have been tolerated and uneventful.    Patient comes to [facility] for cystoscopy bilateral ureteral stents change with MD Desir on 2/15/24. Patient has a pmhx of HTN, HLD, DM ( no medication), CKD, lymphoma hasn't had chemo since 1980's.  Patient reports having cystoscopy with bilateral ureteral stents placed 04/2022, and has had about 7-8 stent exchanges since. Reports last exchange was 10/2023. Patient denies any hematuria, denies any urinary symptoms. Denies any pain or discomfort. Patient denies any other complaints at this time.       **Of note, review of documentation in April 2022- urology, imaging showed left sided hydronephrosis and was sent to the ED to have nephrostomy tube placement with  IR. The patient had the placement of the L nephrostomy tube under light sedation with fentanyl and propofol and the case was uneventful. At the conclusion, the patients was noted to desaturated to as low as 50s/60s and was quickly moved into supine position and intubated. His O2 sats immediately improve. He was monitored in the IR suite and successfully extubated. Per anesthesia, it was believed that the patient had a delayed response to the anesthetics."  Subsequent procedures have been tolerated and uneventful.

## 2024-02-03 LAB
CULTURE RESULTS: SIGNIFICANT CHANGE UP
SPECIMEN SOURCE: SIGNIFICANT CHANGE UP

## 2024-02-06 RX ORDER — LEVOFLOXACIN 250 MG/1
250 TABLET, FILM COATED ORAL DAILY
Qty: 5 | Refills: 0 | Status: ACTIVE | COMMUNITY
Start: 2024-02-06 | End: 1900-01-01

## 2024-02-14 ENCOUNTER — TRANSCRIPTION ENCOUNTER (OUTPATIENT)
Age: 74
End: 2024-02-14

## 2024-02-15 ENCOUNTER — OUTPATIENT (OUTPATIENT)
Dept: INPATIENT UNIT | Facility: HOSPITAL | Age: 74
LOS: 1 days | End: 2024-02-15
Payer: COMMERCIAL

## 2024-02-15 ENCOUNTER — APPOINTMENT (OUTPATIENT)
Dept: UROLOGY | Facility: HOSPITAL | Age: 74
End: 2024-02-15

## 2024-02-15 ENCOUNTER — TRANSCRIPTION ENCOUNTER (OUTPATIENT)
Age: 74
End: 2024-02-15

## 2024-02-15 VITALS
SYSTOLIC BLOOD PRESSURE: 144 MMHG | TEMPERATURE: 97 F | OXYGEN SATURATION: 99 % | DIASTOLIC BLOOD PRESSURE: 69 MMHG | HEART RATE: 69 BPM

## 2024-02-15 VITALS
DIASTOLIC BLOOD PRESSURE: 58 MMHG | RESPIRATION RATE: 18 BRPM | WEIGHT: 203.05 LBS | TEMPERATURE: 98 F | HEART RATE: 75 BPM | SYSTOLIC BLOOD PRESSURE: 102 MMHG | HEIGHT: 70 IN | OXYGEN SATURATION: 99 %

## 2024-02-15 DIAGNOSIS — Z96.0 PRESENCE OF UROGENITAL IMPLANTS: Chronic | ICD-10-CM

## 2024-02-15 DIAGNOSIS — N13.30 UNSPECIFIED HYDRONEPHROSIS: ICD-10-CM

## 2024-02-15 LAB
GLUCOSE BLDC GLUCOMTR-MCNC: 101 MG/DL — HIGH (ref 70–99)
GLUCOSE BLDC GLUCOMTR-MCNC: 103 MG/DL — HIGH (ref 70–99)

## 2024-02-15 PROCEDURE — C2625: CPT

## 2024-02-15 PROCEDURE — 82962 GLUCOSE BLOOD TEST: CPT

## 2024-02-15 PROCEDURE — G0463: CPT

## 2024-02-15 PROCEDURE — 76000 FLUOROSCOPY <1 HR PHYS/QHP: CPT

## 2024-02-15 PROCEDURE — 52332 CYSTOSCOPY AND TREATMENT: CPT | Mod: 50

## 2024-02-15 PROCEDURE — 83036 HEMOGLOBIN GLYCOSYLATED A1C: CPT

## 2024-02-15 PROCEDURE — 85027 COMPLETE CBC AUTOMATED: CPT

## 2024-02-15 PROCEDURE — 74420 UROGRAPHY RTRGR +-KUB: CPT | Mod: 26

## 2024-02-15 PROCEDURE — C1758: CPT

## 2024-02-15 PROCEDURE — 80048 BASIC METABOLIC PNL TOTAL CA: CPT

## 2024-02-15 PROCEDURE — 87086 URINE CULTURE/COLONY COUNT: CPT

## 2024-02-15 PROCEDURE — C1769: CPT

## 2024-02-15 DEVICE — URETERAL CATH SOF-FLEX OPEN END 6FR .040" X 70CM: Type: IMPLANTABLE DEVICE | Status: FUNCTIONAL

## 2024-02-15 DEVICE — GUIDEWIRE SENSOR DUAL-FLEX NITINOL STRAIGHT .035" X 150CM: Type: IMPLANTABLE DEVICE | Status: FUNCTIONAL

## 2024-02-15 DEVICE — URETERAL STENT INLAY OPTIMA 8FR 26CM: Type: IMPLANTABLE DEVICE | Status: FUNCTIONAL

## 2024-02-15 RX ORDER — METOPROLOL TARTRATE 50 MG
1 TABLET ORAL
Refills: 0 | DISCHARGE

## 2024-02-15 RX ORDER — CEFAZOLIN SODIUM 1 G
2000 VIAL (EA) INJECTION ONCE
Refills: 0 | Status: COMPLETED | OUTPATIENT
Start: 2024-02-15 | End: 2024-02-15

## 2024-02-15 RX ORDER — FINASTERIDE 5 MG/1
1 TABLET, FILM COATED ORAL
Refills: 0 | DISCHARGE

## 2024-02-15 RX ORDER — APIXABAN 2.5 MG/1
1 TABLET, FILM COATED ORAL
Refills: 0 | DISCHARGE

## 2024-02-15 RX ORDER — SODIUM CHLORIDE 9 MG/ML
3 INJECTION INTRAMUSCULAR; INTRAVENOUS; SUBCUTANEOUS EVERY 8 HOURS
Refills: 0 | Status: DISCONTINUED | OUTPATIENT
Start: 2024-02-15 | End: 2024-02-15

## 2024-02-15 RX ORDER — MECLIZINE HCL 12.5 MG
1 TABLET ORAL
Refills: 0 | DISCHARGE

## 2024-02-15 RX ORDER — LIDOCAINE HCL 20 MG/ML
0.2 VIAL (ML) INJECTION ONCE
Refills: 0 | Status: DISCONTINUED | OUTPATIENT
Start: 2024-02-15 | End: 2024-02-15

## 2024-02-15 RX ORDER — TAMSULOSIN HYDROCHLORIDE 0.4 MG/1
1 CAPSULE ORAL
Qty: 0 | Refills: 0 | DISCHARGE

## 2024-02-15 RX ORDER — SODIUM CHLORIDE 9 MG/ML
1000 INJECTION, SOLUTION INTRAVENOUS
Refills: 0 | Status: DISCONTINUED | OUTPATIENT
Start: 2024-02-15 | End: 2024-02-29

## 2024-02-15 RX ORDER — ASPIRIN/CALCIUM CARB/MAGNESIUM 324 MG
0 TABLET ORAL
Qty: 0 | Refills: 0 | DISCHARGE

## 2024-02-15 RX ORDER — ERGOCALCIFEROL 1.25 MG/1
1 CAPSULE ORAL
Qty: 0 | Refills: 0 | DISCHARGE

## 2024-02-15 RX ORDER — LEVOTHYROXINE SODIUM 125 MCG
1 TABLET ORAL
Refills: 0 | DISCHARGE

## 2024-02-15 RX ORDER — ACETAMINOPHEN 500 MG
1000 TABLET ORAL EVERY 6 HOURS
Refills: 0 | Status: DISCONTINUED | OUTPATIENT
Start: 2024-02-15 | End: 2024-02-29

## 2024-02-15 RX ORDER — MULTIVIT-MIN/FERROUS GLUCONATE 9 MG/15 ML
1 LIQUID (ML) ORAL
Refills: 0 | DISCHARGE

## 2024-02-15 RX ORDER — NIFEDIPINE 30 MG
1 TABLET, EXTENDED RELEASE 24 HR ORAL
Refills: 0 | DISCHARGE

## 2024-02-15 RX ORDER — SODIUM CHLORIDE 9 MG/ML
1000 INJECTION, SOLUTION INTRAVENOUS
Refills: 0 | Status: DISCONTINUED | OUTPATIENT
Start: 2024-02-15 | End: 2024-02-15

## 2024-02-15 RX ORDER — ONDANSETRON 8 MG/1
4 TABLET, FILM COATED ORAL ONCE
Refills: 0 | Status: DISCONTINUED | OUTPATIENT
Start: 2024-02-15 | End: 2024-02-15

## 2024-02-15 RX ORDER — ATORVASTATIN CALCIUM 80 MG/1
1 TABLET, FILM COATED ORAL
Qty: 0 | Refills: 0 | DISCHARGE

## 2024-02-15 RX ORDER — FENTANYL CITRATE 50 UG/ML
25 INJECTION INTRAVENOUS
Refills: 0 | Status: DISCONTINUED | OUTPATIENT
Start: 2024-02-15 | End: 2024-02-15

## 2024-02-15 NOTE — ASU PATIENT PROFILE, ADULT - PRO MENTAL HEALTH SX RECENT
----- Message from Alex Jj MD sent at 9/18/2019  7:17 AM EDT -----  PLease call Earle Nava- her DEXA is still osteopenia but both spine and hip measurements have increased since prior study  Encourage healthy diet, exercise, Calcium 1200mg per day and at least 800 iu Vitamin D daily 
Per pt HIPAA form - lm of results and recommendations 
none

## 2024-02-15 NOTE — ASU DISCHARGE PLAN (ADULT/PEDIATRIC) - ASU DC SPECIAL INSTRUCTIONSFT
Discharge Instructions: Ureteroscopy    · Stent: You have an internal stent (a hollow tube that runs from the kidney to your bladder) after your procedure, which helps urine drain from the kidney to your bladder. Some patients experience urinary frequency, burning, or even back pain (especially with urination). These sensations will gradually get better. Increasing your fluid intake can also improve these symptoms. While the stent is in place, your urine may continue to be bloody. This stent is temporary and must be removed or exchanged by your urologist as an outpatient within 3 months unless otherwise specified.    · General: It is common to have blood in the urine after your procedure. It may be pink or even red; inform your doctor if you have a significant amount of clot in the urine or if you are unable to void at all. The urine may clear and then become bloody again especially as you are more physically active.    · Bathing: You may shower or bathe.    · Diet: You may resume your regular diet and regular medication regimen.    · Pain: You may take Tylenol (acetaminophen) 650-975mg and/or Motrin (ibuprofen) 400-600mg, available over the counter, for pain every 6 hours as needed. Do not exceed 4000 milligrams of Tylenol (acetaminophen) daily. You may alternate these medications such that you take either one every 3 hours.    · Antibiotics: You have been given a prescription for an antibiotic, please take this medication as instructed and be sure to complete entire course.    · Stool softeners: Do not allow yourself to become constipated as this may increase your bother from the stent and/or straining may cause bleeding. Take stool softeners (ex. Colace) or a laxative (ex. Senekot, ExLax), available over the counter, if needed.    · Activity: No heavy lifting or strenuous exercise until you are evaluated at your post-operative appointment. Otherwise, you may return to your usual level of activity.    · Anticoagulation: You may resume your AC today.    · Follow-up: If you did not already schedule your post-operative appointment, please call your urologist to schedule a follow-up appointment.    · Call your urologist if: You have any bleeding that does not stop, inability to void >8 hours, fever over 100.4 F, chills, persistent nausea/vomiting, or if your pain is not controlled on your discharge pain medications.

## 2024-02-15 NOTE — ASU PATIENT PROFILE, ADULT - FALL HARM RISK - HARM RISK INTERVENTIONS
Assistance with ambulation/Assistance OOB with selected safe patient handling equipment/Communicate Risk of Fall with Harm to all staff/Discuss with provider need for PT consult/Monitor gait and stability/Provide patient with walking aids - walker, cane, crutches/Reinforce activity limits and safety measures with patient and family/Sit up slowly, dangle for a short time, stand at bedside before walking/Tailored Fall Risk Interventions/Visual Cue: Yellow wristband and red socks/Bed in lowest position, wheels locked, appropriate side rails in place/Call bell, personal items and telephone in reach/Instruct patient to call for assistance before getting out of bed or chair/Non-slip footwear when patient is out of bed/[place] to call system/Physically safe environment - no spills, clutter or unnecessary equipment/Purposeful Proactive Rounding/Room/bathroom lighting operational, light cord in reach

## 2024-02-15 NOTE — ASU PREOP CHECKLIST - AS TEMP SITE
8:57 AM This RN contacted Dr. Lorenzo via perfect serve requesting documentation of blood consent.    oral

## 2024-02-15 NOTE — ASU DISCHARGE PLAN (ADULT/PEDIATRIC) - CARE PROVIDER_API CALL
Le Desir  Urology  [address] [place] [id number]  Phone: [phone]  Fax: [phone]  Follow Up Time: 1 week

## 2024-03-13 ENCOUNTER — APPOINTMENT (OUTPATIENT)
Dept: UROLOGY | Facility: CLINIC | Age: 74
End: 2024-03-13

## 2024-03-13 DIAGNOSIS — N13.30 UNSPECIFIED HYDRONEPHROSIS: ICD-10-CM

## 2024-03-13 DIAGNOSIS — N13.8 BENIGN PROSTATIC HYPERPLASIA WITH LOWER URINARY TRACT SYMPMS: ICD-10-CM

## 2024-03-13 DIAGNOSIS — Z96.0 PRESENCE OF UROGENITAL IMPLANTS: ICD-10-CM

## 2024-03-13 DIAGNOSIS — R33.8 OTHER RETENTION OF URINE: ICD-10-CM

## 2024-03-13 DIAGNOSIS — N18.9 CHRONIC KIDNEY DISEASE, UNSPECIFIED: ICD-10-CM

## 2024-03-13 DIAGNOSIS — N40.1 BENIGN PROSTATIC HYPERPLASIA WITH LOWER URINARY TRACT SYMPMS: ICD-10-CM

## 2024-03-13 NOTE — HISTORY OF PRESENT ILLNESS
[FreeTextEntry1] : Left vm x 2 Telehealth appointment scheduled for today for post op visit  Patient had cystoscopy bilateral ureteral stents change on February 15, 2024  He will continue finasteride and Flomax   Next stent change in 3 months

## 2024-04-26 NOTE — BRIEF OPERATIVE NOTE - NSICDXBRIEFPOSTOP_GEN_ALL_CORE_FT
POST-OP DIAGNOSIS:  Bilateral hydronephrosis 19-Jan-2023 12:26:22  Franklin Harris  
[facility]  UROLOGY [address] [place] [address] R  Scheduled Appointment: 05/13/2024    Doni Walker  [facility]  UROLOGY [address] [place] [address] R  Scheduled Appointment: 05/13/2024

## 2024-05-01 NOTE — H&P PST ADULT - HEMATOLOGY/LYMPHATICS
Quality 431: Preventive Care And Screening: Unhealthy Alcohol Use - Screening: Patient not identified as an unhealthy alcohol user when screened for unhealthy alcohol use using a systematic screening method
Quality 226: Preventive Care And Screening: Tobacco Use: Screening And Cessation Intervention: Patient screened for tobacco use and is an ex/non-smoker
Detail Level: Detailed
Quality 130: Documentation Of Current Medications In The Medical Record: Current Medications Documented
negative

## 2024-06-16 NOTE — H&P PST ADULT - BSA (M2)
healthy 6 year old w/ sore throat and low grade tmep for 2 days. on exam with tonsilar swelling and erythematous throat, uvula midline. +cervical LAD. concern for strep but rapid neg. sent culture. likely viral tonisilitis/pharyngitis. f/u culture. return precautions discussed.
2.08

## 2024-07-11 ENCOUNTER — OUTPATIENT (OUTPATIENT)
Dept: OUTPATIENT SERVICES | Facility: HOSPITAL | Age: 74
LOS: 1 days | End: 2024-07-11
Payer: COMMERCIAL

## 2024-07-11 VITALS
HEART RATE: 58 BPM | OXYGEN SATURATION: 99 % | TEMPERATURE: 98 F | SYSTOLIC BLOOD PRESSURE: 168 MMHG | HEIGHT: 70 IN | WEIGHT: 207.01 LBS | DIASTOLIC BLOOD PRESSURE: 78 MMHG | RESPIRATION RATE: 18 BRPM

## 2024-07-11 DIAGNOSIS — Z01.818 ENCOUNTER FOR OTHER PREPROCEDURAL EXAMINATION: ICD-10-CM

## 2024-07-11 DIAGNOSIS — Z96.0 PRESENCE OF UROGENITAL IMPLANTS: Chronic | ICD-10-CM

## 2024-07-11 DIAGNOSIS — I10 ESSENTIAL (PRIMARY) HYPERTENSION: ICD-10-CM

## 2024-07-11 DIAGNOSIS — N13.30 UNSPECIFIED HYDRONEPHROSIS: ICD-10-CM

## 2024-07-11 LAB
ANION GAP SERPL CALC-SCNC: 14 MMOL/L — SIGNIFICANT CHANGE UP (ref 5–17)
BUN SERPL-MCNC: 51 MG/DL — HIGH (ref 7–23)
CALCIUM SERPL-MCNC: 8.2 MG/DL — LOW (ref 8.4–10.5)
CHLORIDE SERPL-SCNC: 107 MMOL/L — SIGNIFICANT CHANGE UP (ref 96–108)
CO2 SERPL-SCNC: 21 MMOL/L — LOW (ref 22–31)
CREAT SERPL-MCNC: 4.19 MG/DL — HIGH (ref 0.5–1.3)
EGFR: 14 ML/MIN/1.73M2 — LOW
GLUCOSE SERPL-MCNC: 101 MG/DL — HIGH (ref 70–99)
HCT VFR BLD CALC: 36.2 % — LOW (ref 39–50)
HCV AB S/CO SERPL IA: 0.06 S/CO — SIGNIFICANT CHANGE UP
HCV AB SERPL-IMP: SIGNIFICANT CHANGE UP
HGB BLD-MCNC: 11.9 G/DL — LOW (ref 13–17)
MCHC RBC-ENTMCNC: 30.5 PG — SIGNIFICANT CHANGE UP (ref 27–34)
MCHC RBC-ENTMCNC: 32.9 GM/DL — SIGNIFICANT CHANGE UP (ref 32–36)
MCV RBC AUTO: 92.8 FL — SIGNIFICANT CHANGE UP (ref 80–100)
NRBC # BLD: 0 /100 WBCS — SIGNIFICANT CHANGE UP (ref 0–0)
PLATELET # BLD AUTO: 181 K/UL — SIGNIFICANT CHANGE UP (ref 150–400)
POTASSIUM SERPL-MCNC: 4.5 MMOL/L — SIGNIFICANT CHANGE UP (ref 3.5–5.3)
POTASSIUM SERPL-SCNC: 4.5 MMOL/L — SIGNIFICANT CHANGE UP (ref 3.5–5.3)
RBC # BLD: 3.9 M/UL — LOW (ref 4.2–5.8)
RBC # FLD: 14.1 % — SIGNIFICANT CHANGE UP (ref 10.3–14.5)
SODIUM SERPL-SCNC: 142 MMOL/L — SIGNIFICANT CHANGE UP (ref 135–145)
WBC # BLD: 5.11 K/UL — SIGNIFICANT CHANGE UP (ref 3.8–10.5)
WBC # FLD AUTO: 5.11 K/UL — SIGNIFICANT CHANGE UP (ref 3.8–10.5)

## 2024-07-11 NOTE — H&P PST ADULT - ASSESSMENT
DASI Score: 5.4  DASI Activity: Self care, ambulates with rollator  Loose or removable teeth: denies

## 2024-07-11 NOTE — H&P PST ADULT - PROBLEM SELECTOR PLAN 1
Preop instructions given  Labs CBC BMP A1c UC performed in [facility]  Medical eval on 7/22  Pt instructed to take last dose of Eliquis on 7/21/24 (PM dose) ? occlusion and stenosis left vertebral artery, pt may continue with Aspirin regimen

## 2024-07-11 NOTE — H&P PST ADULT - HISTORY OF PRESENT ILLNESS
This is a 73 year old male with HTN, HLD< DMT2 (not on medications, CKD, lymphoma (treated with chemo 2004), with multiple cystocope w/ bilateral ureteral stent placements, approximately 8-9 stent exchnanes. Last procedure perfomred on 2/15/24. Today presenting to [facility] for shceduled Cystoscopy, Bilateral urteral stents chanve on 7/25/24 with Dr. Desir.  Patient denies any hematuria, denies any urinary symptoms. Denies any pain or discomfort. Patient denies any other complaints at this time.       **Of note, review of documentation in April 2022- urology, imaging showed left sided hydronephrosis and was sent to the ED to have nephrostomy tube placement with  IR. The patient had the placement of the L nephrostomy tube under light sedation with fentanyl and propofol and the case was uneventful. At the conclusion, the patients was noted to desaturated to as low as 50s/60s and was quickly moved into supine position and intubated. His O2 sats immediately improve. He was monitored in the IR suite and successfully extubated. Per anesthesia, it was believed that the patient had a delayed response to the anesthetics."  Subsequent procedures have been tolerated and uneventful.    This is a 73 year old male with HTN, HLD< DMT2 (not on medications, CKD, lymphoma (treated with chemo 2004), with multiple cystocope w/ bilateral ureteral stent placements, approximately 8-9 stent exchanges Last procedure performed on 2/15/24. Today presenting to [facility] for scheduled Cystoscopy, Bilateral ureteral stents change on 7/25/24 with Dr. Desir.  Patient denies any hematuria, denies any urinary symptoms. Denies any pain or discomfort. Patient denies any other complaints at this time.       **Of note, review of documentation in April 2022- urology, imaging showed left sided hydronephrosis and was sent to the ED to have nephrostomy tube placement with  IR. The patient had the placement of the L nephrostomy tube under light sedation with fentanyl and propofol and the case was uneventful. At the conclusion, the patients was noted to desaturated to as low as 50s/60s and was quickly moved into supine position and intubated. His O2 sats immediately improve. He was monitored in the IR suite and successfully extubated. Per anesthesia, it was believed that the patient had a delayed response to the anesthetics."  Subsequent procedures have been tolerated and uneventful.

## 2024-07-11 NOTE — H&P PST ADULT - NEGATIVE RESPIRATORY AND THORAX SYMPTOMS
Karlene Chou  [address] [facility] [address]            5/23/2018      Dear Karlene,    Recently you were referred to our Gastroenterology Department for your colonoscopy. I would like to share some important information about colorectal cancer. Colorectal cancer is a leading cause of cancer death in this country. Colorectal cancer can be stopped in its tracks or even prevented with these tests.    Our office attempted to contact you by phone. We were unable to reach you. Please call [phone] to schedule an appointment with Dr. Henry Gonzalez, Dr. Cooper Chahal, Dr. Spencer Miller or Dr. Robin Glasgow at either [facility] in [place] or [facility] in [place].    If you decide to have your colonoscopy elsewhere, please call us at [phone] with the information so that we can update your record.    Your good health is important to us, colorectal cancer prevention is important for you.    Sincerely,        [facility] Gastroenterology Team   no wheezing/no dyspnea/no cough

## 2024-07-12 LAB
A1C WITH ESTIMATED AVERAGE GLUCOSE RESULT: 6.2 % — HIGH (ref 4–5.6)
ESTIMATED AVERAGE GLUCOSE: 131 MG/DL — HIGH (ref 68–114)

## 2024-07-16 LAB
-  AMOXICILLIN/CLAVULANIC ACID: SIGNIFICANT CHANGE UP
-  AMPICILLIN/SULBACTAM: SIGNIFICANT CHANGE UP
-  AMPICILLIN: SIGNIFICANT CHANGE UP
-  AZTREONAM: SIGNIFICANT CHANGE UP
-  CEFAZOLIN: SIGNIFICANT CHANGE UP
-  CEFEPIME: SIGNIFICANT CHANGE UP
-  CEFOXITIN: SIGNIFICANT CHANGE UP
-  CEFTRIAXONE: SIGNIFICANT CHANGE UP
-  CIPROFLOXACIN: SIGNIFICANT CHANGE UP
-  ERTAPENEM: SIGNIFICANT CHANGE UP
-  GENTAMICIN: SIGNIFICANT CHANGE UP
-  IMIPENEM: SIGNIFICANT CHANGE UP
-  LEVOFLOXACIN: SIGNIFICANT CHANGE UP
-  MEROPENEM: SIGNIFICANT CHANGE UP
-  NITROFURANTOIN: SIGNIFICANT CHANGE UP
-  PIPERACILLIN/TAZOBACTAM: SIGNIFICANT CHANGE UP
-  TOBRAMYCIN: SIGNIFICANT CHANGE UP
-  TRIMETHOPRIM/SULFAMETHOXAZOLE: SIGNIFICANT CHANGE UP
BLANDM BLD POS QL PROBE: SIGNIFICANT CHANGE UP
CULTURE RESULTS: ABNORMAL
METHOD TYPE: SIGNIFICANT CHANGE UP
METHOD TYPE: SIGNIFICANT CHANGE UP
ORGANISM # SPEC MICROSCOPIC CNT: ABNORMAL
SPECIMEN SOURCE: SIGNIFICANT CHANGE UP

## 2024-07-17 RX ORDER — SULFAMETHOXAZOLE AND TRIMETHOPRIM 800; 160 MG/1; MG/1
800-160 TABLET ORAL
Qty: 10 | Refills: 0 | Status: DISCONTINUED | COMMUNITY
Start: 2024-07-16 | End: 2024-07-17

## 2024-07-17 RX ORDER — NITROFURANTOIN (MONOHYDRATE/MACROCRYSTALS) 25; 75 MG/1; MG/1
100 CAPSULE ORAL TWICE DAILY
Qty: 14 | Refills: 0 | Status: ACTIVE | COMMUNITY
Start: 2024-07-17 | End: 1900-01-01

## 2024-07-24 ENCOUNTER — TRANSCRIPTION ENCOUNTER (OUTPATIENT)
Age: 74
End: 2024-07-24

## 2024-07-25 ENCOUNTER — APPOINTMENT (OUTPATIENT)
Dept: UROLOGY | Facility: HOSPITAL | Age: 74
End: 2024-07-25

## 2024-07-25 ENCOUNTER — TRANSCRIPTION ENCOUNTER (OUTPATIENT)
Age: 74
End: 2024-07-25

## 2024-07-25 PROCEDURE — 85027 COMPLETE CBC AUTOMATED: CPT

## 2024-07-25 PROCEDURE — 80048 BASIC METABOLIC PNL TOTAL CA: CPT

## 2024-07-25 PROCEDURE — G0463: CPT

## 2024-07-25 PROCEDURE — 36415 COLL VENOUS BLD VENIPUNCTURE: CPT

## 2024-07-25 PROCEDURE — 87086 URINE CULTURE/COLONY COUNT: CPT

## 2024-07-25 PROCEDURE — 87186 SC STD MICRODIL/AGAR DIL: CPT

## 2024-07-25 PROCEDURE — 86803 HEPATITIS C AB TEST: CPT

## 2024-07-25 PROCEDURE — 76000 FLUOROSCOPY <1 HR PHYS/QHP: CPT

## 2024-07-25 PROCEDURE — 87077 CULTURE AEROBIC IDENTIFY: CPT

## 2024-07-25 PROCEDURE — 83036 HEMOGLOBIN GLYCOSYLATED A1C: CPT

## 2024-12-17 NOTE — H&P PST ADULT - SUBSTANCE USE COMMENT, PROFILE
H&P reviewed. After examining the patient I find no changes in the patients condition since the H&P had been written.    Vitals:    12/17/24 1316   BP: 139/80   Pulse: 75   Resp: 14   Temp: 97.8 °F (36.6 °C)   SpO2: 99%     
Denies any illegal drug use

## 2025-01-19 NOTE — PROGRESS NOTE ADULT - ASSESSMENT
[place] EMERGENCY DEPARTMENT  EMERGENCY DEPARTMENT ENCOUNTER      Pt Name: Tim El  MRN: [id number]  Birthdate 2014  Date of evaluation: 1/19/2025  Provider: Renetta Min PA-C    CHIEF COMPLAINT       Chief Complaint   Patient presents with    Hand Pain     left         HISTORY OF PRESENT ILLNESS   (Location/Symptom, Timing/Onset, Context/Setting, Quality, Duration, Modifying Factors, Severity)  Note limiting factors.   HPI  10-year-old male presents today with complaints of left hand pain since just prior to arrival.  States he was playing basketball and someone accidentally hit him in the hand.  No numbness or paresthesias.    Review of External Medical Records:     Nursing Notes were reviewed.    REVIEW OF SYSTEMS    (2-9 systems for level 4, 10 or more for level 5)     Review of Systems   Musculoskeletal:         + Hand pain       Except as noted above the remainder of the review of systems was reviewed and negative.       PAST MEDICAL HISTORY   History reviewed. No pertinent past medical history.      SURGICAL HISTORY     History reviewed. No pertinent surgical history.      CURRENT MEDICATIONS     There are no discharge medications for this patient.      ALLERGIES     Patient has no known allergies.    FAMILY HISTORY     History reviewed. No pertinent family history.       SOCIAL HISTORY       Social History     Socioeconomic History    Marital status: Single     Spouse name: None    Number of children: None    Years of education: None    Highest education level: None   Tobacco Use    Passive exposure: Current           PHYSICAL EXAM    (up to 7 for level 4, 8 or more for level 5)     ED Triage Vitals [01/19/25 1518]   BP Systolic BP Percentile Diastolic BP Percentile Temp Temp src Pulse Resp SpO2   107/72 65 % 81 % 98.3 °F (36.8 °C) Oral 92 18 100 %      Height Weight         1.549 m (5' 1\") 38 kg (83 lb 12.4 oz)             Body mass index is 15.83 kg/m².    Physical  72 yo M hx of HTN, HLD, CKD, BPH, R eye blindness, lymphoma, vertigo, hypothyroidism, b/l ureteral stents (placed 15 years ago), presenting to ED from urologist office with worsen L hydro and renal function s/p urgent nephrostomy tube placement by IR.  nephrology consulted for worsen renal failure.    Renal failure  RUTH likely sec to obstruction, now s/p urgent nephrostomy tube placement  renal function improving  unclear baseline, in 07/21 Scr 2.5  continue NS @ 100cc/hr. monitor urine output and fluid status  Monitor I/O  f/u /IR  avoid nephrotoxic agents  monitor bmp.  pt will need further nephrology follow up. Patient advised to follow up with Dr. dewitt in 1-2 weeks after discharge    HTN  orthostatic today  continue NS   bp meds on hold  monitor  f/u cardio    proteinuria  100 protein in urine  check spot urine p/c ratio  monitor    hypocalcemia  check vit d 25, PTH  monitor    anemia   monitor   stable  monitor   70 yo M hx of HTN, HLD, CKD, BPH, R eye blindness, lymphoma, vertigo, hypothyroidism, b/l ureteral stents (placed 15 years ago), presenting to ED from urologist office with worsen L hydro and renal function s/p urgent nephrostomy tube placement by IR.  nephrology consulted for worsen renal failure.    Renal failure  RUTH likely sec to obstruction, now s/p urgent nephrostomy tube placement  renal function improving  unclear baseline, in 07/21 Scr 2.5  change IVF- NS @ 100cc/hr. monitor urine output and fluid status  Monitor I/O  f/u /IR  avoid nephrotoxic agents  monitor bmp.  pt will need further nephrology follow up. Patient advised to follow up with Dr. dewitt in 1-2 weeks after discharge    HTN  orthostatic today  continue NS   bp meds on hold  monitor  f/u cardio    proteinuria  100 protein in urine  check spot urine p/c ratio  monitor    hypocalcemia  check vit d 25, PTH  monitor    anemia   monitor   stable  monitor

## 2025-01-31 NOTE — PACU DISCHARGE NOTE - MENTAL STATUS: PATIENT PARTICIPATION
Received request from MD to assist patient with resources for dental screening. Per Dr. Stanton, instructed to call daughter. Left detailed voicemail for daughter with my contact info. Will continue to follow.    Ina COX RN  Malignant Hematology Nurse Navigator  [facility]  [facility]  [facility]  Ph: [phone] (internal: )  F: [phone]  [email or web address]   
Awake

## 2025-03-19 ENCOUNTER — OUTPATIENT (OUTPATIENT)
Dept: OUTPATIENT SERVICES | Facility: HOSPITAL | Age: 75
LOS: 1 days | End: 2025-03-19
Payer: COMMERCIAL

## 2025-03-19 VITALS
RESPIRATION RATE: 20 BRPM | TEMPERATURE: 97 F | WEIGHT: 212.08 LBS | OXYGEN SATURATION: 98 % | DIASTOLIC BLOOD PRESSURE: 74 MMHG | HEIGHT: 71 IN | SYSTOLIC BLOOD PRESSURE: 117 MMHG | HEART RATE: 67 BPM

## 2025-03-19 DIAGNOSIS — Z96.0 PRESENCE OF UROGENITAL IMPLANTS: Chronic | ICD-10-CM

## 2025-03-19 DIAGNOSIS — Q62.11 CONGENITAL OCCLUSION OF URETEROPELVIC JUNCTION: ICD-10-CM

## 2025-03-19 DIAGNOSIS — N13.0 HYDRONEPHROSIS WITH URETEROPELVIC JUNCTION OBSTRUCTION: ICD-10-CM

## 2025-03-19 DIAGNOSIS — Z01.818 ENCOUNTER FOR OTHER PREPROCEDURAL EXAMINATION: ICD-10-CM

## 2025-03-19 LAB
ANION GAP SERPL CALC-SCNC: 16 MMOL/L — SIGNIFICANT CHANGE UP (ref 5–17)
BUN SERPL-MCNC: 55 MG/DL — HIGH (ref 7–23)
CALCIUM SERPL-MCNC: 8.3 MG/DL — LOW (ref 8.4–10.5)
CHLORIDE SERPL-SCNC: 108 MMOL/L — SIGNIFICANT CHANGE UP (ref 96–108)
CO2 SERPL-SCNC: 19 MMOL/L — LOW (ref 22–31)
CREAT SERPL-MCNC: 4.09 MG/DL — HIGH (ref 0.5–1.3)
EGFR: 15 ML/MIN/1.73M2 — LOW
EGFR: 15 ML/MIN/1.73M2 — LOW
GLUCOSE SERPL-MCNC: 115 MG/DL — HIGH (ref 70–99)
HCT VFR BLD CALC: 38.1 % — LOW (ref 39–50)
HGB BLD-MCNC: 12.1 G/DL — LOW (ref 13–17)
MCHC RBC-ENTMCNC: 30 PG — SIGNIFICANT CHANGE UP (ref 27–34)
MCHC RBC-ENTMCNC: 31.8 G/DL — LOW (ref 32–36)
MCV RBC AUTO: 94.3 FL — SIGNIFICANT CHANGE UP (ref 80–100)
PLATELET # BLD AUTO: 190 K/UL — SIGNIFICANT CHANGE UP (ref 150–400)
POTASSIUM SERPL-MCNC: 4.2 MMOL/L — SIGNIFICANT CHANGE UP (ref 3.5–5.3)
POTASSIUM SERPL-SCNC: 4.2 MMOL/L — SIGNIFICANT CHANGE UP (ref 3.5–5.3)
RBC # BLD: 4.04 M/UL — LOW (ref 4.2–5.8)
RBC # FLD: 14 % — SIGNIFICANT CHANGE UP (ref 10.3–14.5)
SODIUM SERPL-SCNC: 143 MMOL/L — SIGNIFICANT CHANGE UP (ref 135–145)
WBC # BLD: 6.47 K/UL — SIGNIFICANT CHANGE UP (ref 3.8–10.5)
WBC # FLD AUTO: 6.47 K/UL — SIGNIFICANT CHANGE UP (ref 3.8–10.5)

## 2025-03-19 NOTE — H&P PST ADULT - ANESTHESIA, PREVIOUS REACTION, PROFILE
none review of documentation in April 2022-  The patient had the placement of the L nephrostomy tube under light sedation with fentanyl and propofol and the case was uneventful. At the conclusion, the patients was noted to desaturated to as low as 50s/60s and was quickly moved into supine position and intubated. His O2 sats immediately improve. He was monitored in the IR suite and successfully extubated. Per anesthesia, it was believed that the patient had a delayed response to the anesthetics."  Subsequent procedures have been tolerated and uneventful.

## 2025-03-19 NOTE — H&P PST ADULT - PROBLEM SELECTOR PLAN 1
Cystoscopy  Bilateral  Ureteral stent exchange on 4/3/25.  Pre- Op instructions discussed   labs and urine culture sent  pt will stop eliquis 3 days prior to surgery    pt will continue aspirin   medical eval Cystoscopy  Bilateral  Ureteral stent exchange on 4/3/25.  Pre- Op instructions discussed   labs and urine culture sent  pt will stop eliquis 3 days prior to surgery ? on for    pt will continue aspirin   medical eval

## 2025-03-19 NOTE — H&P PST ADULT - HISTORY OF PRESENT ILLNESS
74 year old male with HTN, HLD,  DMT2 (not on medications, CKD, lymphoma (treated with chemo 2004), with multiple cystocope w/ bilateral ureteral stent placements, approximately 8-9 stent exchanges Last procedure performed on 7/25/24. Today presenting to [facility] for scheduled Cystoscopy, Bilateral ureteral stents change on 4/3/25 with Dr. Desir.  Patient denies any hematuria, denies any urinary symptoms. Denies any pain or discomfort. Patient denies any other complaints at this time.       **Of note, review of documentation in April 2022- urology, imaging showed left sided hydronephrosis and was sent to the ED to have nephrostomy tube placement with  IR. The patient had the placement of the L nephrostomy tube under light sedation with fentanyl and propofol and the case was uneventful. At the conclusion, the patients was noted to desaturated to as low as 50s/60s and was quickly moved into supine position and intubated. His O2 sats immediately improve. He was monitored in the IR suite and successfully extubated. Per anesthesia, it was believed that the patient had a delayed response to the anesthetics."  Subsequent procedures have been tolerated and uneventful.     74 year old male with HTN, HLD,  DMT2 (not on medications, CKD, ? 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 Today presenting to [facility] for scheduled Cystoscopy, Bilateral ureteral stents change on 4/3/25 with Dr. Desir.  Patient denies any hematuria, denies any urinary symptoms. Denies any pain or discomfort. Patient denies any other complaints at this time.        74 year old male with HTN, HLD,  DMT2 (not on medications, CKD, ? 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 Today presenting to [facility] for scheduled Cystoscopy, Bilateral ureteral stents change on 4/3/25.  Patient denies any hematuria, denies any urinary symptoms. Denies any pain or discomfort. Patient denies any other complaints at this time.

## 2025-03-19 NOTE — H&P PST ADULT - ASSESSMENT
DASI Score: 5.4  DASI Activity: Self care, ambulates with rollator  Loose or removable teeth: denies   DASI Score: 6.25   DASI Activity: Self care, ambulates with rollator  Loose or removable teeth:  upper and lower dentures

## 2025-03-19 NOTE — H&P PST ADULT - NSICDXPASTSURGICALHX_GEN_ALL_CORE_FT
PAST SURGICAL HISTORY:  S/P cystoscopy with ureteral stent placement     S/P cystoscopy with ureteral stent placement

## 2025-03-20 LAB
CULTURE RESULTS: NO GROWTH — SIGNIFICANT CHANGE UP
SPECIMEN SOURCE: SIGNIFICANT CHANGE UP

## 2025-04-03 ENCOUNTER — TRANSCRIPTION ENCOUNTER (OUTPATIENT)
Age: 75
End: 2025-04-03

## 2025-04-03 ENCOUNTER — APPOINTMENT (OUTPATIENT)
Dept: UROLOGY | Facility: HOSPITAL | Age: 75
End: 2025-04-03

## 2025-04-03 ENCOUNTER — OUTPATIENT (OUTPATIENT)
Dept: INPATIENT UNIT | Facility: HOSPITAL | Age: 75
LOS: 1 days | End: 2025-04-03
Payer: COMMERCIAL

## 2025-04-03 VITALS
HEIGHT: 71 IN | TEMPERATURE: 98 F | DIASTOLIC BLOOD PRESSURE: 92 MMHG | WEIGHT: 212.08 LBS | SYSTOLIC BLOOD PRESSURE: 171 MMHG | OXYGEN SATURATION: 100 % | RESPIRATION RATE: 18 BRPM | HEART RATE: 70 BPM

## 2025-04-03 VITALS
SYSTOLIC BLOOD PRESSURE: 150 MMHG | TEMPERATURE: 98 F | DIASTOLIC BLOOD PRESSURE: 70 MMHG | HEART RATE: 67 BPM | RESPIRATION RATE: 12 BRPM | OXYGEN SATURATION: 96 %

## 2025-04-03 DIAGNOSIS — Z96.0 PRESENCE OF UROGENITAL IMPLANTS: Chronic | ICD-10-CM

## 2025-04-03 DIAGNOSIS — N13.0 HYDRONEPHROSIS WITH URETEROPELVIC JUNCTION OBSTRUCTION: ICD-10-CM

## 2025-04-03 LAB — GLUCOSE BLDC GLUCOMTR-MCNC: 101 MG/DL — HIGH (ref 70–99)

## 2025-04-03 PROCEDURE — 52332 CYSTOSCOPY AND TREATMENT: CPT | Mod: 50

## 2025-04-03 PROCEDURE — C2625: CPT

## 2025-04-03 PROCEDURE — 74420 UROGRAPHY RTRGR +-KUB: CPT | Mod: 26

## 2025-04-03 PROCEDURE — 76000 FLUOROSCOPY <1 HR PHYS/QHP: CPT

## 2025-04-03 PROCEDURE — 82962 GLUCOSE BLOOD TEST: CPT

## 2025-04-03 PROCEDURE — 80048 BASIC METABOLIC PNL TOTAL CA: CPT

## 2025-04-03 PROCEDURE — C1758: CPT

## 2025-04-03 PROCEDURE — C1769: CPT

## 2025-04-03 PROCEDURE — 85027 COMPLETE CBC AUTOMATED: CPT

## 2025-04-03 PROCEDURE — G0463: CPT

## 2025-04-03 PROCEDURE — 87086 URINE CULTURE/COLONY COUNT: CPT

## 2025-04-03 DEVICE — GUIDEWIRE SENSOR DUAL-FLEX NITINOL STRAIGHT .035" X 150CM: Type: IMPLANTABLE DEVICE | Site: BILATERAL | Status: FUNCTIONAL

## 2025-04-03 DEVICE — IMPLANTABLE DEVICE: Type: IMPLANTABLE DEVICE | Site: BILATERAL | Status: FUNCTIONAL

## 2025-04-03 DEVICE — STENT BARD INLAY OPTIMA W/ NICORE 8 FR 26CM: Type: IMPLANTABLE DEVICE | Site: BILATERAL | Status: FUNCTIONAL

## 2025-04-03 DEVICE — URETERAL CATH SOF-FLEX OPEN END 6FR .040" X 70CM: Type: IMPLANTABLE DEVICE | Site: BILATERAL | Status: FUNCTIONAL

## 2025-04-03 RX ORDER — HYDROMORPHONE/SOD CHLOR,ISO/PF 2 MG/10 ML
0.25 SYRINGE (ML) INJECTION
Refills: 0 | Status: DISCONTINUED | OUTPATIENT
Start: 2025-04-03 | End: 2025-04-03

## 2025-04-03 RX ORDER — OXYCODONE HYDROCHLORIDE 30 MG/1
5 TABLET ORAL ONCE
Refills: 0 | Status: DISCONTINUED | OUTPATIENT
Start: 2025-04-03 | End: 2025-04-03

## 2025-04-03 RX ORDER — CEFAZOLIN SODIUM IN 0.9 % NACL 3 G/100 ML
2000 INTRAVENOUS SOLUTION, PIGGYBACK (ML) INTRAVENOUS ONCE
Refills: 0 | Status: COMPLETED | OUTPATIENT
Start: 2025-04-03 | End: 2025-04-03

## 2025-04-03 RX ORDER — SODIUM CHLORIDE 9 G/1000ML
1000 INJECTION, SOLUTION INTRAVENOUS
Refills: 0 | Status: DISCONTINUED | OUTPATIENT
Start: 2025-04-03 | End: 2025-04-03

## 2025-04-03 RX ORDER — CIPROFLOXACIN HCL 250 MG
1 TABLET ORAL
Qty: 3 | Refills: 0
Start: 2025-04-03 | End: 2025-04-05

## 2025-04-03 RX ORDER — ACETAMINOPHEN 500 MG/5ML
1000 LIQUID (ML) ORAL ONCE
Refills: 0 | Status: DISCONTINUED | OUTPATIENT
Start: 2025-04-03 | End: 2025-04-03

## 2025-04-03 RX ORDER — LIDOCAINE HCL/PF 10 MG/ML
0.2 VIAL (ML) INJECTION ONCE
Refills: 0 | Status: DISCONTINUED | OUTPATIENT
Start: 2025-04-03 | End: 2025-04-03

## 2025-04-03 RX ORDER — ONDANSETRON HCL/PF 4 MG/2 ML
4 VIAL (ML) INJECTION ONCE
Refills: 0 | Status: DISCONTINUED | OUTPATIENT
Start: 2025-04-03 | End: 2025-04-03

## 2025-04-03 NOTE — ASU DISCHARGE PLAN (ADULT/PEDIATRIC) - ACCOMPANIED BY
----- Message from Malia Garsia MD sent at 2/3/2021  9:06 AM EST -----  Referred to dr Suraj Bradford  for the parathyroid adenoma    Please instruct him how to make the appointment
CALLED PT  [email or web address]
Patient was contacted and was provided the number to Dr Burch' office to set up the appointment for his adenoma  [phone]
Family

## 2025-04-03 NOTE — ASU DISCHARGE PLAN (ADULT/PEDIATRIC) - ASU DC SPECIAL INSTRUCTIONSFT
PAIN CONTROL: You may take 650 mg of Tylenol every 4-6 hours. Do not exceed more than 4000mg or 4 grams of Tylenol daily. You may take Tylenol and Ibuprofen as needed for pain. If you were prescribed narcotic pain medication, take as directed. You should also take senna to prevent constipation.    STENT: You may have intermittent pink tinged urine and slight flank pain when you urinate.  This is normal and due to the stent in your ureter. It is not uncommon to have some burning when you urinate.  Some patients do not feel any discomfort from the stent, while others may feel the sensation of needing to urinate frequently, burning on urination, or even back pain that worsens with urination. These symptoms usually improve gradually, however it may be necessary to take pain medication until the discomfort subsides.  If you are unable to urinate or your urine becomes bright red or with clots, please call the office. Please make sure you drink plenty of fluids.    ANTIBIOTICS: Please complete the course of antibiotics sent your pharmacy as instructed.    BATHING: Please do not submerge wound underwater. You may shower and/or sponge bathe.    ACTIVITY: No heavy lifting or straining. Otherwise, you may return to your usual level of physical activity. If you are taking narcotic pain medications (such as oxycodone), do NOT drive a car, operate machinery or make important decisions.    DIET: Return to your usual diet    NOTIFY YOUR SURGEON IF: You have any bleeding that does not stop, any fevers (over 100.4F) or chills, persistent nausea/vomiting, persistent diarrhea, your pain is not controlled on your discharge pain medications, heavy bleeding in the urine, inability to urinate, or other worrisome symptoms arise.    FOLLOW UP:  1. Please call your surgeon to make a follow up appointment within one month of discharge  2: Please follow up with your primary care physician within 1-2 weeks regarding your hospitalization.

## 2025-04-03 NOTE — ASU PATIENT PROFILE, ADULT - FALL HARM RISK - RISK INTERVENTIONS

## 2025-04-03 NOTE — ASU DISCHARGE PLAN (ADULT/PEDIATRIC) - FINANCIAL ASSISTANCE
[facility] provides services at a reduced cost to those who are determined to be eligible through [facility]’s financial assistance program. Information regarding [facility]’s financial assistance program can be found by going to [email or web address] or by calling [phone].

## 2025-04-03 NOTE — PRE-ANESTHESIA EVALUATION ADULT - NSANTHPMHFT_GEN_ALL_CORE
74 year old male with HTN, HLD,  DMT2 (not on medications, CKD, ? 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 Today presenting to [facility] for scheduled Cystoscopy, Bilateral ureteral stents change on 4/3/25.  Patient denies any hematuria, denies any urinary symptoms. Denies any pain or discomfort. Patient denies any other complaints at this time.    States off DM meds since 1986

## 2025-04-03 NOTE — ASU DISCHARGE PLAN (ADULT/PEDIATRIC) - CARE PROVIDER_API CALL
[facility]  [address] [place] [id number]  Phone: [phone]  Fax: [phone]  Follow Up Time: 1 month

## 2025-04-03 NOTE — ASU PATIENT PROFILE, ADULT - BILL OF RIGHTS/ADMISSION INFORMATION PROVIDED TO:
POST-OP DIAGNOSIS:  Femoroacetabular impingement of right hip 14-Jun-2023 16:23:07  Gabo Singh  
Patient

## 2025-05-07 ENCOUNTER — APPOINTMENT (OUTPATIENT)
Dept: UROLOGY | Facility: CLINIC | Age: 75
End: 2025-05-07
Payer: MEDICARE

## 2025-05-07 DIAGNOSIS — N13.30 UNSPECIFIED HYDRONEPHROSIS: ICD-10-CM

## 2025-05-07 DIAGNOSIS — N18.9 CHRONIC KIDNEY DISEASE, UNSPECIFIED: ICD-10-CM

## 2025-05-07 DIAGNOSIS — N13.8 BENIGN PROSTATIC HYPERPLASIA WITH LOWER URINARY TRACT SYMPMS: ICD-10-CM

## 2025-05-07 DIAGNOSIS — N40.1 BENIGN PROSTATIC HYPERPLASIA WITH LOWER URINARY TRACT SYMPMS: ICD-10-CM

## 2025-05-07 PROCEDURE — 99212 OFFICE O/P EST SF 10 MIN: CPT | Mod: 93

## 2025-05-09 NOTE — PATIENT PROFILE ADULT - FALL HARM RISK - HARM RISK INTERVENTIONS
